# Patient Record
Sex: FEMALE | Race: WHITE | Employment: OTHER | URBAN - METROPOLITAN AREA
[De-identification: names, ages, dates, MRNs, and addresses within clinical notes are randomized per-mention and may not be internally consistent; named-entity substitution may affect disease eponyms.]

---

## 2022-02-19 ENCOUNTER — APPOINTMENT (OUTPATIENT)
Dept: CT IMAGING | Age: 82
DRG: 270 | End: 2022-02-19
Payer: MEDICARE

## 2022-02-19 ENCOUNTER — ANESTHESIA (OUTPATIENT)
Dept: OPERATING ROOM | Age: 82
DRG: 270 | End: 2022-02-19
Payer: MEDICARE

## 2022-02-19 ENCOUNTER — ANESTHESIA EVENT (OUTPATIENT)
Dept: OPERATING ROOM | Age: 82
DRG: 270 | End: 2022-02-19
Payer: MEDICARE

## 2022-02-19 ENCOUNTER — APPOINTMENT (OUTPATIENT)
Dept: GENERAL RADIOLOGY | Age: 82
DRG: 270 | End: 2022-02-19
Payer: MEDICARE

## 2022-02-19 ENCOUNTER — HOSPITAL ENCOUNTER (INPATIENT)
Age: 82
LOS: 9 days | Discharge: NURSING, REHAB OR LONG-TERM CARE | DRG: 270 | End: 2022-02-28
Attending: EMERGENCY MEDICINE | Admitting: INTERNAL MEDICINE
Payer: MEDICARE

## 2022-02-19 VITALS
DIASTOLIC BLOOD PRESSURE: 81 MMHG | TEMPERATURE: 98.4 F | OXYGEN SATURATION: 97 % | SYSTOLIC BLOOD PRESSURE: 178 MMHG | RESPIRATION RATE: 19 BRPM

## 2022-02-19 DIAGNOSIS — I74.2: ICD-10-CM

## 2022-02-19 DIAGNOSIS — I26.02 ACUTE SADDLE PULMONARY EMBOLISM WITH ACUTE COR PULMONALE (HCC): Primary | ICD-10-CM

## 2022-02-19 DIAGNOSIS — K55.069 SUPERIOR MESENTERIC ARTERY THROMBOSIS (HCC): ICD-10-CM

## 2022-02-19 DIAGNOSIS — I99.8 ISCHEMIA OF LEFT UPPER EXTREMITY: ICD-10-CM

## 2022-02-19 PROBLEM — I26.99 MULTIPLE PULMONARY EMBOLI (HCC): Status: ACTIVE | Noted: 2022-02-19

## 2022-02-19 PROBLEM — I70.8 OCCLUSION OF LEFT SUBCLAVIAN ARTERY: Status: ACTIVE | Noted: 2022-02-19

## 2022-02-19 LAB
AADO2: 435 MMHG
ABO/RH: NORMAL
ALBUMIN SERPL-MCNC: 3.3 G/DL (ref 3.5–5.2)
ALBUMIN SERPL-MCNC: 3.5 G/DL (ref 3.5–5.2)
ALP BLD-CCNC: 67 U/L (ref 35–104)
ALP BLD-CCNC: 74 U/L (ref 35–104)
ALT SERPL-CCNC: 38 U/L (ref 0–32)
ALT SERPL-CCNC: 38 U/L (ref 0–32)
ANION GAP SERPL CALCULATED.3IONS-SCNC: 12 MMOL/L (ref 7–16)
ANION GAP SERPL CALCULATED.3IONS-SCNC: 14 MMOL/L (ref 7–16)
ANION GAP: 7 MMOL/L (ref 7–16)
ANION GAP: 9 MMOL/L (ref 7–16)
ANTIBODY SCREEN: NORMAL
APTT: 26.9 SEC (ref 24.5–35.1)
APTT: >240 SEC (ref 24.5–35.1)
APTT: >240 SEC (ref 24.5–35.1)
AST SERPL-CCNC: 25 U/L (ref 0–31)
AST SERPL-CCNC: 26 U/L (ref 0–31)
B.E.: -1 MMOL/L (ref -3–3)
B.E.: -5.5 MMOL/L (ref -3–3)
B.E.: 0.3 MMOL/L (ref -3–3)
BASOPHILS ABSOLUTE: 0.05 E9/L (ref 0–0.2)
BASOPHILS ABSOLUTE: 0.05 E9/L (ref 0–0.2)
BASOPHILS RELATIVE PERCENT: 0.3 % (ref 0–2)
BASOPHILS RELATIVE PERCENT: 0.6 % (ref 0–2)
BILIRUB SERPL-MCNC: 0.3 MG/DL (ref 0–1.2)
BILIRUB SERPL-MCNC: 0.4 MG/DL (ref 0–1.2)
BILIRUBIN URINE: NEGATIVE
BLOOD, URINE: NEGATIVE
BUN BLDV-MCNC: 13 MG/DL (ref 6–23)
BUN BLDV-MCNC: 16 MG/DL (ref 6–23)
CALCIUM IONIZED: 1.2 MMOL/L (ref 1.15–1.33)
CALCIUM SERPL-MCNC: 8.1 MG/DL (ref 8.6–10.2)
CALCIUM SERPL-MCNC: 9.2 MG/DL (ref 8.6–10.2)
CARDIOPULMONARY BYPASS: NO
CARDIOPULMONARY BYPASS: NO
CHLORIDE BLD-SCNC: 101 MMOL/L (ref 98–107)
CHLORIDE BLD-SCNC: 102 MMOL/L (ref 98–107)
CHP ED QC CHECK: NORMAL
CLARITY: CLEAR
CO2: 20 MMOL/L (ref 22–29)
CO2: 23 MMOL/L (ref 22–29)
COHB: 0.6 % (ref 0–1.5)
COLOR: YELLOW
CREAT SERPL-MCNC: 0.7 MG/DL (ref 0.5–1)
CREAT SERPL-MCNC: 0.7 MG/DL (ref 0.5–1)
CRITICAL: ABNORMAL
DATE ANALYZED: ABNORMAL
DATE OF COLLECTION: ABNORMAL
DEVICE: ABNORMAL
DEVICE: ABNORMAL
EKG ATRIAL RATE: 78 BPM
EKG P AXIS: 31 DEGREES
EKG P-R INTERVAL: 146 MS
EKG Q-T INTERVAL: 474 MS
EKG QRS DURATION: 80 MS
EKG QTC CALCULATION (BAZETT): 540 MS
EKG R AXIS: 6 DEGREES
EKG T AXIS: -65 DEGREES
EKG VENTRICULAR RATE: 78 BPM
EOSINOPHILS ABSOLUTE: 0.07 E9/L (ref 0.05–0.5)
EOSINOPHILS ABSOLUTE: 0.14 E9/L (ref 0.05–0.5)
EOSINOPHILS RELATIVE PERCENT: 0.4 % (ref 0–6)
EOSINOPHILS RELATIVE PERCENT: 1.5 % (ref 0–6)
FIO2: 80 %
GFR AFRICAN AMERICAN: >60
GFR NON-AFRICAN AMERICAN: >60 ML/MIN/1.73
GFR NON-AFRICAN AMERICAN: >60 ML/MIN/1.73
GFR, ESTIMATED: >60 ML/MIN/1.73
GFR, ESTIMATED: >60 ML/MIN/1.73
GLUCOSE BLD-MCNC: 138 MG/DL (ref 74–99)
GLUCOSE BLD-MCNC: 166 MG/DL (ref 74–99)
GLUCOSE BLD-MCNC: 171 MG/DL
GLUCOSE BLD-MCNC: 215 MG/DL (ref 74–99)
GLUCOSE BLD-MCNC: 252 MG/DL (ref 74–99)
GLUCOSE URINE: NEGATIVE MG/DL
HCO3 ARTERIAL: 22.5 MMOL/L (ref 22–26)
HCO3 ARTERIAL: 28.2 MMOL/L (ref 22–26)
HCO3: 19 MMOL/L (ref 22–26)
HCT (EST): 36 % (ref 34–48)
HCT (EST): 43 % (ref 34–48)
HCT VFR BLD CALC: 39.9 % (ref 34–48)
HCT VFR BLD CALC: 44.2 % (ref 34–48)
HEMOGLOBIN: 12.6 G/DL (ref 11.5–15.5)
HEMOGLOBIN: 13.8 G/DL (ref 11.5–15.5)
HGB, (EST): 12.2 G/DL (ref 11.5–15.5)
HGB, (EST): 14.6 G/DL (ref 11.5–15.5)
HHB: 4.9 % (ref 0–5)
IMMATURE GRANULOCYTES #: 0.05 E9/L
IMMATURE GRANULOCYTES #: 0.08 E9/L
IMMATURE GRANULOCYTES %: 0.5 % (ref 0–5)
IMMATURE GRANULOCYTES %: 0.6 % (ref 0–5)
INR BLD: 1.1
KETONES, URINE: NEGATIVE MG/DL
LAB: ABNORMAL
LACTIC ACID: 3.2 MMOL/L (ref 0.5–2.2)
LEUKOCYTE ESTERASE, URINE: NEGATIVE
LYMPHOCYTES ABSOLUTE: 1.86 E9/L (ref 1.5–4)
LYMPHOCYTES ABSOLUTE: 2.54 E9/L (ref 1.5–4)
LYMPHOCYTES RELATIVE PERCENT: 16.3 % (ref 20–42)
LYMPHOCYTES RELATIVE PERCENT: 20.6 % (ref 20–42)
Lab: ABNORMAL
MAGNESIUM: 1.9 MG/DL (ref 1.6–2.6)
MCH RBC QN AUTO: 24.2 PG (ref 26–35)
MCH RBC QN AUTO: 24.5 PG (ref 26–35)
MCHC RBC AUTO-ENTMCNC: 31.2 % (ref 32–34.5)
MCHC RBC AUTO-ENTMCNC: 31.6 % (ref 32–34.5)
MCV RBC AUTO: 77.4 FL (ref 80–99.9)
MCV RBC AUTO: 77.5 FL (ref 80–99.9)
METER GLUCOSE: 171 MG/DL (ref 74–99)
METHB: 0.3 % (ref 0–1.5)
MODE: AC
MONOCYTES ABSOLUTE: 0.48 E9/L (ref 0.1–0.95)
MONOCYTES ABSOLUTE: 0.93 E9/L (ref 0.1–0.95)
MONOCYTES RELATIVE PERCENT: 5.3 % (ref 2–12)
MONOCYTES RELATIVE PERCENT: 6 % (ref 2–12)
NEUTROPHILS ABSOLUTE: 11.96 E9/L (ref 1.8–7.3)
NEUTROPHILS ABSOLUTE: 6.47 E9/L (ref 1.8–7.3)
NEUTROPHILS RELATIVE PERCENT: 71.4 % (ref 43–80)
NEUTROPHILS RELATIVE PERCENT: 76.5 % (ref 43–80)
NITRITE, URINE: NEGATIVE
O2 SATURATION: 95.1 % (ref 92–98.5)
O2 SATURATION: 95.3 % (ref 92–98.5)
O2 SATURATION: 99.3 % (ref 92–98.5)
O2HB: 94.2 % (ref 94–97)
OPERATOR ID: 1632
OPERATOR ID: ABNORMAL
OPERATOR ID: ABNORMAL
PATIENT TEMP: 37 C
PCO2 ARTERIAL: 32.9 MMHG (ref 35–45)
PCO2 ARTERIAL: 58.2 MMHG (ref 35–45)
PCO2: 34.3 MMHG (ref 35–45)
PDW BLD-RTO: 14.7 FL (ref 11.5–15)
PDW BLD-RTO: 15.8 FL (ref 11.5–15)
PEEP/CPAP: 8 CMH2O
PFO2: 0.99 MMHG/%
PH BLOOD GAS: 7.29 (ref 7.35–7.45)
PH BLOOD GAS: 7.36 (ref 7.35–7.45)
PH BLOOD GAS: 7.44 (ref 7.35–7.45)
PH UA: 7 (ref 5–9)
PHOSPHORUS: 3.8 MG/DL (ref 2.5–4.5)
PLATELET # BLD: 225 E9/L (ref 130–450)
PLATELET # BLD: 272 E9/L (ref 130–450)
PMV BLD AUTO: 9.3 FL (ref 7–12)
PMV BLD AUTO: 9.3 FL (ref 7–12)
PO2 ARTERIAL: 139.3 MMHG (ref 60–80)
PO2 ARTERIAL: 87.9 MMHG (ref 60–80)
PO2: 79.4 MMHG (ref 75–100)
POC BUN: 10 MG/DL (ref 8–23)
POC BUN: 11 MG/DL (ref 8–23)
POC CHLORIDE: 108 MMOL/L (ref 100–108)
POC CHLORIDE: 109 MMOL/L (ref 100–108)
POC CO2: 23.1 MMOL/L (ref 22–29)
POC CO2: 29.1 MMOL/L (ref 22–29)
POC CREATININE: 0.6 MG/DL (ref 0.5–1)
POC CREATININE: 0.7 MG/DL (ref 0.5–1)
POC IONIZED CALCIUM: 1.1 (ref 1.1–1.3)
POC IONIZED CALCIUM: 1.2 (ref 1.1–1.3)
POC LACTIC ACID: 1.6 (ref 0.5–2.2)
POC LACTIC ACID: 1.7 (ref 0.5–2.2)
POC SODIUM: 141 MMOL/L (ref 132–146)
POC SODIUM: 144 MMOL/L (ref 132–146)
POTASSIUM SERPL-SCNC: 3.6 MMOL/L (ref 3.5–5.5)
POTASSIUM SERPL-SCNC: 3.8 MMOL/L (ref 3.5–5)
POTASSIUM SERPL-SCNC: 3.8 MMOL/L (ref 3.5–5.5)
POTASSIUM SERPL-SCNC: 4 MMOL/L (ref 3.5–5)
PROTEIN UA: NEGATIVE MG/DL
PROTHROMBIN TIME: 12.1 SEC (ref 9.3–12.4)
RBC # BLD: 5.15 E12/L (ref 3.5–5.5)
RBC # BLD: 5.71 E12/L (ref 3.5–5.5)
REASON FOR REJECTION: NORMAL
REJECTED TEST: NORMAL
RI(T): 5.48
RR MECHANICAL: 16 B/MIN
SARS-COV-2, NAAT: NOT DETECTED
SODIUM BLD-SCNC: 136 MMOL/L (ref 132–146)
SODIUM BLD-SCNC: 136 MMOL/L (ref 132–146)
SOURCE, BLOOD GAS: ABNORMAL
SPECIFIC GRAVITY UA: 1.01 (ref 1–1.03)
THB: 13.7 G/DL (ref 11.5–16.5)
TIME ANALYZED: 2316
TOTAL PROTEIN: 6 G/DL (ref 6.4–8.3)
TOTAL PROTEIN: 6.6 G/DL (ref 6.4–8.3)
TROPONIN, HIGH SENSITIVITY: 55 NG/L (ref 0–9)
UROBILINOGEN, URINE: 0.2 E.U./DL
VT MECHANICAL: 350 ML
WBC # BLD: 15.6 E9/L (ref 4.5–11.5)
WBC # BLD: 9.1 E9/L (ref 4.5–11.5)

## 2022-02-19 PROCEDURE — 94002 VENT MGMT INPAT INIT DAY: CPT

## 2022-02-19 PROCEDURE — 06H03DZ INSERTION OF INTRALUMINAL DEVICE INTO INFERIOR VENA CAVA, PERCUTANEOUS APPROACH: ICD-10-PCS | Performed by: SURGERY

## 2022-02-19 PROCEDURE — 81003 URINALYSIS AUTO W/O SCOPE: CPT

## 2022-02-19 PROCEDURE — 6360000002 HC RX W HCPCS: Performed by: NURSE ANESTHETIST, CERTIFIED REGISTERED

## 2022-02-19 PROCEDURE — 04C53ZZ EXTIRPATION OF MATTER FROM SUPERIOR MESENTERIC ARTERY, PERCUTANEOUS APPROACH: ICD-10-PCS | Performed by: SURGERY

## 2022-02-19 PROCEDURE — 70450 CT HEAD/BRAIN W/O DYE: CPT

## 2022-02-19 PROCEDURE — 70496 CT ANGIOGRAPHY HEAD: CPT | Performed by: RADIOLOGY

## 2022-02-19 PROCEDURE — 71045 X-RAY EXAM CHEST 1 VIEW: CPT

## 2022-02-19 PROCEDURE — 70496 CT ANGIOGRAPHY HEAD: CPT

## 2022-02-19 PROCEDURE — 96365 THER/PROPH/DIAG IV INF INIT: CPT

## 2022-02-19 PROCEDURE — 34151 REMOVAL OF ARTERY CLOT: CPT | Performed by: SURGERY

## 2022-02-19 PROCEDURE — 3600000017 HC SURGERY HYBRID ADDL 15MIN: Performed by: SURGERY

## 2022-02-19 PROCEDURE — 3600000007 HC SURGERY HYBRID BASE: Performed by: SURGERY

## 2022-02-19 PROCEDURE — 85347 COAGULATION TIME ACTIVATED: CPT

## 2022-02-19 PROCEDURE — C1757 CATH, THROMBECTOMY/EMBOLECT: HCPCS | Performed by: SURGERY

## 2022-02-19 PROCEDURE — 82330 ASSAY OF CALCIUM: CPT

## 2022-02-19 PROCEDURE — 0042T CT BRAIN PERFUSION: CPT | Performed by: RADIOLOGY

## 2022-02-19 PROCEDURE — 85730 THROMBOPLASTIN TIME PARTIAL: CPT

## 2022-02-19 PROCEDURE — 82962 GLUCOSE BLOOD TEST: CPT

## 2022-02-19 PROCEDURE — 2700000000 HC OXYGEN THERAPY PER DAY

## 2022-02-19 PROCEDURE — 6360000004 HC RX CONTRAST MEDICATION: Performed by: RADIOLOGY

## 2022-02-19 PROCEDURE — 84484 ASSAY OF TROPONIN QUANT: CPT

## 2022-02-19 PROCEDURE — 99285 EMERGENCY DEPT VISIT HI MDM: CPT

## 2022-02-19 PROCEDURE — A4217 STERILE WATER/SALINE, 500 ML: HCPCS | Performed by: SURGERY

## 2022-02-19 PROCEDURE — 96375 TX/PRO/DX INJ NEW DRUG ADDON: CPT

## 2022-02-19 PROCEDURE — 6360000002 HC RX W HCPCS: Performed by: SURGERY

## 2022-02-19 PROCEDURE — 2709999900 HC NON-CHARGEABLE SUPPLY: Performed by: SURGERY

## 2022-02-19 PROCEDURE — C1880 VENA CAVA FILTER: HCPCS | Performed by: SURGERY

## 2022-02-19 PROCEDURE — 74018 RADEX ABDOMEN 1 VIEW: CPT

## 2022-02-19 PROCEDURE — 99448 NTRPROF PH1/NTRNET/EHR 21-30: CPT | Performed by: PSYCHIATRY & NEUROLOGY

## 2022-02-19 PROCEDURE — 80053 COMPREHEN METABOLIC PANEL: CPT

## 2022-02-19 PROCEDURE — 86850 RBC ANTIBODY SCREEN: CPT

## 2022-02-19 PROCEDURE — 93005 ELECTROCARDIOGRAM TRACING: CPT | Performed by: EMERGENCY MEDICINE

## 2022-02-19 PROCEDURE — 82803 BLOOD GASES ANY COMBINATION: CPT

## 2022-02-19 PROCEDURE — 86901 BLOOD TYPING SEROLOGIC RH(D): CPT

## 2022-02-19 PROCEDURE — 3700000000 HC ANESTHESIA ATTENDED CARE: Performed by: SURGERY

## 2022-02-19 PROCEDURE — 2500000003 HC RX 250 WO HCPCS: Performed by: NURSE ANESTHETIST, CERTIFIED REGISTERED

## 2022-02-19 PROCEDURE — 83735 ASSAY OF MAGNESIUM: CPT

## 2022-02-19 PROCEDURE — 2580000003 HC RX 258: Performed by: SURGERY

## 2022-02-19 PROCEDURE — 70498 CT ANGIOGRAPHY NECK: CPT | Performed by: RADIOLOGY

## 2022-02-19 PROCEDURE — 99291 CRITICAL CARE FIRST HOUR: CPT | Performed by: SURGERY

## 2022-02-19 PROCEDURE — 6360000002 HC RX W HCPCS: Performed by: EMERGENCY MEDICINE

## 2022-02-19 PROCEDURE — 84100 ASSAY OF PHOSPHORUS: CPT

## 2022-02-19 PROCEDURE — 36592 COLLECT BLOOD FROM PICC: CPT

## 2022-02-19 PROCEDURE — 2580000003 HC RX 258: Performed by: RADIOLOGY

## 2022-02-19 PROCEDURE — 86923 COMPATIBILITY TEST ELECTRIC: CPT

## 2022-02-19 PROCEDURE — 85610 PROTHROMBIN TIME: CPT

## 2022-02-19 PROCEDURE — 93010 ELECTROCARDIOGRAM REPORT: CPT | Performed by: INTERNAL MEDICINE

## 2022-02-19 PROCEDURE — 96366 THER/PROPH/DIAG IV INF ADDON: CPT

## 2022-02-19 PROCEDURE — 0042T CT BRAIN PERFUSION: CPT

## 2022-02-19 PROCEDURE — 37191 INS ENDOVAS VENA CAVA FILTR: CPT | Performed by: SURGERY

## 2022-02-19 PROCEDURE — 2580000003 HC RX 258: Performed by: STUDENT IN AN ORGANIZED HEALTH CARE EDUCATION/TRAINING PROGRAM

## 2022-02-19 PROCEDURE — 83605 ASSAY OF LACTIC ACID: CPT

## 2022-02-19 PROCEDURE — 85025 COMPLETE CBC W/AUTO DIFF WBC: CPT

## 2022-02-19 PROCEDURE — 71275 CT ANGIOGRAPHY CHEST: CPT

## 2022-02-19 PROCEDURE — 2000000000 HC ICU R&B

## 2022-02-19 PROCEDURE — 70498 CT ANGIOGRAPHY NECK: CPT

## 2022-02-19 PROCEDURE — C1769 GUIDE WIRE: HCPCS | Performed by: SURGERY

## 2022-02-19 PROCEDURE — 3700000001 HC ADD 15 MINUTES (ANESTHESIA): Performed by: SURGERY

## 2022-02-19 PROCEDURE — 02HV33Z INSERTION OF INFUSION DEVICE INTO SUPERIOR VENA CAVA, PERCUTANEOUS APPROACH: ICD-10-PCS | Performed by: ANESTHESIOLOGY

## 2022-02-19 PROCEDURE — 2500000003 HC RX 250 WO HCPCS: Performed by: STUDENT IN AN ORGANIZED HEALTH CARE EDUCATION/TRAINING PROGRAM

## 2022-02-19 PROCEDURE — 36415 COLL VENOUS BLD VENIPUNCTURE: CPT

## 2022-02-19 PROCEDURE — 2580000003 HC RX 258: Performed by: NURSE ANESTHETIST, CERTIFIED REGISTERED

## 2022-02-19 PROCEDURE — 87635 SARS-COV-2 COVID-19 AMP PRB: CPT

## 2022-02-19 PROCEDURE — 82805 BLOOD GASES W/O2 SATURATION: CPT

## 2022-02-19 PROCEDURE — 6370000000 HC RX 637 (ALT 250 FOR IP): Performed by: SURGERY

## 2022-02-19 PROCEDURE — 86900 BLOOD TYPING SEROLOGIC ABO: CPT

## 2022-02-19 PROCEDURE — 73206 CT ANGIO UPR EXTRM W/O&W/DYE: CPT

## 2022-02-19 PROCEDURE — 6360000002 HC RX W HCPCS: Performed by: STUDENT IN AN ORGANIZED HEALTH CARE EDUCATION/TRAINING PROGRAM

## 2022-02-19 PROCEDURE — 88304 TISSUE EXAM BY PATHOLOGIST: CPT

## 2022-02-19 PROCEDURE — 2500000003 HC RX 250 WO HCPCS: Performed by: SURGERY

## 2022-02-19 PROCEDURE — 34111 REMOVAL OF ARM ARTERY CLOT: CPT | Performed by: SURGERY

## 2022-02-19 PROCEDURE — 4A03X5D MEASUREMENT OF ARTERIAL FLOW, INTRACRANIAL, EXTERNAL APPROACH: ICD-10-PCS | Performed by: EMERGENCY MEDICINE

## 2022-02-19 DEVICE — FILTER VASC L50MM DIA30MM INTRO 7FR L65CM VENA CAVA FEM W: Type: IMPLANTABLE DEVICE | Site: VENA CAVA | Status: FUNCTIONAL

## 2022-02-19 RX ORDER — HEPARIN SODIUM 1000 [USP'U]/ML
80 INJECTION, SOLUTION INTRAVENOUS; SUBCUTANEOUS PRN
Status: DISCONTINUED | OUTPATIENT
Start: 2022-02-19 | End: 2022-02-24 | Stop reason: ALTCHOICE

## 2022-02-19 RX ORDER — SODIUM CHLORIDE 0.9 % (FLUSH) 0.9 %
5-40 SYRINGE (ML) INJECTION EVERY 12 HOURS SCHEDULED
Status: DISCONTINUED | OUTPATIENT
Start: 2022-02-19 | End: 2022-02-28 | Stop reason: HOSPADM

## 2022-02-19 RX ORDER — SUCCINYLCHOLINE/SOD CL,ISO/PF 200MG/10ML
SYRINGE (ML) INTRAVENOUS PRN
Status: DISCONTINUED | OUTPATIENT
Start: 2022-02-19 | End: 2022-02-19 | Stop reason: SDUPTHER

## 2022-02-19 RX ORDER — HEPARIN SODIUM 1000 [USP'U]/ML
80 INJECTION, SOLUTION INTRAVENOUS; SUBCUTANEOUS ONCE
Status: COMPLETED | OUTPATIENT
Start: 2022-02-19 | End: 2022-02-19

## 2022-02-19 RX ORDER — FENTANYL CITRATE 50 UG/ML
INJECTION, SOLUTION INTRAMUSCULAR; INTRAVENOUS PRN
Status: DISCONTINUED | OUTPATIENT
Start: 2022-02-19 | End: 2022-02-19 | Stop reason: SDUPTHER

## 2022-02-19 RX ORDER — PROPOFOL 10 MG/ML
INJECTION, EMULSION INTRAVENOUS CONTINUOUS PRN
Status: DISCONTINUED | OUTPATIENT
Start: 2022-02-19 | End: 2022-02-19 | Stop reason: SDUPTHER

## 2022-02-19 RX ORDER — SODIUM CHLORIDE 0.9 % (FLUSH) 0.9 %
5-40 SYRINGE (ML) INJECTION PRN
Status: DISCONTINUED | OUTPATIENT
Start: 2022-02-19 | End: 2022-02-28 | Stop reason: HOSPADM

## 2022-02-19 RX ORDER — HEPARIN SODIUM 10000 [USP'U]/100ML
18 INJECTION, SOLUTION INTRAVENOUS CONTINUOUS
Status: DISCONTINUED | OUTPATIENT
Start: 2022-02-19 | End: 2022-02-24

## 2022-02-19 RX ORDER — MILRINONE LACTATE 0.2 MG/ML
.0625-.75 INJECTION, SOLUTION INTRAVENOUS CONTINUOUS
Status: DISCONTINUED | OUTPATIENT
Start: 2022-02-19 | End: 2022-02-20

## 2022-02-19 RX ORDER — VECURONIUM BROMIDE 1 MG/ML
INJECTION, POWDER, LYOPHILIZED, FOR SOLUTION INTRAVENOUS PRN
Status: DISCONTINUED | OUTPATIENT
Start: 2022-02-19 | End: 2022-02-19 | Stop reason: SDUPTHER

## 2022-02-19 RX ORDER — ACETAMINOPHEN 160 MG/5ML
650 SOLUTION ORAL EVERY 4 HOURS PRN
Status: DISCONTINUED | OUTPATIENT
Start: 2022-02-19 | End: 2022-02-21

## 2022-02-19 RX ORDER — SODIUM CHLORIDE 9 MG/ML
INJECTION, SOLUTION INTRAVENOUS CONTINUOUS PRN
Status: DISCONTINUED | OUTPATIENT
Start: 2022-02-19 | End: 2022-02-19 | Stop reason: SDUPTHER

## 2022-02-19 RX ORDER — HEPARIN SODIUM 1000 [USP'U]/ML
40 INJECTION, SOLUTION INTRAVENOUS; SUBCUTANEOUS PRN
Status: DISCONTINUED | OUTPATIENT
Start: 2022-02-19 | End: 2022-02-24 | Stop reason: ALTCHOICE

## 2022-02-19 RX ORDER — MILRINONE LACTATE 0.2 MG/ML
INJECTION, SOLUTION INTRAVENOUS CONTINUOUS PRN
Status: DISCONTINUED | OUTPATIENT
Start: 2022-02-19 | End: 2022-02-19 | Stop reason: SDUPTHER

## 2022-02-19 RX ORDER — SODIUM CHLORIDE 9 MG/ML
INJECTION, SOLUTION INTRAVENOUS PRN
Status: DISCONTINUED | OUTPATIENT
Start: 2022-02-19 | End: 2022-02-21

## 2022-02-19 RX ORDER — SODIUM CHLORIDE, SODIUM LACTATE, POTASSIUM CHLORIDE, CALCIUM CHLORIDE 600; 310; 30; 20 MG/100ML; MG/100ML; MG/100ML; MG/100ML
INJECTION, SOLUTION INTRAVENOUS CONTINUOUS
Status: DISCONTINUED | OUTPATIENT
Start: 2022-02-19 | End: 2022-02-21

## 2022-02-19 RX ORDER — SODIUM CHLORIDE 0.9 % (FLUSH) 0.9 %
10 SYRINGE (ML) INJECTION ONCE
Status: COMPLETED | OUTPATIENT
Start: 2022-02-19 | End: 2022-02-19

## 2022-02-19 RX ORDER — PROPOFOL 10 MG/ML
5-50 INJECTION, EMULSION INTRAVENOUS
Status: DISCONTINUED | OUTPATIENT
Start: 2022-02-19 | End: 2022-02-21

## 2022-02-19 RX ORDER — MINERAL OIL AND WHITE PETROLATUM 150; 830 MG/G; MG/G
OINTMENT OPHTHALMIC PRN
Status: DISCONTINUED | OUTPATIENT
Start: 2022-02-20 | End: 2022-02-21

## 2022-02-19 RX ORDER — CHLORHEXIDINE GLUCONATE 0.12 MG/ML
15 RINSE ORAL 2 TIMES DAILY
Status: DISCONTINUED | OUTPATIENT
Start: 2022-02-19 | End: 2022-02-21

## 2022-02-19 RX ORDER — ETOMIDATE 2 MG/ML
INJECTION INTRAVENOUS PRN
Status: DISCONTINUED | OUTPATIENT
Start: 2022-02-19 | End: 2022-02-19 | Stop reason: SDUPTHER

## 2022-02-19 RX ORDER — POLYVINYL ALCOHOL 14 MG/ML
1 SOLUTION/ DROPS OPHTHALMIC PRN
Status: DISCONTINUED | OUTPATIENT
Start: 2022-02-19 | End: 2022-02-21

## 2022-02-19 RX ORDER — SODIUM CHLORIDE 9 MG/ML
25 INJECTION, SOLUTION INTRAVENOUS PRN
Status: DISCONTINUED | OUTPATIENT
Start: 2022-02-19 | End: 2022-02-28 | Stop reason: HOSPADM

## 2022-02-19 RX ORDER — MIDAZOLAM HYDROCHLORIDE 1 MG/ML
INJECTION INTRAMUSCULAR; INTRAVENOUS PRN
Status: DISCONTINUED | OUTPATIENT
Start: 2022-02-19 | End: 2022-02-19 | Stop reason: SDUPTHER

## 2022-02-19 RX ORDER — LIDOCAINE HYDROCHLORIDE 20 MG/ML
INJECTION, SOLUTION INTRAVENOUS PRN
Status: DISCONTINUED | OUTPATIENT
Start: 2022-02-19 | End: 2022-02-19 | Stop reason: SDUPTHER

## 2022-02-19 RX ORDER — HEPARIN SODIUM 1000 [USP'U]/ML
INJECTION, SOLUTION INTRAVENOUS; SUBCUTANEOUS PRN
Status: DISCONTINUED | OUTPATIENT
Start: 2022-02-19 | End: 2022-02-19 | Stop reason: SDUPTHER

## 2022-02-19 RX ORDER — SODIUM CHLORIDE, SODIUM LACTATE, POTASSIUM CHLORIDE, CALCIUM CHLORIDE 600; 310; 30; 20 MG/100ML; MG/100ML; MG/100ML; MG/100ML
INJECTION, SOLUTION INTRAVENOUS CONTINUOUS PRN
Status: DISCONTINUED | OUTPATIENT
Start: 2022-02-19 | End: 2022-02-19 | Stop reason: SDUPTHER

## 2022-02-19 RX ADMIN — IOPAMIDOL 90 ML: 755 INJECTION, SOLUTION INTRAVENOUS at 13:27

## 2022-02-19 RX ADMIN — SODIUM CHLORIDE, POTASSIUM CHLORIDE, SODIUM LACTATE AND CALCIUM CHLORIDE: 600; 310; 30; 20 INJECTION, SOLUTION INTRAVENOUS at 18:30

## 2022-02-19 RX ADMIN — MILRINONE LACTATE 0.2 MCG/KG/MIN: 0.2 INJECTION, SOLUTION INTRAVENOUS at 19:00

## 2022-02-19 RX ADMIN — IOPAMIDOL 105 ML: 755 INJECTION, SOLUTION INTRAVENOUS at 12:59

## 2022-02-19 RX ADMIN — VECURONIUM BROMIDE 10 MG: 10 INJECTION, POWDER, LYOPHILIZED, FOR SOLUTION INTRAVENOUS at 20:36

## 2022-02-19 RX ADMIN — VECURONIUM BROMIDE 2 MG: 10 INJECTION, POWDER, LYOPHILIZED, FOR SOLUTION INTRAVENOUS at 19:47

## 2022-02-19 RX ADMIN — MIDAZOLAM 0.5 MG: 1 INJECTION INTRAMUSCULAR; INTRAVENOUS at 18:00

## 2022-02-19 RX ADMIN — HEPARIN SODIUM 18 UNITS/KG/HR: 10000 INJECTION, SOLUTION INTRAVENOUS at 13:44

## 2022-02-19 RX ADMIN — FENTANYL CITRATE 50 MCG: 50 INJECTION, SOLUTION INTRAMUSCULAR; INTRAVENOUS at 22:12

## 2022-02-19 RX ADMIN — FENTANYL CITRATE 50 MCG: 50 INJECTION, SOLUTION INTRAMUSCULAR; INTRAVENOUS at 18:28

## 2022-02-19 RX ADMIN — HEPARIN SODIUM 6950 UNITS: 1000 INJECTION INTRAVENOUS; SUBCUTANEOUS at 13:43

## 2022-02-19 RX ADMIN — ETOMIDATE 12 MG: 2 INJECTION, SOLUTION INTRAVENOUS at 18:20

## 2022-02-19 RX ADMIN — Medication 10 ML: at 13:27

## 2022-02-19 RX ADMIN — FENTANYL CITRATE 50 MCG: 50 INJECTION, SOLUTION INTRAMUSCULAR; INTRAVENOUS at 21:53

## 2022-02-19 RX ADMIN — PROPOFOL 15 MCG/KG/MIN: 10 INJECTION, EMULSION INTRAVENOUS at 21:55

## 2022-02-19 RX ADMIN — MILRINONE LACTATE IN DEXTROSE 0.5 MCG/KG/MIN: 200 INJECTION, SOLUTION INTRAVENOUS at 22:30

## 2022-02-19 RX ADMIN — FENTANYL CITRATE 50 MCG: 50 INJECTION, SOLUTION INTRAMUSCULAR; INTRAVENOUS at 20:00

## 2022-02-19 RX ADMIN — PROPOFOL 20 MCG/KG/MIN: 10 INJECTION, EMULSION INTRAVENOUS at 22:30

## 2022-02-19 RX ADMIN — CEFAZOLIN 3000 MG: 1 INJECTION, POWDER, FOR SOLUTION INTRAMUSCULAR; INTRAVENOUS at 18:50

## 2022-02-19 RX ADMIN — MIDAZOLAM 0.5 MG: 1 INJECTION INTRAMUSCULAR; INTRAVENOUS at 18:05

## 2022-02-19 RX ADMIN — SODIUM CHLORIDE, POTASSIUM CHLORIDE, SODIUM LACTATE AND CALCIUM CHLORIDE: 600; 310; 30; 20 INJECTION, SOLUTION INTRAVENOUS at 23:53

## 2022-02-19 RX ADMIN — HEPARIN SODIUM 5000 UNITS: 1000 INJECTION INTRAVENOUS; SUBCUTANEOUS at 20:10

## 2022-02-19 RX ADMIN — Medication 10 ML: at 23:53

## 2022-02-19 RX ADMIN — FENTANYL CITRATE 50 MCG: 50 INJECTION, SOLUTION INTRAMUSCULAR; INTRAVENOUS at 19:05

## 2022-02-19 RX ADMIN — Medication 25 MCG/HR: at 23:36

## 2022-02-19 RX ADMIN — Medication 10 ML: at 13:20

## 2022-02-19 RX ADMIN — VECURONIUM BROMIDE 10 MG: 10 INJECTION, POWDER, LYOPHILIZED, FOR SOLUTION INTRAVENOUS at 18:35

## 2022-02-19 RX ADMIN — LIDOCAINE HYDROCHLORIDE 100 MG: 20 INJECTION, SOLUTION INTRAVENOUS at 18:20

## 2022-02-19 RX ADMIN — SODIUM CHLORIDE: 9 INJECTION, SOLUTION INTRAVENOUS at 17:52

## 2022-02-19 RX ADMIN — FENTANYL CITRATE 100 MCG: 50 INJECTION, SOLUTION INTRAMUSCULAR; INTRAVENOUS at 18:20

## 2022-02-19 RX ADMIN — Medication 140 MG: at 18:20

## 2022-02-19 ASSESSMENT — PULMONARY FUNCTION TESTS
PIF_VALUE: 0
PIF_VALUE: 27
PIF_VALUE: 25
PIF_VALUE: 27
PIF_VALUE: 28
PIF_VALUE: 23
PIF_VALUE: 27
PIF_VALUE: 0
PIF_VALUE: 25
PIF_VALUE: 26
PIF_VALUE: 28
PIF_VALUE: 24
PIF_VALUE: 27
PIF_VALUE: 26
PIF_VALUE: 27
PIF_VALUE: 25
PIF_VALUE: 20
PIF_VALUE: 23
PIF_VALUE: 27
PIF_VALUE: 26
PIF_VALUE: 24
PIF_VALUE: 27
PIF_VALUE: 19
PIF_VALUE: 25
PIF_VALUE: 27
PIF_VALUE: 27
PIF_VALUE: 0
PIF_VALUE: 23
PIF_VALUE: 23
PIF_VALUE: 27
PIF_VALUE: 0
PIF_VALUE: 23
PIF_VALUE: 0
PIF_VALUE: 23
PIF_VALUE: 0
PIF_VALUE: 26
PIF_VALUE: 27
PIF_VALUE: 28
PIF_VALUE: 18
PIF_VALUE: 25
PIF_VALUE: 12
PIF_VALUE: 0
PIF_VALUE: 24
PIF_VALUE: 27
PIF_VALUE: 26
PIF_VALUE: 23
PIF_VALUE: 24
PIF_VALUE: 27
PIF_VALUE: 19
PIF_VALUE: 28
PIF_VALUE: 27
PIF_VALUE: 17
PIF_VALUE: 27
PIF_VALUE: 28
PIF_VALUE: 27
PIF_VALUE: 0
PIF_VALUE: 24
PIF_VALUE: 1
PIF_VALUE: 25
PIF_VALUE: 24
PIF_VALUE: 1
PIF_VALUE: 25
PIF_VALUE: 20
PIF_VALUE: 25
PIF_VALUE: 27
PIF_VALUE: 21
PIF_VALUE: 3
PIF_VALUE: 23
PIF_VALUE: 23
PIF_VALUE: 27
PIF_VALUE: 27
PIF_VALUE: 23
PIF_VALUE: 0
PIF_VALUE: 27
PIF_VALUE: 27
PIF_VALUE: 28
PIF_VALUE: 27
PIF_VALUE: 26
PIF_VALUE: 25
PIF_VALUE: 26
PIF_VALUE: 27
PIF_VALUE: 20
PIF_VALUE: 0
PIF_VALUE: 27
PIF_VALUE: 26
PIF_VALUE: 27
PIF_VALUE: 27
PIF_VALUE: 24
PIF_VALUE: 25
PIF_VALUE: 1
PIF_VALUE: 28
PIF_VALUE: 20
PIF_VALUE: 27
PIF_VALUE: 25
PIF_VALUE: 26
PIF_VALUE: 28
PIF_VALUE: 23
PIF_VALUE: 23
PIF_VALUE: 27
PIF_VALUE: 19
PIF_VALUE: 20
PIF_VALUE: 24
PIF_VALUE: 27
PIF_VALUE: 23
PIF_VALUE: 22
PIF_VALUE: 17
PIF_VALUE: 27
PIF_VALUE: 19
PIF_VALUE: 28
PIF_VALUE: 26
PIF_VALUE: 26
PIF_VALUE: 27
PIF_VALUE: 0
PIF_VALUE: 27
PIF_VALUE: 24
PIF_VALUE: 26
PIF_VALUE: 28
PIF_VALUE: 27
PIF_VALUE: 1
PIF_VALUE: 20
PIF_VALUE: 26
PIF_VALUE: 27
PIF_VALUE: 24
PIF_VALUE: 26
PIF_VALUE: 27
PIF_VALUE: 24
PIF_VALUE: 23
PIF_VALUE: 28
PIF_VALUE: 24
PIF_VALUE: 21
PIF_VALUE: 26
PIF_VALUE: 26
PIF_VALUE: 22
PIF_VALUE: 19
PIF_VALUE: 0
PIF_VALUE: 0
PIF_VALUE: 27
PIF_VALUE: 23
PIF_VALUE: 0
PIF_VALUE: 23
PIF_VALUE: 24
PIF_VALUE: 27
PIF_VALUE: 24
PIF_VALUE: 24
PIF_VALUE: 26
PIF_VALUE: 0
PIF_VALUE: 27
PIF_VALUE: 23
PIF_VALUE: 17
PIF_VALUE: 23
PIF_VALUE: 23
PIF_VALUE: 21
PIF_VALUE: 19
PIF_VALUE: 24
PIF_VALUE: 23
PIF_VALUE: 27
PIF_VALUE: 27
PIF_VALUE: 25
PIF_VALUE: 19
PIF_VALUE: 19
PIF_VALUE: 27
PIF_VALUE: 20
PIF_VALUE: 26
PIF_VALUE: 25
PIF_VALUE: 26
PIF_VALUE: 24
PIF_VALUE: 27
PIF_VALUE: 27
PIF_VALUE: 24
PIF_VALUE: 27
PIF_VALUE: 24
PIF_VALUE: 27
PIF_VALUE: 27
PIF_VALUE: 24
PIF_VALUE: 0
PIF_VALUE: 0
PIF_VALUE: 28
PIF_VALUE: 23
PIF_VALUE: 24
PIF_VALUE: 27
PIF_VALUE: 24
PIF_VALUE: 27
PIF_VALUE: 24
PIF_VALUE: 29
PIF_VALUE: 27
PIF_VALUE: 0
PIF_VALUE: 26
PIF_VALUE: 24
PIF_VALUE: 26
PIF_VALUE: 25
PIF_VALUE: 19
PIF_VALUE: 27
PIF_VALUE: 26
PIF_VALUE: 24
PIF_VALUE: 0
PIF_VALUE: 24
PIF_VALUE: 23
PIF_VALUE: 19
PIF_VALUE: 27
PIF_VALUE: 25
PIF_VALUE: 0
PIF_VALUE: 23
PIF_VALUE: 25
PIF_VALUE: 25
PIF_VALUE: 27
PIF_VALUE: 23
PIF_VALUE: 23
PIF_VALUE: 20
PIF_VALUE: 43
PIF_VALUE: 32
PIF_VALUE: 23
PIF_VALUE: 27
PIF_VALUE: 27
PIF_VALUE: 17
PIF_VALUE: 19
PIF_VALUE: 27
PIF_VALUE: 26
PIF_VALUE: 23
PIF_VALUE: 24
PIF_VALUE: 24
PIF_VALUE: 23
PIF_VALUE: 24
PIF_VALUE: 21
PIF_VALUE: 25
PIF_VALUE: 24
PIF_VALUE: 0
PIF_VALUE: 27
PIF_VALUE: 25
PIF_VALUE: 25
PIF_VALUE: 24
PIF_VALUE: 23
PIF_VALUE: 27
PIF_VALUE: 27
PIF_VALUE: 24
PIF_VALUE: 24
PIF_VALUE: 0
PIF_VALUE: 27
PIF_VALUE: 27
PIF_VALUE: 24
PIF_VALUE: 18
PIF_VALUE: 2
PIF_VALUE: 0
PIF_VALUE: 19
PIF_VALUE: 25
PIF_VALUE: 22
PIF_VALUE: 25
PIF_VALUE: 25
PIF_VALUE: 20
PIF_VALUE: 27
PIF_VALUE: 29
PIF_VALUE: 0
PIF_VALUE: 24
PIF_VALUE: 24
PIF_VALUE: 27

## 2022-02-19 NOTE — CONSULTS
Vascular Surgery Consultation Note    Reason for Consult:  Pulseless L arm     HPI :    This is a 78 y.o. female with PMH HTN, DM, lymphedema, multiple prior DVT who was treated with short term anticoagulation, not currently taking, who presented to the ED after she had acute onset numbness, tingling and loss of strength in the left arm this morning. Patient's daughter brought her to the emergency room for further evaluation. It has since regained strength, but she is still having numbness and tingling. The arm is cool to the touch with no dopplerable pulses. Patient and family state that for the past month, patient has been increasingly sedentary due to persistent back pain. She has also had a recent ultrasound of her right leg at [facility] due to some pain and concern for DVT, which was negative. ROS : Negative if blank [], Positive if [x]  General Vascular   [] Fevers [] Claudication (Blocks)   [] Chills [] Rest Pain   [] Weight Loss [] Tissue Loss   [] Chest Pain [] Clotting Disorder    [] SOB at rest [] Leg Swelling   [] SOB with exertion [x] DVT/PE      [] Nausea    [] Vomiting [] Stroke/TIA   [] Abdominal Pain [] Focal weakness   [] Melena [] Slurred Speech   [] Hematochezia [] Vision Changes   [] Hematuria    [] Dysuria [] Hx of Central Catheters   [] Wears Glasses/Contacts  [] Dialysis and If so date initiated   [] Blindness     [x] Right Hand Dominant   [] Difficulty swallowing        Past Medical History:   Diagnosis Date    Anxiety     Arthritis     osteoarthritis    Depression     Diverticulitis     Dupuytren's contracture     Hyperlipidemia     Hypertension     Hypothyroidism     Lymphedema     Pre-diabetes         History reviewed. No pertinent surgical history. Current Medications:    heparin (PORCINE) Infusion 18 Units/kg/hr (02/19/22 1344)      heparin (porcine), heparin (porcine)        Allergies:  Patient has no known allergies.     Social History     Socioeconomic History    Marital status:      Spouse name: Not on file    Number of children: Not on file    Years of education: Not on file    Highest education level: Not on file   Occupational History    Not on file   Tobacco Use    Smoking status: Never Smoker    Smokeless tobacco: Never Used   Substance and Sexual Activity    Alcohol use: No     Alcohol/week: 0.0 standard drinks    Drug use: Not on file    Sexual activity: Not on file   Other Topics Concern    Not on file   Social History Narrative    Not on file     Social Determinants of Health     Financial Resource Strain:     Difficulty of Paying Living Expenses: Not on file   Food Insecurity:     Worried About Running Out of Food in the Last Year: Not on file    Irma of Food in the Last Year: Not on file   Transportation Needs:     Lack of Transportation (Medical): Not on file    Lack of Transportation (Non-Medical): Not on file   Physical Activity:     Days of Exercise per Week: Not on file    Minutes of Exercise per Session: Not on file   Stress:     Feeling of Stress : Not on file   Social Connections:     Frequency of Communication with Friends and Family: Not on file    Frequency of Social Gatherings with Friends and Family: Not on file    Attends Sabianist Services: Not on file    Active Member of [address] [place] [address] [employer] or Organizations: Not on file    Attends Club or Organization Meetings: Not on file    Marital Status: Not on file   Intimate Partner Violence:     Fear of Current or Ex-Partner: Not on file    Emotionally Abused: Not on file    Physically Abused: Not on file    Sexually Abused: Not on file   Housing Stability:     Unable to Pay for Housing in the Last Year: Not on file    Number of [place] in the Last Year: Not on file    Unstable Housing in the Last Year: Not on file        History reviewed. No pertinent family history.     PHYSICAL EXAM:    BP (!) 190/91   Pulse 71   Temp 97.5 °F (36.4 °C)   Resp 16   Ht 5' 1\" (1.549 m) Wt 191 lb 9.3 oz (86.9 kg)   SpO2 96%   BMI 36.20 kg/m²   CONSTITUTIONAL:  awake, alert, cooperative, no apparent distress, and appears stated age  Normal  EYES:  lids and lashes normal, sclera clear and conjunctiva normal  ENT:  normocepalic, without obvious abnormality, external ears without lesions  NECK:  supple, symmetrical, trachea midline,no jugular venous distension, no carotid bruits  HEMATOLOGIC/LYMPHATICS:  no cervical lymphadenopathy  LUNGS:  no increased work of breathing, good air exchange  CARDIOVASCULAR:  regular rate and rhythm no murmur noted  ABDOMEN:  soft, non-distended, non-tender, Aorta is not palpable  SKIN:  no bruising or bleeding  EXTREMITIES:   R UE Swelling absent Incisions absent       5/5 Strength  L UE Swelling absent Incisions absent       5/5 Strength  R LE Edema present  Incisions absent    Varicose veins absent    Wounds absent, normalcaprefill   5/5 Strength, neuropathy is absent  L LE Edema present  Incisions absent    Varicose veins absent    Wounds absent, normalcaprefill   5/5 Strength, neuropathy is absent  R brachial 2+ L brachial Not dopplerable   R radial 2+ L radial Not dopplerable    R femoral 2+ L femoral Not dopplerable   R popliteal Triphasic L popliteal Palp   R posterior tibial Triphasic L posterior tibial triphasic   R dorsalis pedis Not dopplerable L dorsalis pedis Palp     LABS:    Lab Results   Component Value Date    WBC 9.1 02/19/2022    HGB 13.8 02/19/2022    HCT 44.2 02/19/2022     02/19/2022    PROTIME 12.1 02/19/2022    INR 1.1 02/19/2022    K 3.8 02/19/2022    BUN 16 02/19/2022    CREATININE 0.7 02/19/2022       RADIOLOGY:    Assesment/Plan  81F who presents with complete occlusion left subclavian artery, large SMA thrombus, massive bilateral pulmonary embolisms. Her left upper extremity has no dopplerable pulses and is cool to the touch with increasing numbness.      - continue heparin gtt  - NPO + mIVF  - Dr. Chapis Almonte to discuss further operative intervention with the patient, son, and daughter at bedside    Sarabjit Saunders MD

## 2022-02-19 NOTE — VIRTUAL HEALTH
[facility] V[facility] Neurology Consult for  [facility] Stroke Alert through [address] @ 1241pm 2/19/2022 2/19/2022 12:45 PM    Pt Name: Dodie Wagner  MRN: [id number]  YOB: 1940  Date of evaluation: 2/19/2022  Primary Care Physician: Lana Odonnell MD  Reason for Evaluation: Stroke evaluation with Phone Consult, Discussion and Review of imaging    77yo female with pmh of arthritis, depression, hld, htn, hypothyroid, lymphedema. Pt presents with acute LUE weakness and numbness. At baseline pt has no focal deficits. lkn was 1030am 2/19/2022. Pt had sudden onset LUE weakness and numbness. sbp 205, glu 209. Allergies  has No Known Allergies. Medications  Prior to Admission medications    Medication Sig Start Date End Date Taking?  Authorizing Provider   DULoxetine (CYMBALTA) 20 MG extended release capsule Take 1 capsule by mouth daily 7/6/20   Lana Odonnell MD   furosemide (LASIX) 20 MG tablet Take 1 tablet by mouth daily 7/6/20   Lana Odonnell MD   potassium chloride (KLOR-CON M) 20 MEQ extended release tablet Take 1 tablet by mouth daily 7/6/20   Lana Odonnell MD   metFORMIN (GLUCOPHAGE) 500 MG tablet Take 1 tablet by mouth 2 times daily (with meals) 4/6/20   Rosalie Caceres MD   amitriptyline (ELAVIL) 25 MG tablet Take 1 tablet by mouth nightly  Patient not taking: Reported on 6/29/2020 1/24/20   Lana Odonnell MD   losartan-hydrochlorothiazide (HYZAAR) 100-25 MG per tablet Take 1 tablet by mouth daily 1/24/20   Lana Odonnell MD   atorvastatin (LIPITOR) 20 MG tablet Take 1 tablet by mouth daily 2/22/19   Lana Odonnell MD   cyclobenzaprine (FLEXERIL) 10 MG tablet Take 1 tablet by mouth nightly 8/14/18   Lana Odonnell MD   Handicap Placard MISC by Does not apply route 5 years 10/26/16   Lana Odonnell MD   Multiple Vitamins-Minerals (THERAPEUTIC MULTIVITAMIN-MINERALS) tablet Take 1 tablet by mouth daily    Historical Provider, MD   calcium carbonate (OSCAL) 500 MG TABS tablet Take 500 mg by mouth daily Indications: taking 3 timew a week     Historical Provider, MD    Scheduled Meds:   sodium chloride flush  10 mL IntraVENous Once     Continuous Infusions:  PRN Meds:. iopamidol  Past Medical History   has a past medical history of Anxiety, Arthritis, Depression, Diverticulitis, Dupuytren's contracture, Hyperlipidemia, Hypertension, Hypothyroidism, Lymphedema, and Pre-diabetes. Social History  Social History     Socioeconomic History    Marital status:      Spouse name: Not on file    Number of children: Not on file    Years of education: Not on file    Highest education level: Not on file   Occupational History    Not on file   Tobacco Use    Smoking status: Never Smoker    Smokeless tobacco: Never Used   Substance and Sexual Activity    Alcohol use: No     Alcohol/week: 0.0 standard drinks    Drug use: Not on file    Sexual activity: Not on file   Other Topics Concern    Not on file   Social History Narrative    Not on file     Social Determinants of Health     Financial Resource Strain:     Difficulty of Paying Living Expenses: Not on file   Food Insecurity:     Worried About Running Out of Food in the Last Year: Not on file    Irma of Food in the Last Year: Not on file   Transportation Needs:     Lack of Transportation (Medical): Not on file    Lack of Transportation (Non-Medical):  Not on file   Physical Activity:     Days of Exercise per Week: Not on file    Minutes of Exercise per Session: Not on file   Stress:     Feeling of Stress : Not on file   Social Connections:     Frequency of Communication with Friends and Family: Not on file    Frequency of Social Gatherings with Friends and Family: Not on file    Attends Holiness Services: Not on file    Active Member of Clubs or Organizations: Not on file    Attends Club or Organization Meetings: Not on file   Jenae Buchanan Marital Status: Not on file   Intimate Partner Violence:     Fear of Current or Ex-Partner: Not on file    Emotionally Abused: Not on file    Physically Abused: Not on file    Sexually Abused: Not on file   Housing Stability:     Unable to Pay for Housing in the Last Year: Not on file    Number of [place] in the Last Year: Not on file    Unstable Housing in the Last Year: Not on file     Family History  History reviewed. No pertinent family history. OBJECTIVE  Vitals:    22 1231   BP: (!) 205/95   Pulse: 80   Resp: 17   Temp: 97.5 °F (36.4 °C)   SpO2: 95%        Patient not seen in person. NIHSS calculated based on ED MD report. Severe LUE weakness, mild LUE numbness. NIH Stroke Scale:   1a  Level of consciousness: 0 - alert; keenly responsive   1b. LOC questions:  0 - answers both questions correctly   1c. LOC commands: 0 - performs both tasks correctly   2. Best Gaze: 0 - normal   3. Visual: 0 - no visual loss   4. Facial Palsy: 0 - normal symmetric movement   5a. Motor left arm: 3 - no effort against gravity, limb falls   5b. Motor right arm: 0 - no drift, limb holds 90 (or 45) degrees for full 10 seconds   6a. Motor left le - no drift; leg holds 30 degree position for full 5 seconds   6b  Motor right le - no drift; leg holds 30 degree position for full 5 seconds   7. Limb Ataxia: 0 - absent   8. Sensory: 1 - mild to moderate sensory loss; patient feels pinprick is less sharp or is dull on the affected side; there is a loss of superficial pain with pinprick but patient is aware of being touched    9. Best Language:  0 - no aphasia, normal   10. Dysarthria: 0 - normal   11. Extinction and Inattention: 0 - no abnormality         Total:   4     Premorbid MRS: 0 (estimated)      Imaging:  Images were personally reviewed with JOANA. AI used to review images including:  CT brain without contrast: no large territory hypodensity or bleed. Old R bg stroke.     Assessment  77yo female with pmh of arthritis, depression, hld, htn, hypothyroid, lymphedema. Pt presents with acute LUE weakness and numbness. Ct head neg for bleed, with old R BG stroke. ddx acute stroke, stroke recrudescence. Recommendations:  --tpa offered and given. --stat cta and ctp head and neck w con. If there is lvo consider mt. --sbp < 180  --additional recs to follow    Addendum:  Cta/p completed, reviewed on viz.ai. no LVO, no perfusion deficit. There is diffuse IC and cervical ICA athero, no severe stenosis. Plan updated:    --Admit to [facility] at [facility], no txf  --no mt, no lvo  --post tpa care and workup as per neuro  --recs as above    Addendum 2:  LUE weakness and numbness improved spontaneously, but there was evidence of desaturation and no pulse in the left arm. CTA neck shows loss of L subclavian opacification, concern for dissection versus thrombus. There is also evidence of b/l PE. recs updated:    --stat vasc surg consult  --spiral CT chest w con, eval PE  --hep gtt  --no tpa at this time. Sherly Reyes telestroke     Discussed with ED Physician    At least 30 min of Telemedicine and time in conversation directly with ED staff and physician for the patient who is in imminent and life threatening deterioration without further treatment and evaluation. This Virtual Visit was conducted with patient's (and/or legal guardian's) consent, to provide telestroke consultation and necessary medical care. Time spent examining patient, reviewing the images personally, reviewing the chart, perform high complexity decision making and speaking with the nursing staff regarding recommendations    This is a Phone Consult, I have not seen the patient face to face, the telemedicine device was not utilized.     Sherly Reyes MD, PhD  Stroke, Neurocritical Care And/or [address] [place] [address] Stroke Network  [facility]  Electronically signed 2/19/2022 at 12:45 PM

## 2022-02-19 NOTE — ED PROVIDER NOTES
Department of Emergency Medicine   ED  Provider Note  Admit Date/RoomTime: 2022 12:29 PM  ED Room:     NAME: Simran Mcadams  : 1940  MRN: [id number]     Chief Complaint:  Numbness (L arm numbness, started at 1030 today, -thinners, bgl 219 per ems)    History of Present Illness     LAST KNOWN WELL TIME & DATE: Today at 10:30 AM       Simran Mcadams is a 78 y.o. old female who presents to the emergency department for evaluation of strokelike symptoms. At 10:30 AM today, she had sudden onset left arm numbness, weakness and heaviness. She denied any chest pain or shortness of breath. She denied any symptoms in her face or leg. She is having no difficulty with vision or speaking. Patient is alert and oriented x3 and felt to be a good historian. I spoke with her son and daughter as well over the phone. Daughter states that she went to check on her mother at about 54 this morning. Patient told her that symptoms just started. Daughter feels mother to be a reliable historian. She had been complaining of some leg symptoms within the past week or so and states that her PCP increased her gabapentin dose. Otherwise, no history of falls or trauma. She does not take any blood thinning medications. No known prior history of stroke. Code Status on file: No Order. .  ROS   Pertinent positives and negatives are stated within HPI, all other systems reviewed and are negative. Past Medical History:  has a past medical history of Anxiety, Arthritis, Depression, Diverticulitis, Dupuytren's contracture, Hyperlipidemia, Hypertension, Hypothyroidism, Lymphedema, and Pre-diabetes. Surgical History:  has no past surgical history on file. Social History:  reports that she has never smoked. She has never used smokeless tobacco. She reports that she does not drink alcohol. Family History: family history is not on file. Allergies: Patient has no known allergies.     Physical Exam Oxygen Saturation Interpretation: Normal.        ED Triage Vitals [02/19/22 1231]   BP Temp Temp src Pulse Resp SpO2 Height Weight   (!) 205/95 97.5 °F (36.4 °C) -- 80 17 95 % -- --         Constitutional:   Level of Consciousness: Awake and alert. ETOH: No.         Distress: none. Cooperativeness: cooperative. Eyes:  PERRL, EOMI, no discharge or conjunctival injection. Ears:  External ears without lesions. Throat:  Pharynx without injection, exudate, or tonsillar hypertrophy. Airway patient. Neck:  Normal ROM. Supple. Respiratory:  Clear to auscultation and breath sounds equal.  CV:  Regular rate and rhythm, normal heart sounds, without pathological murmurs, ectopy, gallops, or rubs. GI: Abdomen Soft, nontender, good bowel sounds. No firm or pulsatile mass. Back:  No costovertebral tenderness. Integument:  Normal turgor. Warm, dry, without visible rash, unless noted elsewhere. Lymphatic: no lymphadenopathy noted  Neurological:       Orientation: person, place and time. Memory:             short term impairment: No.             Long term impairment: No.       CN II-XII: cranial nerves II-XII are grossly intact. Motor function:            Arm(s): Left weak. Leg(s): Bilateral normal.       Cerebellar function:            Tremor: No.              Past-pointing: No.             Limb Ataxia: No.       Sensory function:            Arm(s): Left diminished.             Leg(s): Bilateral normal.            Face: Bilateral normal.    Lab / Imaging Results   (All laboratory and radiology results have been personally reviewed by myself)  Labs:  Results for orders placed or performed during the hospital encounter of 02/19/22   COVID-19, Rapid    Specimen: Nasopharyngeal Swab   Result Value Ref Range    SARS-CoV-2, NAAT Not Detected Not Detected   CBC with Auto Differential   Result Value Ref Range    WBC 9.1 4.5 - 11.5 E9/L    RBC 5.71 (H) 3.50 - 5.50 E12/L    Hemoglobin 13.8 11.5 - 15.5 g/dL    Hematocrit 44.2 34.0 - 48.0 %    MCV 77.4 (L) 80.0 - 99.9 fL    MCH 24.2 (L) 26.0 - 35.0 pg    MCHC 31.2 (L) 32.0 - 34.5 %    RDW 15.8 (H) 11.5 - 15.0 fL    Platelets 941 285 - 570 E9/L    MPV 9.3 7.0 - 12.0 fL    Neutrophils % 71.4 43.0 - 80.0 %    Immature Granulocytes % 0.6 0.0 - 5.0 %    Lymphocytes % 20.6 20.0 - 42.0 %    Monocytes % 5.3 2.0 - 12.0 %    Eosinophils % 1.5 0.0 - 6.0 %    Basophils % 0.6 0.0 - 2.0 %    Neutrophils Absolute 6.47 1.80 - 7.30 E9/L    Immature Granulocytes # 0.05 E9/L    Lymphocytes Absolute 1.86 1.50 - 4.00 E9/L    Monocytes Absolute 0.48 0.10 - 0.95 E9/L    Eosinophils Absolute 0.14 0.05 - 0.50 E9/L    Basophils Absolute 0.05 0.00 - 0.20 E9/L   Protime-INR   Result Value Ref Range    Protime 12.1 9.3 - 12.4 sec    INR 1.1    APTT   Result Value Ref Range    aPTT 26.9 24.5 - 35.1 sec   Urinalysis   Result Value Ref Range    Color, UA Yellow Straw/Yellow    Clarity, UA Clear Clear    Glucose, Ur Negative Negative mg/dL    Bilirubin Urine Negative Negative    Ketones, Urine Negative Negative mg/dL    Specific Gravity, UA 1.010 1.005 - 1.030    Blood, Urine Negative Negative    pH, UA 7.0 5.0 - 9.0    Protein, UA Negative Negative mg/dL    Urobilinogen, Urine 0.2 <2.0 E.U./dL    Nitrite, Urine Negative Negative    Leukocyte Esterase, Urine Negative Negative   APTT   Result Value Ref Range    aPTT >240.0 (H) 24.5 - 35.1 sec   SPECIMEN REJECTION   Result Value Ref Range    Rejected Test CMP TRP5     Reason for Rejection see below    Comprehensive Metabolic Panel   Result Value Ref Range    Sodium 136 132 - 146 mmol/L    Potassium 3.8 3.5 - 5.0 mmol/L    Chloride 101 98 - 107 mmol/L    CO2 23 22 - 29 mmol/L    Anion Gap 12 7 - 16 mmol/L    Glucose 166 (H) 74 - 99 mg/dL    BUN 16 6 - 23 mg/dL    CREATININE 0.7 0.5 - 1.0 mg/dL    GFR Non-African American >60 >=60 mL/min/1.73    GFR African American >60     Calcium 9.2 8.6 - 10.2 mg/dL    Total Protein 6.6 6.4 - 8.3 g/dL Albumin 3.5 3.5 - 5.2 g/dL    Total Bilirubin 0.3 0.0 - 1.2 mg/dL    Alkaline Phosphatase 74 35 - 104 U/L    ALT 38 (H) 0 - 32 U/L    AST 26 0 - 31 U/L   Troponin   Result Value Ref Range    Troponin, High Sensitivity 55 (H) 0 - 9 ng/L   POCT Glucose   Result Value Ref Range    Glucose 171 mg/dL    QC OK? ok    POCT Glucose   Result Value Ref Range    Meter Glucose 171 (H) 74 - 99 mg/dL   EKG 12 Lead   Result Value Ref Range    Ventricular Rate 78 BPM    Atrial Rate 78 BPM    P-R Interval 146 ms    QRS Duration 80 ms    Q-T Interval 474 ms    QTc Calculation (Bazett) 540 ms    P Axis 31 degrees    R Axis 6 degrees    T Axis -65 degrees     Imaging: All Radiology results interpreted by Radiologist unless otherwise noted. XR CHEST PORTABLE   Preliminary Result   Pneumonia seen within the left lung and right mid and lower lung in the   periphery. CTA CHEST W CONTRAST   Final Result   Extensive bilateral pulmonary emboli most pronounced in the right main   pulmonary artery where there is a large volume of thrombus. There is   evidence of right heart strain by CT criteria. Incidentally noted acute thrombus within the superior mesenteric artery as   well as the abdominal aorta extending from the level of the superior   mesenteric ostium inferiorly into the mid abdominal aorta. Vascular surgery   consultation is recommended. Dedicated CTA of the abdomen/pelvis should be   considered as well. 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 No   contrast is visualized within the imaged arterial structures of the left   upper extremity. Please refer to the dedicated CTA of the left upper   extremity for additional details. 6 cm low-density lesion within the inferior aspect of the right hepatic lobe   with Hounsfield units slightly higher than that.   After acute treatment,   recommend correlation with right upper quadrant ultrasound would be expected   for a simple cyst to ensure that this is a simple cyst.      Additional, incidental findings as above. Critical results were called by Dr. Narda Edwards MD to Dr. Stefany Burkett on 2/19/2022 at 14:26. CTA UPPER EXTREMITY LEFT W CONTRAST   Final Result   1. Significant pulmonary embolism occluding the right main pulmonary artery   with several emboli in the left upper and lower lobe pulmonary arteries. Evidence of right heart strain. 2.  Linear filling defect within the proximal right common carotid artery and   right subclavian artery which could represent additional thrombus or very   short segment of dissection. Thrombus is favored. 3.  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 4.  Large linear thrombus extending into the superior mesenteric artery with   a portion remaining in the abdominal aorta. 2 Critical results were called by Dr. Vesta Wolf MD to Damian Quinn on   2/19/2022 at 14:26. CT BRAIN PERFUSION   Final Result      No significant ischemic penumbra identified      This study was analyzed by the [employer] ai algorithm. CTA HEAD W CONTRAST   Final Result   1. Estimated stenosis of the proximal right and left internal carotid   artery by NASCET criteria is not hemodynamically significant   2. Moderate atherosclerotic disease . 3. No large vessel occlusion identified   4. Findings consistent with bilateral pulmonary emboli   5. Findings suspicious for right hilar mass   6. Left subclavian thrombosis, without convincing atherosclerotic   disease this would suggest emboli            This study was analyzed by the [address] ai algorithm. CTA NECK W CONTRAST   Final Result   1. Estimated stenosis of the proximal right and left internal carotid   artery by NASCET criteria is not hemodynamically significant   2. Moderate atherosclerotic disease . 3.  No large vessel occlusion identified   4. Findings consistent with bilateral pulmonary emboli   5. Findings suspicious for right hilar mass   6. Left subclavian thrombosis, without convincing atherosclerotic   disease this would suggest emboli            This study was analyzed by the [address] ai algorithm. CT HEAD WO CONTRAST   Final Result   Atrophy and age-related changes. Old lacunar infarct in the right basal   ganglia. No acute intracranial hemorrhage. No large territory infarct. Critical results were called by Dr. Mike Abarca MD to Erick Castillo on   2/19/2022 at 13:12. EKG #1:  Interpreted by emergency department attending physician unless otherwise noted. 2/19/22  Time: 14:11    Rhythm: normal sinus   Rate: 78  Axis: normal  Conduction: normal  ST Segments: nonspecific changes  T Waves: inversion in  eugenia-lateral leads and inferior leads    Clinical Impression: myocardial injury  Comparison to Prior tracings: There are new findings on today's ECG when compared to prior tracings.     ED Course / Medical Decision Making     Medications   heparin (porcine) injection 6,950 Units (has no administration in time range)   heparin (porcine) injection 3,480 Units (has no administration in time range)   heparin 25,000 units in dextrose 5% 250 mL (premix) infusion (18 Units/kg/hr × 86.9 kg IntraVENous New Bag 2/19/22 1344)   ceFAZolin (ANCEF) 3,000 mg in dextrose 5 % 100 mL IVPB (has no administration in time range)   sodium chloride flush 0.9 % injection 10 mL (10 mLs IntraVENous Given 2/19/22 1320)   iopamidol (ISOVUE-370) 76 % injection 105 mL (105 mLs IntraVENous Given 2/19/22 1259)   heparin (porcine) injection 6,950 Units (6,950 Units IntraVENous Given 2/19/22 1343)   sodium chloride flush 0.9 % injection 10 mL (10 mLs IntraVENous Given 2/19/22 1327)   iopamidol (ISOVUE-370) 76 % injection 90 mL (90 mLs IntraVENous Given 2/19/22 1327)        Re-Evaluations:  2/19/22      Time:1:00 PM   Patients symptoms are changing. Left arm weakness mostly resolved. Numbness improved. Left hand appears dusky. Pulse ox 84% on L hand, was normal on R hand. I was unable to palpate or doppler a radial pulse. Concern patient may be dissecting. NO TPA given. Patient taken back over for STAT CTA chest and LUE. Vascular surgery consult placed. Consultations:             IP CONSULT TO VASCULAR SURGERY  IP CONSULT TO INTERNAL MEDICINE    Procedures:   none    MDM: Patient presents to the ED for evaluation of acute left arm weakness and numbness. Initially, there was concern for stroke. However, her weakness and numbness improved and on reevaluation her left hand appeared dusky and was cool. We were unable to palpate or Doppler a radial pulse. She was sent back to CT imaging. There was concern for a possible hilar mass. She also had multiple concerning findings including left axillary and subclavian artery occlusion, bilateral pulmonary emboli with right heart strain as well as an SMA thrombus. Patient was started on a heparin drip and evaluated by vascular surgery. Patient will be taken to the OR for SMA and LUE embolectomy as well as a vena cava filter. She will be admitted to ICU for further management. CRITICAL CARE:   120 MINUTES. Please note that the withdrawal or failure to initiate urgent interventions for this patient would likely result in a life threatening deterioration or permanent disability. Accordingly this patient received the above mentioned time, excluding separately billable procedures. Plan of Care/Counseling:  Philip Daniel DO reviewed today's visit with the patient and son(s) and daughter(s) in addition to providing specific details for the plan of care and counseling regarding the diagnosis and prognosis. Questions are answered at this time and are agreeable with the plan. Assessment      1. Acute saddle pulmonary embolism with acute cor pulmonale (HCC)    2.  Left axillary artery thrombus ([address]    3. 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 Ischemia of left upper extremity      This patient's ED course included: a personal history and physicial examination  This patient has remained hemodynamically stable during their ED course. Plan   Admit to Surgical ICU. Patient condition is serious. New Medications     New Prescriptions    No medications on file     Electronically signed by Kasandra Pantoja DO   DD: 2/19/22  **This report was transcribed using voice recognition software. Every effort was made to ensure accuracy; however, inadvertent computerized transcription errors may be present.   END OF PROVIDER NOTE          Kasandra Pantoja DO  02/19/22 2283

## 2022-02-19 NOTE — ED NOTES
LKW: 10:30 AM TODAY. PER EMS, WITNESSED BY DAUGHTER. LEFT ARM WEAKNESS AND NUMBNESS. NO BLOOD THINNERS. . NIH Stroke Scale/Score at time of initial evaluation:  1A: Level of Consciousness 0 - alert; keenly responsive   1B: Ask Month and Age 0 - answers both questions correctly   1C: Tell Patient To Open and Close Eyes, then Hand  Squeeze 0 - performs both tasks correctly   2: Test Horizontal Extraocular Movements 0 - normal   3: Test Visual Fields 0 - no visual loss   4: Test Facial Palsy 0 - normal symmetric movement   5A: Test Left Arm Motor Drift 3 - no effort against gravity, limb falls   5B: Test Right Arm Motor Drift 0 - no drift, limb holds 90 (or 45) degrees for full 10 seconds   6A: Test Left Leg Motor Drift 0 - no drift; leg holds 30 degree position for full 5 seconds   6B: Test Right Leg Motor Drift 0 - no drift; leg holds 30 degree position for full 5 seconds   7: Test Limb Ataxia   (FNF/Heel-Shin) 0 - absent   8: Test Sensation 1 - mild to moderate sensory loss; patient feels pinprick is less sharp or is dull on the affected side; there is a loss of superficial pain with pinprick but patient is aware of being touched    9: Test Language/Aphasia 0 - no aphasia, normal   10: Test Dysarthria 0 - normal   11: Test Extinction/Inattention 0 - no abnormality   Total Score: 4   2/19/22 at 12:37 PM EST.          Joo Westbrook,   02/19/22 4691

## 2022-02-19 NOTE — LETTER
[address] Medicaid  CERTIFICATION OF NECESSITY  FOR TRANSPORTATION   BY WHEELCHAIR VAN     Individual Information   1. Name: Arturo Suresh 2. [place] Medicaid Billing Number: facility    3. Address: [address] [place] [address]      Transportation Provider Information   4. Provider Name: Nyasia Claros    5. [place] Medicaid Provider Number:  National Provider Identifier (NPI):      Certification  7. Criteria:  By signing this document, the practitioner certifies that two statements are true:  A. This individual must be accompanied by a mobility-related assistive device from the point of pick-up to the point of drop-off. B. Transport of this individual by standard passenger vehicle or common carrier is precluded or contraindicated. 8. Period Beginning Date: 02/28/2022   9. Length  [] Not more than 1 day(s)  [] One Year     Additional Information Relevant to Certification   10. Comments or Explanations, If Necessary or Appropriate   o2 janessa nc. Saddle PE, weakness      Certifying Practitioner Information   11. Name of Practitioner: Toribio Krishnamurthy M.D.    12. [facility] Provider Number, If Applicable:   [address] Provider Identifier (NPI):      Signature Information   14. Date of Signature: 02/28/2022 15. Name of Person Signing: Heidi Chirinos    12.  Signature and Professional Designation: Electronically signed by BERNARDO Maciel on 2/28/2022 at 2:53 PM       [facility] [id number]  Rev. 7/2015

## 2022-02-19 NOTE — LETTER
[place] [employer] Medicaid  CERTIFICATION OF NECESSITY  FOR NON-EMERGENCY TRANSPORTATION   BY GROUND AMBULANCE      Individual Information   1. Name: Annita Mcardle 2. [place] Medicaid Billing Number:    3. Address: [address] [place] [address]    Transportation Provider Information   4. Provider Name:    5. [place] Medicaid Provider Number:  National Provider Identifier (NPI):    Certification  7. Criteria:  During transport, this individual requires:  [x] Medical treatment or continuous     supervision by an EMT. [x] The administration or regulation of oxygen by another person. [] Supervised protective restraint. 8. Period Beginning Date: 02/28/22   9. Length  [x] Not more than 10 day(s)  [] One Year   Additional Information Relevant to Certification   10. Comments or Explanations, If Necessary or Appropriate   Unable to maintain erect sitting position in a chair for time needed to transport, due to moderate weakness and de-conditioning   Certifying Practitioner Information   11. Name of Practitioner: DR Divya Schaefer    12. [place] Medicaid Provider Number, If Applicable:  [address] Provider Identifier (NPI):    Signature Information   14. Date of Signature: 02/28/22 15. Name of Person Signing: Electronically signed by Micky Colindres RN on 2/28/2022 at 11:27 AM    16.  Signature and Professional Designation: Electronically signed by Micky Colindres RN on 2/28/2022 at 11:27 AM      [place] [address]  Rev. 7/2015

## 2022-02-19 NOTE — ANESTHESIA PRE PROCEDURE
Department of Anesthesiology  Preprocedure Note       Name:  Brian Doty   Age:  78 y.o.  :  1940                                          MRN:  [id number]         Date:  2022      Surgeon: Albino Giles):  Nathanael Mckeon MD    Procedure: Procedure(s):  VENA CAVA FILTER INSERTION  SMA EMBOLECTOMY  LEFT ARM  EMBOLECTOMY    Medications prior to admission:   Prior to Admission medications    Medication Sig Start Date End Date Taking?  Authorizing Provider   DULoxetine (CYMBALTA) 20 MG extended release capsule Take 1 capsule by mouth daily 20   Shane Chaidez MD   furosemide (LASIX) 20 MG tablet Take 1 tablet by mouth daily 20   Shane Chaidez MD   potassium chloride (KLOR-CON M) 20 MEQ extended release tablet Take 1 tablet by mouth daily 20   Shane Chaidez MD   metFORMIN (GLUCOPHAGE) 500 MG tablet Take 1 tablet by mouth 2 times daily (with meals) 20   Rosalie Caceres MD   amitriptyline (ELAVIL) 25 MG tablet Take 1 tablet by mouth nightly  Patient not taking: Reported on 2020   Shane Chaidez MD   losartan-hydrochlorothiazide (HYZAAR) 100-25 MG per tablet Take 1 tablet by mouth daily 20   Shane Chaidez MD   atorvastatin (LIPITOR) 20 MG tablet Take 1 tablet by mouth daily 19   Shane Chaidez MD   cyclobenzaprine (FLEXERIL) 10 MG tablet Take 1 tablet by mouth nightly 18   Shane Chaidez MD   Handicap Placard MISC by Does not apply route 5 years 10/26/16   Shane Chaidez MD   Multiple Vitamins-Minerals (THERAPEUTIC MULTIVITAMIN-MINERALS) tablet Take 1 tablet by mouth daily    Historical Provider, MD   calcium carbonate (OSCAL) 500 MG TABS tablet Take 500 mg by mouth daily Indications: taking 3 timew a week     Historical Provider, MD       Current medications:    Current Facility-Administered Medications   Medication Dose Route Frequency Provider Last Rate Last Admin    heparin (porcine) injection 6,950 Units 80 Units/kg IntraVENous PRN Montse Minor, DO        heparin (porcine) injection 3,480 Units  40 Units/kg IntraVENous PRN Montse Minor, DO        heparin 25,000 units in dextrose 5% 250 mL (premix) infusion  18 Units/kg/hr IntraVENous Continuous Niru Espinal DO 15.6 mL/hr at 02/19/22 1344 18 Units/kg/hr at 02/19/22 1344    ceFAZolin (ANCEF) 3,000 mg in dextrose 5 % 100 mL IVPB  3,000 mg IntraVENous 60 Min Pre-Op Jose Hsu MD         Current Outpatient Medications   Medication Sig Dispense Refill    DULoxetine (CYMBALTA) 20 MG extended release capsule Take 1 capsule by mouth daily 30 capsule 3    furosemide (LASIX) 20 MG tablet Take 1 tablet by mouth daily 30 tablet 3    potassium chloride (KLOR-CON M) 20 MEQ extended release tablet Take 1 tablet by mouth daily 30 tablet 3    metFORMIN (GLUCOPHAGE) 500 MG tablet Take 1 tablet by mouth 2 times daily (with meals) 180 tablet 3    amitriptyline (ELAVIL) 25 MG tablet Take 1 tablet by mouth nightly (Patient not taking: Reported on 6/29/2020) 90 tablet 5    losartan-hydrochlorothiazide (HYZAAR) 100-25 MG per tablet Take 1 tablet by mouth daily 90 tablet 5    atorvastatin (LIPITOR) 20 MG tablet Take 1 tablet by mouth daily 90 tablet 3    cyclobenzaprine (FLEXERIL) 10 MG tablet Take 1 tablet by mouth nightly 30 tablet 0    Handicap Placard MISC by Does not apply route 5 years 1 each 0    Multiple Vitamins-Minerals (THERAPEUTIC MULTIVITAMIN-MINERALS) tablet Take 1 tablet by mouth daily      calcium carbonate (OSCAL) 500 MG TABS tablet Take 500 mg by mouth daily Indications: taking 3 timew a week          Allergies:  No Known Allergies    Problem List:    Patient Active Problem List   Diagnosis Code    Essential hypertension, benign I10    Lymphedema I89.0    Osteoarthritis M19.90    Hyperlipidemia E78.5    Anxiety F41.9       Past Medical History:        Diagnosis Date    Anxiety     Arthritis     osteoarthritis    Depression     Diverticulitis  Dupuytren's contracture     Hyperlipidemia     Hypertension     Hypothyroidism     Lymphedema     Pre-diabetes        Past Surgical History:  History reviewed. No pertinent surgical history. Social History:    Social History     Tobacco Use    Smoking status: Never Smoker    Smokeless tobacco: Never Used   Substance Use Topics    Alcohol use: No     Alcohol/week: 0.0 standard drinks                                Counseling given: Not Answered      Vital Signs (Current):   Vitals:    02/19/22 1256 02/19/22 1308 02/19/22 1320 02/19/22 1526   BP: (!) 211/103 (!) 190/91  (!) 170/87   Pulse: 86 71  72   Resp: 16 16  14   Temp:       SpO2: (!) 84% 96%  95%   Weight:       Height:   5' 1\" (1.549 m)                                               BP Readings from Last 3 Encounters:   02/19/22 (!) 170/87   06/29/20 120/82   01/24/20 130/84       NPO Status:                                                                                 BMI:   Wt Readings from Last 3 Encounters:   02/19/22 191 lb 9.3 oz (86.9 kg)   06/29/20 191 lb 9.6 oz (86.9 kg)   01/24/20 193 lb 3.2 oz (87.6 kg)     Body mass index is 36.2 kg/m². CBC:   Lab Results   Component Value Date    WBC 9.1 02/19/2022    RBC 5.71 02/19/2022    HGB 13.8 02/19/2022    HCT 44.2 02/19/2022    MCV 77.4 02/19/2022    RDW 15.8 02/19/2022     02/19/2022       CMP:   Lab Results   Component Value Date     02/19/2022    K 3.8 02/19/2022     02/19/2022    CO2 23 02/19/2022    BUN 16 02/19/2022    CREATININE 0.7 02/19/2022    GFRAA >60 02/19/2022    LABGLOM >60 02/19/2022    GLUCOSE 166 02/19/2022    PROT 6.6 02/19/2022    CALCIUM 9.2 02/19/2022    BILITOT 0.3 02/19/2022    ALKPHOS 74 02/19/2022    AST 26 02/19/2022    ALT 38 02/19/2022       POC Tests: No results for input(s): POCGLU, POCNA, POCK, POCCL, POCBUN, POCHEMO, POCHCT in the last 72 hours.     Coags:   Lab Results   Component Value Date    PROTIME 12.1 02/19/2022    INR 1.1 02/19/2022    APTT >240.0 02/19/2022       HCG (If Applicable): No results found for: PREGTESTUR, PREGSERUM, HCG, HCGQUANT     ABGs: No results found for: PHART, PO2ART, AKM2VLW, ACL6JPM, BEART, H7RYPFIP     Type & Screen (If Applicable):  No results found for: LABABO, LABRH    Drug/Infectious Status (If Applicable):  No results found for: HIV, HEPCAB    COVID-19 Screening (If Applicable):   Lab Results   Component Value Date    COVID19 Not Detected 02/19/2022           Anesthesia Evaluation  Patient summary reviewed no history of anesthetic complications:   Airway: Mallampati: II  TM distance: >3 FB   Neck ROM: full  Mouth opening: > = 3 FB Dental:          Pulmonary: breath sounds clear to auscultation                            ROS comment: CXR 2/19/2022:  Pneumonia seen within the left lung and right mid and lower lung in the  periphery.     CTA Chest 2/2022:  Extensive bilateral pulmonary emboli most pronounced in the right main  pulmonary artery where there is a large volume of thrombus. Yue Sutter is  evidence of right heart strain by CT criteria.     Incidentally noted acute thrombus within the superior mesenteric artery as  well as the abdominal aorta extending from the level of the superior  mesenteric ostium inferiorly into the mid abdominal aorta.  Vascular surgery  consultation is recommended.  Dedicated CTA of the abdomen/pelvis should be  considered as well.     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  No  contrast is visualized within the imaged arterial structures of the left  upper extremity.  Please refer to the dedicated CTA of the left upper  extremity for additional details.     6 cm low-density lesion within the inferior aspect of the right hepatic lobe  with Hounsfield units slightly higher than that.  After acute treatment,  recommend correlation with right upper quadrant ultrasound would be expected  for a simple cyst to ensure that this is a simple cyst.    Cardiovascular:    (+) hypertension:, hyperlipidemia      ECG reviewed  Rhythm: regular  Rate: normal                 ROS comment: EKG 2/2022:  Normal sinus rhythm T wave abnormality, consider inferior ischemia T wave abnormality, consider anterolateral ischemia Prolonged QT Abnormal ECG No previous ECGs availabl    ELEVATED TROPONIN, I suspect 2/2 to right heart strain     Neuro/Psych:   (+) neuromuscular disease (Lumbar radiculopathy):, psychiatric history (Anxiety):            GI/Hepatic/Renal: Neg GI/Hepatic/Renal ROS            Endo/Other:    (+) DiabetesType II DM, , hypothyroidism, blood dyscrasia: anticoagulation therapy, arthritis:., .                 Abdominal:   (+) obese,           Vascular:   + DVT, PE.        ROS comment: Lymphedema    CT PE 2/2022:  1.  Significant pulmonary embolism occluding the right main pulmonary artery  with several emboli in the left upper and lower lobe pulmonary arteries. Evidence of right heart strain.     2.  Linear filling defect within the proximal right common carotid artery and  right subclavian artery which could represent additional thrombus or very  short segment of dissection.  Thrombus is favored.     3.  Complete occlusion of the subclavian artery as it crosses the 1st rib  with no arterial flow in the left axillary or brachial arteries.     4.  Large linear thrombus extending into the superior mesenteric artery with  a portion remaining in the abdominal aorta. . Other Findings:           Anesthesia Plan      general     ASA 4 - emergent       Induction: intravenous. arterial line and central line  MIPS: Postoperative opioids intended and Prophylactic antiemetics administered. Anesthetic plan and risks discussed with patient and child/children. Use of blood products discussed with patient and child/children whom consented to blood products.                Matthew Martins MD   2/19/2022        -------DOS anesthesiologist addendum-----  Patient seen and evaluated. I explained to patient and her family that we will have to be very cautious with managing patient intraoperatively due to her massive PE with RV strain and co-existant arterial emboli requiring ongoing heparin infusion. Discussion held with surgeon regarding management options (including TPA) given her massive PE, arterial emboli, and ongoing heparin infusion. I explained to patient and her family that we may require postoperative mechanical ventilation. Patient and patient's family consent to and agree to GETA, arterial line, CVC, and postoperative mechanical ventilation if necessary. Patient and patient's family consent to and agree to transfusion of blood or blood products. All their questions were answered to their satisfaction.     Clover Ramírez MD  2/19/22

## 2022-02-19 NOTE — ED NOTES
Time Neurologist page:[id number] liaw      Time Stroke Alert called:1235  :    Time Neurologist called [id number] liaw    X-Ray/CT notified:1235     Psychiatric  02/19/22 [address] [place] [address]

## 2022-02-19 NOTE — ED NOTES
Verbal report given to Shahid Alex in SICU, of pt status while in ED      Juan Antonio Dolan RN  02/19/22 2368

## 2022-02-19 NOTE — ED NOTES
Radiology Procedure Waiver   Name: Radha Irving  : 1940  MRN: [id number]    Date:  22    Time: 1:21 PM EST    Benefits of immediately proceeding with Radiology exam(s) without pre-testing outweigh the risks or are not indicated as specified below and therefore the following is/are being waived:    [] Pregnancy test   [] Patients LMP on-time and regular.   [] Patient had Tubal Ligation or has other Contraception Device. [] Patient  is Menopausal or Premenarcheal.    [] Patient had Full or Partial Hysterectomy. [] Protocol for Iodine allergy    [] MRI Questionnaire     [x] BUN/Creatinine   [] Patient age w/no hx of renal dysfunction. [] Patient on Dialysis. [] Recent Normal Labs.   Electronically signed by Scottie Land DO on 22 at 1:21 PM EST               Scottie Land DO  [phone]

## 2022-02-20 ENCOUNTER — APPOINTMENT (OUTPATIENT)
Dept: GENERAL RADIOLOGY | Age: 82
DRG: 270 | End: 2022-02-20
Payer: MEDICARE

## 2022-02-20 LAB
AADO2: 148.3 MMHG
AADO2: 383 MMHG
ALBUMIN SERPL-MCNC: 3.1 G/DL (ref 3.5–5.2)
ALP BLD-CCNC: 63 U/L (ref 35–104)
ALT SERPL-CCNC: 33 U/L (ref 0–32)
ANION GAP SERPL CALCULATED.3IONS-SCNC: 13 MMOL/L (ref 7–16)
APTT: 107.2 SEC (ref 24.5–35.1)
APTT: 55 SEC (ref 24.5–35.1)
APTT: 69 SEC (ref 24.5–35.1)
APTT: 69.2 SEC (ref 24.5–35.1)
AST SERPL-CCNC: 20 U/L (ref 0–31)
B.E.: -1.8 MMOL/L (ref -3–3)
B.E.: -5 MMOL/L (ref -3–3)
BASOPHILS ABSOLUTE: 0.03 E9/L (ref 0–0.2)
BASOPHILS RELATIVE PERCENT: 0.2 % (ref 0–2)
BILIRUB SERPL-MCNC: 0.3 MG/DL (ref 0–1.2)
BUN BLDV-MCNC: 15 MG/DL (ref 6–23)
C-REACTIVE PROTEIN: 8.7 MG/DL (ref 0–0.4)
CALCIUM IONIZED: 1.16 MMOL/L (ref 1.15–1.33)
CALCIUM SERPL-MCNC: 8.5 MG/DL (ref 8.6–10.2)
CHLORIDE BLD-SCNC: 104 MMOL/L (ref 98–107)
CO2: 22 MMOL/L (ref 22–29)
COHB: 0.2 % (ref 0–1.5)
COHB: 0.3 % (ref 0–1.5)
COMMENT: ABNORMAL
CREAT SERPL-MCNC: 0.7 MG/DL (ref 0.5–1)
CREATININE URINE: 337 MG/DL (ref 29–226)
CRITICAL: ABNORMAL
CRITICAL: ABNORMAL
D DIMER: 4531 NG/ML DDU
DATE ANALYZED: ABNORMAL
DATE ANALYZED: ABNORMAL
DATE OF COLLECTION: ABNORMAL
DATE OF COLLECTION: ABNORMAL
EOSINOPHILS ABSOLUTE: 0.01 E9/L (ref 0.05–0.5)
EOSINOPHILS RELATIVE PERCENT: 0.1 % (ref 0–6)
FIO2: 40 %
FIO2: 80 %
GFR AFRICAN AMERICAN: >60
GFR NON-AFRICAN AMERICAN: >60 ML/MIN/1.73
GLUCOSE BLD-MCNC: 191 MG/DL (ref 74–99)
HCO3: 19.6 MMOL/L (ref 22–26)
HCO3: 21.9 MMOL/L (ref 22–26)
HCT VFR BLD CALC: 32.5 % (ref 34–48)
HCT VFR BLD CALC: 33.5 % (ref 34–48)
HCT VFR BLD CALC: 33.9 % (ref 34–48)
HCT VFR BLD CALC: 38.3 % (ref 34–48)
HEMOGLOBIN: 10.1 G/DL (ref 11.5–15.5)
HEMOGLOBIN: 10.4 G/DL (ref 11.5–15.5)
HEMOGLOBIN: 10.7 G/DL (ref 11.5–15.5)
HEMOGLOBIN: 11.9 G/DL (ref 11.5–15.5)
HHB: 1.9 % (ref 0–5)
HHB: 3.5 % (ref 0–5)
HOMOCYSTEINE: 4.3 UMOL/L (ref 0–15)
IMMATURE GRANULOCYTES #: 0.06 E9/L
IMMATURE GRANULOCYTES %: 0.5 % (ref 0–5)
LAB: ABNORMAL
LAB: ABNORMAL
LACTIC ACID: 1.2 MMOL/L (ref 0.5–2.2)
LACTIC ACID: 2.5 MMOL/L (ref 0.5–2.2)
LUPUS ANTICOAG DVVT: NEGATIVE
LV EF: 75 %
LVEF MODALITY: NORMAL
LYMPHOCYTES ABSOLUTE: 1.26 E9/L (ref 1.5–4)
LYMPHOCYTES RELATIVE PERCENT: 10.3 % (ref 20–42)
Lab: ABNORMAL
Lab: ABNORMAL
MAGNESIUM: 2 MG/DL (ref 1.6–2.6)
MCH RBC QN AUTO: 24.3 PG (ref 26–35)
MCH RBC QN AUTO: 24.5 PG (ref 26–35)
MCH RBC QN AUTO: 24.5 PG (ref 26–35)
MCH RBC QN AUTO: 24.9 PG (ref 26–35)
MCHC RBC AUTO-ENTMCNC: 31 % (ref 32–34.5)
MCHC RBC AUTO-ENTMCNC: 31.1 % (ref 32–34.5)
MCHC RBC AUTO-ENTMCNC: 31.1 % (ref 32–34.5)
MCHC RBC AUTO-ENTMCNC: 31.6 % (ref 32–34.5)
MCV RBC AUTO: 78.3 FL (ref 80–99.9)
MCV RBC AUTO: 78.8 FL (ref 80–99.9)
MCV RBC AUTO: 78.8 FL (ref 80–99.9)
MCV RBC AUTO: 78.9 FL (ref 80–99.9)
METER GLUCOSE: 109 MG/DL (ref 74–99)
METER GLUCOSE: 121 MG/DL (ref 74–99)
METER GLUCOSE: 133 MG/DL (ref 74–99)
METER GLUCOSE: 155 MG/DL (ref 74–99)
METER GLUCOSE: 177 MG/DL (ref 74–99)
METHB: 0.2 % (ref 0–1.5)
METHB: 0.3 % (ref 0–1.5)
MODE: ABNORMAL
MODE: AC
MONOCYTES ABSOLUTE: 0.91 E9/L (ref 0.1–0.95)
MONOCYTES RELATIVE PERCENT: 7.4 % (ref 2–12)
NEUTROPHILS ABSOLUTE: 9.97 E9/L (ref 1.8–7.3)
NEUTROPHILS RELATIVE PERCENT: 81.5 % (ref 43–80)
O2 SATURATION: 96.5 % (ref 92–98.5)
O2 SATURATION: 98.1 % (ref 92–98.5)
O2HB: 96 % (ref 94–97)
O2HB: 97.6 % (ref 94–97)
OPERATOR ID: 1632
OPERATOR ID: 7292
PATIENT TEMP: 37 C
PATIENT TEMP: 37 C
PCO2: 33.4 MMHG (ref 35–45)
PCO2: 35.2 MMHG (ref 35–45)
PDW BLD-RTO: 15 FL (ref 11.5–15)
PDW BLD-RTO: 15 FL (ref 11.5–15)
PDW BLD-RTO: 15.1 FL (ref 11.5–15)
PDW BLD-RTO: 15.1 FL (ref 11.5–15)
PEEP/CPAP: 8 CMH2O
PEEP/CPAP: 8 CMH2O
PFO2: 1.63 MMHG/%
PFO2: 2.21 MMHG/%
PH BLOOD GAS: 7.36 (ref 7.35–7.45)
PH BLOOD GAS: 7.43 (ref 7.35–7.45)
PHOSPHORUS: 3.5 MG/DL (ref 2.5–4.5)
PLATELET # BLD: 233 E9/L (ref 130–450)
PLATELET # BLD: 233 E9/L (ref 130–450)
PLATELET # BLD: 239 E9/L (ref 130–450)
PLATELET # BLD: 257 E9/L (ref 130–450)
PMV BLD AUTO: 9.3 FL (ref 7–12)
PMV BLD AUTO: 9.5 FL (ref 7–12)
PMV BLD AUTO: 9.5 FL (ref 7–12)
PMV BLD AUTO: 9.8 FL (ref 7–12)
PO2: 130.4 MMHG (ref 75–100)
PO2: 88.5 MMHG (ref 75–100)
POTASSIUM SERPL-SCNC: 4.3 MMOL/L (ref 3.5–5)
PS: 8 CMH20
RBC # BLD: 4.12 E12/L (ref 3.5–5.5)
RBC # BLD: 4.28 E12/L (ref 3.5–5.5)
RBC # BLD: 4.3 E12/L (ref 3.5–5.5)
RBC # BLD: 4.86 E12/L (ref 3.5–5.5)
RI(T): 1.68
RI(T): 2.94
RR MECHANICAL: 16 B/MIN
SEDIMENTATION RATE, ERYTHROCYTE: 24 MM/HR (ref 0–20)
SODIUM BLD-SCNC: 139 MMOL/L (ref 132–146)
SODIUM URINE: <20 MMOL/L
SOURCE, BLOOD GAS: ABNORMAL
SOURCE, BLOOD GAS: ABNORMAL
THB: 11.5 G/DL (ref 11.5–16.5)
THB: 12.9 G/DL (ref 11.5–16.5)
TIME ANALYZED: 1653
TIME ANALYZED: 440
TOTAL PROTEIN: 5.8 G/DL (ref 6.4–8.3)
VT MECHANICAL: 350 ML
WBC # BLD: 10.9 E9/L (ref 4.5–11.5)
WBC # BLD: 11.6 E9/L (ref 4.5–11.5)
WBC # BLD: 12.2 E9/L (ref 4.5–11.5)
WBC # BLD: 12.2 E9/L (ref 4.5–11.5)

## 2022-02-20 PROCEDURE — 2580000003 HC RX 258: Performed by: STUDENT IN AN ORGANIZED HEALTH CARE EDUCATION/TRAINING PROGRAM

## 2022-02-20 PROCEDURE — 87081 CULTURE SCREEN ONLY: CPT

## 2022-02-20 PROCEDURE — 85300 ANTITHROMBIN III ACTIVITY: CPT

## 2022-02-20 PROCEDURE — 85730 THROMBOPLASTIN TIME PARTIAL: CPT

## 2022-02-20 PROCEDURE — 93306 TTE W/DOPPLER COMPLETE: CPT

## 2022-02-20 PROCEDURE — 83735 ASSAY OF MAGNESIUM: CPT

## 2022-02-20 PROCEDURE — 2580000003 HC RX 258: Performed by: SURGERY

## 2022-02-20 PROCEDURE — 36592 COLLECT BLOOD FROM PICC: CPT

## 2022-02-20 PROCEDURE — 85246 CLOT FACTOR VIII VW ANTIGEN: CPT

## 2022-02-20 PROCEDURE — 85651 RBC SED RATE NONAUTOMATED: CPT

## 2022-02-20 PROCEDURE — 85303 CLOT INHIBIT PROT C ACTIVITY: CPT

## 2022-02-20 PROCEDURE — 86146 BETA-2 GLYCOPROTEIN ANTIBODY: CPT

## 2022-02-20 PROCEDURE — 6360000002 HC RX W HCPCS: Performed by: STUDENT IN AN ORGANIZED HEALTH CARE EDUCATION/TRAINING PROGRAM

## 2022-02-20 PROCEDURE — 81240 F2 GENE: CPT

## 2022-02-20 PROCEDURE — 82570 ASSAY OF URINE CREATININE: CPT

## 2022-02-20 PROCEDURE — 94003 VENT MGMT INPAT SUBQ DAY: CPT

## 2022-02-20 PROCEDURE — 83090 ASSAY OF HOMOCYSTEINE: CPT

## 2022-02-20 PROCEDURE — 86038 ANTINUCLEAR ANTIBODIES: CPT

## 2022-02-20 PROCEDURE — 84300 ASSAY OF URINE SODIUM: CPT

## 2022-02-20 PROCEDURE — 82962 GLUCOSE BLOOD TEST: CPT

## 2022-02-20 PROCEDURE — 84100 ASSAY OF PHOSPHORUS: CPT

## 2022-02-20 PROCEDURE — 99223 1ST HOSP IP/OBS HIGH 75: CPT | Performed by: INTERNAL MEDICINE

## 2022-02-20 PROCEDURE — 81400 MOPATH PROCEDURE LEVEL 1: CPT

## 2022-02-20 PROCEDURE — 99291 CRITICAL CARE FIRST HOUR: CPT | Performed by: SURGERY

## 2022-02-20 PROCEDURE — 2000000000 HC ICU R&B

## 2022-02-20 PROCEDURE — 85378 FIBRIN DEGRADE SEMIQUANT: CPT

## 2022-02-20 PROCEDURE — 71045 X-RAY EXAM CHEST 1 VIEW: CPT

## 2022-02-20 PROCEDURE — 6370000000 HC RX 637 (ALT 250 FOR IP): Performed by: STUDENT IN AN ORGANIZED HEALTH CARE EDUCATION/TRAINING PROGRAM

## 2022-02-20 PROCEDURE — 80053 COMPREHEN METABOLIC PANEL: CPT

## 2022-02-20 PROCEDURE — 82330 ASSAY OF CALCIUM: CPT

## 2022-02-20 PROCEDURE — 85025 COMPLETE CBC W/AUTO DIFF WBC: CPT

## 2022-02-20 PROCEDURE — 82805 BLOOD GASES W/O2 SATURATION: CPT

## 2022-02-20 PROCEDURE — 85027 COMPLETE CBC AUTOMATED: CPT

## 2022-02-20 PROCEDURE — 85306 CLOT INHIBIT PROT S FREE: CPT

## 2022-02-20 PROCEDURE — 5A1945Z RESPIRATORY VENTILATION, 24-96 CONSECUTIVE HOURS: ICD-10-PCS | Performed by: SURGERY

## 2022-02-20 PROCEDURE — 86147 CARDIOLIPIN ANTIBODY EA IG: CPT

## 2022-02-20 PROCEDURE — 6360000002 HC RX W HCPCS: Performed by: EMERGENCY MEDICINE

## 2022-02-20 PROCEDURE — 83605 ASSAY OF LACTIC ACID: CPT

## 2022-02-20 PROCEDURE — 0BH18EZ INSERTION OF ENDOTRACHEAL AIRWAY INTO TRACHEA, VIA NATURAL OR ARTIFICIAL OPENING ENDOSCOPIC: ICD-10-PCS | Performed by: SURGERY

## 2022-02-20 PROCEDURE — 85613 RUSSELL VIPER VENOM DILUTED: CPT

## 2022-02-20 PROCEDURE — 86140 C-REACTIVE PROTEIN: CPT

## 2022-02-20 PROCEDURE — 36415 COLL VENOUS BLD VENIPUNCTURE: CPT

## 2022-02-20 PROCEDURE — 81241 F5 GENE: CPT

## 2022-02-20 PROCEDURE — 81291 MTHFR GENE: CPT

## 2022-02-20 RX ORDER — SODIUM CHLORIDE, SODIUM LACTATE, POTASSIUM CHLORIDE, AND CALCIUM CHLORIDE .6; .31; .03; .02 G/100ML; G/100ML; G/100ML; G/100ML
1000 INJECTION, SOLUTION INTRAVENOUS ONCE
Status: COMPLETED | OUTPATIENT
Start: 2022-02-20 | End: 2022-02-20

## 2022-02-20 RX ORDER — SODIUM CHLORIDE, SODIUM LACTATE, POTASSIUM CHLORIDE, AND CALCIUM CHLORIDE .6; .31; .03; .02 G/100ML; G/100ML; G/100ML; G/100ML
500 INJECTION, SOLUTION INTRAVENOUS ONCE
Status: COMPLETED | OUTPATIENT
Start: 2022-02-20 | End: 2022-02-20

## 2022-02-20 RX ORDER — DULOXETIN HYDROCHLORIDE 20 MG/1
20 CAPSULE, DELAYED RELEASE ORAL DAILY
Status: DISCONTINUED | OUTPATIENT
Start: 2022-02-20 | End: 2022-02-27

## 2022-02-20 RX ORDER — DEXTROSE MONOHYDRATE 25 G/50ML
12.5 INJECTION, SOLUTION INTRAVENOUS PRN
Status: DISCONTINUED | OUTPATIENT
Start: 2022-02-20 | End: 2022-02-28 | Stop reason: HOSPADM

## 2022-02-20 RX ORDER — ATORVASTATIN CALCIUM 20 MG/1
20 TABLET, FILM COATED ORAL DAILY
Status: DISCONTINUED | OUTPATIENT
Start: 2022-02-20 | End: 2022-02-24

## 2022-02-20 RX ORDER — DEXTROSE MONOHYDRATE 50 MG/ML
100 INJECTION, SOLUTION INTRAVENOUS PRN
Status: DISCONTINUED | OUTPATIENT
Start: 2022-02-20 | End: 2022-02-28 | Stop reason: HOSPADM

## 2022-02-20 RX ORDER — NICOTINE POLACRILEX 4 MG
15 LOZENGE BUCCAL PRN
Status: DISCONTINUED | OUTPATIENT
Start: 2022-02-20 | End: 2022-02-28 | Stop reason: HOSPADM

## 2022-02-20 RX ADMIN — INSULIN LISPRO 2 UNITS: 100 INJECTION, SOLUTION INTRAVENOUS; SUBCUTANEOUS at 09:27

## 2022-02-20 RX ADMIN — INSULIN LISPRO 2 UNITS: 100 INJECTION, SOLUTION INTRAVENOUS; SUBCUTANEOUS at 04:40

## 2022-02-20 RX ADMIN — PROPOFOL 20 MCG/KG/MIN: 10 INJECTION, EMULSION INTRAVENOUS at 11:16

## 2022-02-20 RX ADMIN — SODIUM CHLORIDE, POTASSIUM CHLORIDE, SODIUM LACTATE AND CALCIUM CHLORIDE: 600; 310; 30; 20 INJECTION, SOLUTION INTRAVENOUS at 22:09

## 2022-02-20 RX ADMIN — Medication 20 MG: at 01:04

## 2022-02-20 RX ADMIN — SODIUM CHLORIDE, POTASSIUM CHLORIDE, SODIUM LACTATE AND CALCIUM CHLORIDE: 600; 310; 30; 20 INJECTION, SOLUTION INTRAVENOUS at 07:49

## 2022-02-20 RX ADMIN — SODIUM CHLORIDE, POTASSIUM CHLORIDE, SODIUM LACTATE AND CALCIUM CHLORIDE 500 ML: 600; 310; 30; 20 INJECTION, SOLUTION INTRAVENOUS at 12:43

## 2022-02-20 RX ADMIN — INSULIN LISPRO 6 UNITS: 100 INJECTION, SOLUTION INTRAVENOUS; SUBCUTANEOUS at 01:26

## 2022-02-20 RX ADMIN — Medication 10 ML: at 10:17

## 2022-02-20 RX ADMIN — ACETAMINOPHEN ORAL SOLUTION 650 MG: 650 SOLUTION ORAL at 20:56

## 2022-02-20 RX ADMIN — Medication 10 ML: at 20:57

## 2022-02-20 RX ADMIN — ACETAMINOPHEN ORAL SOLUTION 650 MG: 650 SOLUTION ORAL at 12:10

## 2022-02-20 RX ADMIN — CHLORHEXIDINE GLUCONATE 15 ML: 1.2 RINSE ORAL at 00:14

## 2022-02-20 RX ADMIN — Medication 20 MG: at 09:33

## 2022-02-20 RX ADMIN — SODIUM CHLORIDE, POTASSIUM CHLORIDE, SODIUM LACTATE AND CALCIUM CHLORIDE 1000 ML: 600; 310; 30; 20 INJECTION, SOLUTION INTRAVENOUS at 11:16

## 2022-02-20 RX ADMIN — CHLORHEXIDINE GLUCONATE 15 ML: 1.2 RINSE ORAL at 20:56

## 2022-02-20 RX ADMIN — SODIUM CHLORIDE, POTASSIUM CHLORIDE, SODIUM LACTATE AND CALCIUM CHLORIDE 1000 ML: 600; 310; 30; 20 INJECTION, SOLUTION INTRAVENOUS at 05:06

## 2022-02-20 RX ADMIN — HEPARIN SODIUM 13 UNITS/KG/HR: 10000 INJECTION, SOLUTION INTRAVENOUS at 10:48

## 2022-02-20 RX ADMIN — CHLORHEXIDINE GLUCONATE 15 ML: 1.2 RINSE ORAL at 09:32

## 2022-02-20 RX ADMIN — Medication 20 MG: at 20:56

## 2022-02-20 RX ADMIN — PROPOFOL 30 MCG/KG/MIN: 10 INJECTION, EMULSION INTRAVENOUS at 03:55

## 2022-02-20 ASSESSMENT — PULMONARY FUNCTION TESTS
PIF_VALUE: 22
PIF_VALUE: 22
PIF_VALUE: 21
PIF_VALUE: 20
PIF_VALUE: 16
PIF_VALUE: 22
PIF_VALUE: 22
PIF_VALUE: 17
PIF_VALUE: 23
PIF_VALUE: 24
PIF_VALUE: 16
PIF_VALUE: 21
PIF_VALUE: 17
PIF_VALUE: 17
PIF_VALUE: 21
PIF_VALUE: 22
PIF_VALUE: 25
PIF_VALUE: 21
PIF_VALUE: 17
PIF_VALUE: 17
PIF_VALUE: 21
PIF_VALUE: 23
PIF_VALUE: 21
PIF_VALUE: 17
PIF_VALUE: 20
PIF_VALUE: 20
PIF_VALUE: 23
PIF_VALUE: 16

## 2022-02-20 ASSESSMENT — PAIN SCALES - GENERAL: PAINLEVEL_OUTOF10: 6

## 2022-02-20 NOTE — PROGRESS NOTES
Selena served Dr. Sreekanth Humphreys regarding Milrinone titration and BP parameters decision to stop milrinone was made and will continue to assess.

## 2022-02-20 NOTE — ANESTHESIA PROCEDURE NOTES
Central Venous Line:    A central venous line was placed using ultrasound guidance, in the OR for the following indication(s): central venous access and CVP monitoring. Sterility preparation included the following: hand hygiene performed prior to procedure, maximum sterile barriers used and sterile technique used to drape from head to toe. The patient was placed in supine position. The right internal jugular vein was prepped. The site was prepped with Chloraprep. A 7.5 Fr (size), 20 (length), triple lumen was placed. During the procedure, the following specific steps were taken: target vein identified, needle advanced into vein and blood aspirated and guidewire advanced into vein. Intravenous verification was obtained by ultrasound and venous blood return. Post insertion care included: all ports aspirated, all ports flushed easily, guidewire removed intact, Biopatch applied, line sutured in place and dressing applied. During the procedure the patient experienced: patient tolerated procedure well with no complications and EBL < 5mL.       Insertion site scrubbed per usage guidelines?: Yes  Skin prep agent dried for 3 minutes prior to procedure?:yes  Anesthesia type: general..No  Staffing  Performed: Anesthesiologist   Anesthesiologist: Leonarda Zaman MD  Preanesthetic Checklist  Completed: patient identified, IV checked, site marked, risks and benefits discussed, surgical consent, monitors and equipment checked, pre-op evaluation, timeout performed, anesthesia consent given, oxygen available and patient being monitored

## 2022-02-20 NOTE — OP NOTE
Operative Note      Patient: Willy Harmon  YOB: 1940  MRN: [id number]    Date of Procedure: 2/19/2022    Pre-Op Diagnosis: Submassive bilateral pulmonary emboli, superior mesenteric artery occlusion, left subclavian artery occlusion. Post-Op Diagnosis: Same       Procedure(s):  VENA CAVA FILTER INSERTION  EXPLORATORY LAPAROTOMY, MESENTERIC ARTERY EMBOLECTOMY  LEFT ARM  EMBOLECTOMY    Surgeon(s):  Caryn Alexander MD    Assistant:   Resident: Brandt Bumpers, MD    Anesthesia: General    Estimated Blood Loss (mL): 241     Complications: None    Specimens:   ID Type Source Tests Collected by Time Destination   A : MESENTERIC ARTERY CLOT Tissue Tissue SURGICAL PATHOLOGY Caryn Alexander MD 2/19/2022 2131        Implants:  Implant Name Type Inv. Item Serial No.  Lot No. LRB No. Used Action   FILTER VASC L50MM [id number] INTRO 7FR L65CM VENA CAVA FEM W - [id number] Vascular filters FILTER VASC L50MM [id number] INTRO 7FR L65CM VENA CAVA FEM [facility] [id number]  1 Implanted         Drains:   Urethral Catheter (Active)       Findings:     Detailed Description of Procedure: The patient was identified and the procedure was confirmed. The abdomen groins and thighs were prepped and draped in usual sterile fashion. A midline abdominal incision was made and carried down through the subcutaneous tissue. The fascia was divided in the midline and peritoneal cavity was entered. The bowel appeared viable without areas of focal infarction. The Omni retractor was assembled. The transverse colon was retracted superiorly. The second portion of the duodenum was mobilized medially. The root of the mesentery was palpated and was pulseless. Incision was made in the mesentery and the superior mesenteric artery branch was identified. It was followed proximally and joined with another large branch and I felt this was the main artery. The patient was continued on heparin.   The artery was clamped proximally and distally and a transverse arteriotomy was made. A #4 Michelle embolectomy catheter was passed proximally several times removing thrombus. Pulsatile inflow was restored. Clot was also removed from the superior mesenteric artery distal to the arteriotomy. The arteriotomy was then approximated with interrupted 6-0 Prolene sutures. The bowel was reinspected and the color was improved with good peristalsis and no obvious areas of infarction. The bowel contents were returned to their normal position and the fascia was approximated with looped PDS suture and the skin was closed with skin clips. A sterile dressing was applied over the incision. Next the right common femoral vein was percutaneously entered and a wire was advanced into the.  Vena cava under fluoroscopic guidance. Incision was made around the wire. The dilator was passed followed by the filter introducer. The filter was advanced through the introducer and positioned and deployed at the level of the third lumbar vertebra. The filter deployed properly. A suture was placed around the exit site and the sheath was removed. A sterile dressing was applied over the puncture site. The drapes were removed and the left arm was then prepped and draped in usual sterile fashion. A longitudinal skin incision was made in the medial aspect of the distal upper arm and carried down through the subcutaneous tissue. The brachial artery was identified and freed from the surrounding tissues and surrounded proximally and distally with Vesseloops for control. The artery was clamped and a transverse arteriotomy was made. A #4 Michelle embolectomy catheter was passed proximally several times removing thrombus and brisk inflow was restored. No thrombus was removed distally. The arteriotomy was approximated with interrupted 6-0 Prolene sutures.   The clamps were released and flow was restored and a radial pulses appreciated at the wrist.  The skin incision was approximated with multiple layers of Vicryl suture. Dermabond was applied over the skin incision. Needle sponge and instrument counts reported correct x2. The patient tolerated the procedure and was transferred to the CVICU in critical but stable condition.     Electronically signed by Nathanael Mckeon MD on 2/19/2022 at 9:58 PM

## 2022-02-20 NOTE — PROGRESS NOTES
VASCULAR SURGERY  DAILY PROGRESS NOTE    Date:2022       [facility]:[id number]  Patient Anita Luna     YOB: 1940     Age:81 y.o. Chief Complaint:  Chief Complaint   Patient presents with    Numbness     L arm numbness, started at 1030 today, -thinners, bgl 219 per ems        Subjective:  Patient had surgery late last night. No concerns with peripheral signals overnight    Objective:  /66   Pulse 102   Temp 98.7 °F (37.1 °C) (Axillary)   Resp 19   Ht 5' 1\" (1.549 m)   Wt 191 lb 12.8 oz (87 kg)   SpO2 99%   BMI 36.24 kg/m²   Temp (24hrs), Av.9 °F (36.6 °C), Min:95.5 °F (35.3 °C), Max:99.1 °F (37.3 °C)      I/O (24Hr):  I/O last 3 completed shifts: In: 1551.7 [I.V.:1551.7]  Out: 9534 [Urine:1602]     GENERAL:  No acute distress. Sedated on the ventilator  LUNGS:  No cough. Nonlabored breathing on vent  CARDIOVASC:  Tachy rate, warm. ABDOMEN:  Soft, non-distended, non-tender. EXTREMITIES: Left arm incision clean dry and intact. Palpable radial and ulnar pulses. Right DP biphasic signal.  Right PT and left DP PT triphasic signal.    Assessment:  78 y.o. female with DVT, PE, and emboli to the left subclavian artery and superior mesenteric artery    Plan:  -Continue heparin drip  -Okay to extubate today from vascular standpoint  -Critical care is considering echocardiogram and hypercoagulability work-up for patient's paradoxical emboli    Electronically signed by Shyla Plaza MD on 2022 at 9:40 AM     Agree. Overall doing quite well considering extensive thromboemboli. Continue heparin.

## 2022-02-20 NOTE — H&P
PRN Mario Aldrich DO        heparin 25,000 units in dextrose 5% 250 mL (premix) infusion  18 Units/kg/hr IntraVENous Continuous Niru Espinal DO   Paused at 02/19/22 2354    0.9 % sodium chloride infusion   IntraVENous PRN Betsy Anderson MD        sodium chloride flush 0.9 % injection 5-40 mL  5-40 mL IntraVENous 2 times per day Santhosh Baker MD   10 mL at 02/19/22 2353    sodium chloride flush 0.9 % injection 5-40 mL  5-40 mL IntraVENous PRN Santhosh Baker MD        0.9 % sodium chloride infusion  25 mL IntraVENous PRN Santhosh Baker MD        perflutren lipid microspheres (DEFINITY) injection 1.65 mg  1.5 mL IntraVENous ONCE PRN Santhosh Baker MD        lactated ringers infusion   IntraVENous Continuous Santhosh Baker  mL/hr at 02/19/22 2353 New Bag at 02/19/22 2353    acetaminophen (TYLENOL) 160 MG/5ML solution 650 mg  650 mg Oral Q4H PRN Santhosh Baker MD        fentaNYL 5 mcg/ml in 0.9%  ml infusion   mcg/hr IntraVENous Continuous Santhosh Baker MD 5 mL/hr at 02/19/22 2336 25 mcg/hr at 02/19/22 2336    propofol injection  5-50 mcg/kg/min IntraVENous Titrated Santhosh Baker MD 10.4 mL/hr at 02/19/22 2230 20 mcg/kg/min at 02/19/22 2230    milrinone (PRIMACOR) 20 mg in dextrose 5 % 100 mL infusion  0.0625-0.75 mcg/kg/min IntraVENous Continuous Santhosh Baker MD 5.2 mL/hr at 02/19/22 2333 0.2 mcg/kg/min at 02/19/22 2333    polyvinyl alcohol (LIQUIFILM TEARS) 1.4 % ophthalmic solution 1 drop  1 drop Both Eyes PRN Santhosh Baker MD        And    lubrifresh P.M. (artificial tears) ophthalmic ointment   Both Eyes PRN Santhosh Baker MD        chlorhexidine (PERIDEX) 0.12 % solution 15 mL  15 mL Mouth/Throat BID Santhosh Baker MD        famotidine (PEPCID) suspension 20 mg  20 mg Per NG tube BID Santhosh Baker MD           Social History     Tobacco Use    Smoking status: Never Smoker    Smokeless tobacco: Never Used   Substance Use Topics    Alcohol use: No     Alcohol/week: 0.0 standard drinks          Review of Systems:  Unobtainable - intubated    Physical Exam:   General appearance: intubated, sedated, follows commands  Eyes: PERRL  Lungs: normal chest wall mechanics, on ventilator  Heart: regular rate and rhythm  Abdomen: soft, nondistneded, midline incision dressing clean  Skin: right groin dressing clean no hematoma, left arm incisions glued  : villarreal in place light yellow urine  Musculoskeletal: bilateral lower extremity edema, symmetric, heena around ankles. Feet warm. Groin incisional nonbloody  Vascular: L radial/ulnar 2+. RLE difficult to doppler DP, triphasic 1+ PT. LLE 2+ DP, triphasic 1+ PT. Recent Labs     02/19/22  2300 02/19/22  1239   WBC 15.6* 9.1   HGB 12.6 13.8   HCT 39.9 44.2   MCV 77.5* 77.4*    225     Lab Results   Component Value Date     02/19/2022    K 4.0 02/19/2022     02/19/2022    CO2 20 (L) 02/19/2022    BUN 13 02/19/2022    CREATININE 0.7 02/19/2022    GLUCOSE 252 (H) 02/19/2022    CALCIUM 8.1 (L) 02/19/2022    PROT 6.0 (L) 02/19/2022    LABALBU 3.3 (L) 02/19/2022    BILITOT 0.4 02/19/2022    ALKPHOS 67 02/19/2022    AST 25 02/19/2022    ALT 38 (H) 02/19/2022    LABGLOM >60 02/19/2022    GFRAA >60 02/19/2022       Lab Results   Component Value Date    LACTA 3.2 (H) 02/19/2022       Lab Results   Component Value Date    INR 1.1 02/19/2022    PROTIME 12.1 02/19/2022       Imaging:  CT HEAD WO CONTRAST    Result Date: 2/19/2022  EXAMINATION: CT OF THE HEAD WITHOUT CONTRAST  2/19/2022 12:40 pm TECHNIQUE: CT of the head was performed without the administration of intravenous contrast. Dose modulation, iterative reconstruction, and/or weight based adjustment of the mA/kV was utilized to reduce the radiation dose to as low as reasonably achievable. COMPARISON: None. HISTORY: ORDERING SYSTEM PROVIDED HISTORY: stroke TECHNOLOGIST PROVIDED HISTORY: Has a \"code stroke\" or \"stroke alert\" been called? ->Yes Reason for exam:->stroke Decision Support Exception - unselect if not a suspected or confirmed emergency medical condition->Emergency Medical Condition (MA) What reading provider will be dictating this exam?->CRC FINDINGS: BRAIN/VENTRICLES: Slight motion artifact. Mild age related atrophy. There is an old lacunar type infarction in the right basal ganglia. Periventricular low density is noted most prominent in the frontal regions in the right frontal lobe centrally. This may reflect chronic small vessel ischemia. No large territory infarct detected. No significant loss of gray-white matter junction. No intracranial hemorrhage. Post atrophic enlargement of the lateral ventricles is present. 4th ventricle is patent. No evidence of hyperdense MCA sign to suggest acute thrombosis. ORBITS: The visualized portion of the orbits demonstrate no acute abnormality. SINUSES: The visualized paranasal sinuses and mastoid air cells demonstrate no acute abnormality. SOFT TISSUES/SKULL:  No acute abnormality of the visualized skull or soft tissues. Mild hyperostosis frontalis interna. Atrophy and age-related changes. Old lacunar infarct in the right basal ganglia. No acute intracranial hemorrhage. No large territory infarct. Critical results were called by Dr. Jack Mcmillan MD to Jon Hoyos on 2/19/2022 at 13:12. XR CHEST PORTABLE    Result Date: 2/19/2022  EXAMINATION: ONE XRAY VIEW OF THE CHEST; ONE SUPINE XRAY VIEW(S) OF THE ABDOMEN 2/19/2022 11:05 pm COMPARISON: Chest series from the same day HISTORY: ORDERING SYSTEM PROVIDED HISTORY: intubation, line placement TECHNOLOGIST PROVIDED HISTORY: Reason for exam:->intubation, line placement What reading provider will be dictating this exam?->CRC FINDINGS: CHEST: Endotracheal tube terminates in the distal thoracic trachea 2-3 cm above the krista. Right internal jugular central venous catheter with distal tip projecting in the mid to distal superior vena cava. Low lung volumes.   Retrocardiac and bibasilar opacities similar to prior exam.  No pleural effusion or pneumothorax. Atherosclerotic disease and cardiomegaly. Osseous and thoracic soft tissue structures demonstrate no acute findings. UPPER ABDOMEN: Surgical staples overlie the abdomen. Imaged bowel gas pattern is unremarkable. Enteric tube extends subdiaphragmatic with distal tip and side port projecting over the left upper quadrant. 1.  Endotracheal tube terminates in the mid to distal thoracic trachea. Right internal jugular central venous catheter. Satisfactory position of the enteric tube. 2.  Atherosclerotic disease and cardiomegaly. Stable distribution and appearance of mild retrocardiac and bibasilar opacities. XR CHEST PORTABLE    Result Date: 2/19/2022  EXAMINATION: ONE X-RAY VIEW OF THE CHEST 2/19/2022 2:44 pm COMPARISON: None HISTORY: ORDERING SYSTEM PROVIDED HISTORY:  Weakness, Possible Stroke TECHNOLOGIST PROVIDED HISTORY: Reason for Exam:  Weakness, Possible Stroke What reading provider will be dictating this exam?  CRC FINDINGS: Portable chest reveals heart to be borderline enlarged. Patchy airspace disease seen diffusely throughout the left lung and in the periphery of the right mid and lower lung field concerning for pneumonia. Lung volumes are low. Degenerative changes seen within the spine. Pulmonary vascularity is within normal limits. Pneumonia seen within the left lung and right mid and lower lung in the periphery. CTA CHEST W CONTRAST    Result Date: 2/19/2022  EXAMINATION: CTA OF THE CHEST 2/19/2022 1:23 pm TECHNIQUE: CTA of the chest was performed after the administration of intravenous contrast.  Multiplanar reformatted images are provided for review. MIP images are provided for review. Dose modulation, iterative reconstruction, and/or weight based adjustment of the mA/kV was utilized to reduce the radiation dose to as low as reasonably achievable. COMPARISON: None.  HISTORY: ORDERING SYSTEM PROVIDED HISTORY: r/o dissection TECHNOLOGIST PROVIDED HISTORY: Reason for exam:->r/o dissection Decision Support Exception - unselect if not a suspected or confirmed emergency medical condition->Emergency Medical Condition (MA) What reading provider will be dictating this exam?->CRC FINDINGS: Pulmonary Arteries: The pulmonary arteries are well opacified for evaluation. There is a large, central pulmonary embolism involving the right main pulmonary artery. Numerous additional pulmonary emboli are present among 1st, 2nd and 3rd order branches bilaterally. Thrombus does extend into the left main pulmonary artery peripherally. There are findings also suggestive of right heart strain by CT criteria. Mediastinum: Heart size is within normal limits. Coronary artery calcifications are noted, potentially a marker for coronary artery disease. Trace pericardial fluid is present. The thoracic esophagus is decompressed, limiting assessment. No obvious esophageal abnormality. Visible but nonenlarged mediastinal lymph nodes are present. Lungs/pleura: The central airways are patent. No pleural effusion or pneumothorax. Mild dependent atelectasis is noted at both lung bases. Upper Abdomen: There is thrombus within the lumen of the abdominal aorta at the level of the superior mesenteric artery. Clot extends from the abdominal aortic lumen into the superior mesenteric artery (or vice versa). Thrombus involving the main trunk of the superior mesenteric artery appears to be nearly completely occlusive. There is at least some flow within the superior mesenteric artery branches distal to the thrombus. There is an indeterminate hypodense mass within the inferior aspect of the right hepatic lobe posteriorly on axial image 135, measuring 6 cm. This may be indicative of a hepatic cyst although the density is slightly higher than would be expected for simple cyst. Soft Tissues/Bones: No acute bone abnormality.   Incidental note is made of complete occlusion of the left subclavian artery. Please refer to the dedicated CT of the left upper extremity for additional details. Extensive bilateral pulmonary emboli most pronounced in the right main pulmonary artery where there is a large volume of thrombus. There is evidence of right heart strain by CT criteria. Incidentally noted acute thrombus within the superior mesenteric artery as well as the abdominal aorta extending from the level of the superior mesenteric ostium inferiorly into the mid abdominal aorta. Vascular surgery consultation is recommended. Dedicated CTA of the abdomen/pelvis should be considered as well. 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 No contrast is visualized within the imaged arterial structures of the left upper extremity. Please refer to the dedicated CTA of the left upper extremity for additional details. 6 cm low-density lesion within the inferior aspect of the right hepatic lobe with Hounsfield units slightly higher than that. After acute treatment, recommend correlation with right upper quadrant ultrasound would be expected for a simple cyst to ensure that this is a simple cyst. Additional, incidental findings as above. Critical results were called by Dr. Brandon Pantoja MD to Dr. Bev Donald on 2022 at 14:26. CTA NECK W CONTRAST    Result Date: 2022  Patient MRN:  [id number] : 1940 Age: 78 years Gender: Female Order Date:  2022 12:39 PM EXAM: CTA NECK W CONTRAST, CTA HEAD W CONTRAST NUMBER OF IMAGES:  80 INDICATION:  stroke stroke Decision Support Exception - unselect if not a suspected or confirmed emergency medical condition->Emergency Medical Condition (MA) COMPARISON: None Technique: Low-dose CT  acquisition technique included one of following options; 1 . Automated exposure control, 2. Adjustment of MA and or KV according to patient's size or 3. Use of iterative reconstruction.  Contiguous spiral images were obtained in the axial plane, following the administration of intravenous contrast using CT angiographic protocol. Sagittal and coronal images were reconstructed from the axial plane acquisition. Additional MIP reconstructions were presented to aid in the interpretation of this study. Images were obtained from the skull base cranially. There is moderate calcified plaque identified in the vessels compatible with atherosclerotic disease. There is aortic arch and great vessels demonstrate moderate atherosclerotic disease. The right carotid is abnormal. There is no evidence for hemodynamically significant stenosis at the level the proximal internal carotid artery. By NASCET criteria estimated stenosis is 50% or less  There is evidence for atherosclerotic disease of the distal internal carotid artery The left carotid is abnormal. There is no evidence for hemodynamically significant stenosis at the level the proximal internal carotid artery. By NASCET criteria estimated stenosis is 50% or less There is evidence for atherosclerotic disease of the distal internal carotid artery The right vertebral artery is unremarkable The left vertebral artery is unremarkable The basilar artery is unremarkable The middle cerebral arteries are unremarkable The anterior cerebral arteries are unremarkable The posterior cerebral arteries are unremarkable There appear to be bilateral pulmonary emboli. The left subclavian artery is occluded. The appearance would suggest thrombosis. This is just distal to the vertebral artery There is a suggestion of a right hilar mass. The chest and hilum is incompletely evaluated on this study    1. Estimated stenosis of the proximal right and left internal carotid artery by NASCET criteria is not hemodynamically significant 2. Moderate atherosclerotic disease . 3. No large vessel occlusion identified 4. Findings consistent with bilateral pulmonary emboli 5.  Findings suspicious for right hilar mass 6. Left subclavian thrombosis, without convincing atherosclerotic disease this would suggest emboli This study was analyzed by the [address] ai algorithm. CT BRAIN PERFUSION    Result Date: 2022  Patient MRN: [id number] : 1940 Age:  78 years Gender: Female Order Date: 2022 12:39 PM Exam: CT BRAIN PERFUSION Number of Images: 333 views Indication:   stroke stroke Decision Support Exception - unselect if not a suspected or confirmed emergency medical condition->Emergency Medical Condition (MA) What reading provider will be dictating this exam?->[facility] Comparison: None. Findings: Perfusion images demonstrate symmetric blood volume Blood flow images demonstrate symmetric blood flow There is no significant ischemic penumbra identified. There is no significant core infarct identified. No significant ischemic penumbra identified This study was analyzed by the [employer] ai algorithm. XR ABDOMEN FOR NG/OG/NE TUBE PLACEMENT    Result Date: 2022  EXAMINATION: ONE XRAY VIEW OF THE CHEST; ONE SUPINE XRAY VIEW(S) OF THE ABDOMEN 2022 11:05 pm COMPARISON: Chest series from the same day HISTORY: ORDERING SYSTEM PROVIDED HISTORY: intubation, line placement TECHNOLOGIST PROVIDED HISTORY: Reason for exam:->intubation, line placement What reading provider will be dictating this exam?->CRC FINDINGS: CHEST: Endotracheal tube terminates in the distal thoracic trachea 2-3 cm above the krista. Right internal jugular central venous catheter with distal tip projecting in the mid to distal superior vena cava. Low lung volumes. Retrocardiac and bibasilar opacities similar to prior exam.  No pleural effusion or pneumothorax. Atherosclerotic disease and cardiomegaly. Osseous and thoracic soft tissue structures demonstrate no acute findings. UPPER ABDOMEN: Surgical staples overlie the abdomen. Imaged bowel gas pattern is unremarkable.  Enteric tube extends subdiaphragmatic with distal tip and side port projecting over the left upper quadrant. 1.  Endotracheal tube terminates in the mid to distal thoracic trachea. Right internal jugular central venous catheter. Satisfactory position of the enteric tube. 2.  Atherosclerotic disease and cardiomegaly. Stable distribution and appearance of mild retrocardiac and bibasilar opacities. CTA HEAD W CONTRAST    Result Date: 2022  Patient MRN:  [id number] : 1940 Age: 78 years Gender: Female Order Date:  2022 12:39 PM EXAM: CTA NECK W CONTRAST, CTA HEAD W CONTRAST NUMBER OF IMAGES:  80 INDICATION:  stroke stroke Decision Support Exception - unselect if not a suspected or confirmed emergency medical condition->Emergency Medical Condition (MA) COMPARISON: None Technique: Low-dose CT  acquisition technique included one of following options; 1 . Automated exposure control, 2. Adjustment of MA and or KV according to patient's size or 3. Use of iterative reconstruction. Contiguous spiral images were obtained in the axial plane, following the administration of intravenous contrast using CT angiographic protocol. Sagittal and coronal images were reconstructed from the axial plane acquisition. Additional MIP reconstructions were presented to aid in the interpretation of this study. Images were obtained from the skull base cranially. There is moderate calcified plaque identified in the vessels compatible with atherosclerotic disease. There is aortic arch and great vessels demonstrate moderate atherosclerotic disease. The right carotid is abnormal. There is no evidence for hemodynamically significant stenosis at the level the proximal internal carotid artery. By NASCET criteria estimated stenosis is 50% or less  There is evidence for atherosclerotic disease of the distal internal carotid artery The left carotid is abnormal. There is no evidence for hemodynamically significant stenosis at the level the proximal internal carotid artery.  By NASCET criteria estimated stenosis is 50% or less There is evidence for atherosclerotic disease of the distal internal carotid artery The right vertebral artery is unremarkable The left vertebral artery is unremarkable The basilar artery is unremarkable The middle cerebral arteries are unremarkable The anterior cerebral arteries are unremarkable The posterior cerebral arteries are unremarkable There appear to be bilateral pulmonary emboli. The left subclavian artery is occluded. The appearance would suggest thrombosis. This is just distal to the vertebral artery There is a suggestion of a right hilar mass. The chest and hilum is incompletely evaluated on this study    1. Estimated stenosis of the proximal right and left internal carotid artery by NASCET criteria is not hemodynamically significant 2. Moderate atherosclerotic disease . 3. No large vessel occlusion identified 4. Findings consistent with bilateral pulmonary emboli 5. Findings suspicious for right hilar mass 6. Left subclavian thrombosis, without convincing atherosclerotic disease this would suggest emboli This study was analyzed by the [address] ai algorithm. CTA UPPER EXTREMITY LEFT W CONTRAST    Result Date: 2/19/2022  EXAMINATION: CTA OF THE LEFT UPPER EXTREMITY WITH CONTRAST 2/19/2022 1:23 pm TECHNIQUE: CTA of the left upper extremity was performed with the administration of intravenous contrast. Multiplanar reformatted images are provided for review. MIP images are provided for review. Dose modulation, iterative reconstruction, and/or weight based adjustment of the mA/kV was utilized to reduce the radiation dose to as low as reasonably achievable. COMPARISON: None HISTORY ORDERING SYSTEM PROVIDED HISTORY: radial pulse absent TECHNOLOGIST PROVIDED HISTORY: Reason for exam:->radial pulse absent What reading provider will be dictating this exam?->CRC FINDINGS: CT angiogram of the left upper extremity: Normal contrast column is noted in the thoracic aortic arch.   Minimal atherosclerotic calcification at the origin of the left subclavian artery without evidence of high-grade stenosis. There is complete occlusion the subclavian artery as it crosses the 1st rib on the left with occlusion of the axillary and brachial artery. No arterial flow visible in the left upper arm. Incidental note is made of an apparent short segment dissection or linear thrombus involving involving the right innominate artery, small linear filling defect is noted in the proximal right internal carotid artery near its origin and also within the proximal right subclavian artery. No evidence of high-grade narrowing of the subclavian or carotid vessels. Images demonstrate a very large thrombus in the right main pulmonary artery and several small thrombi in the upper and lower lobe arteries on the left. There is evidence of right heart strain. Thrombus is seen filling the superior mesenteric artery. There is some additional linear thrombus extending into the abdominal aorta. Bones: Lower cervical spine and visualized portions of the thoracic and lumbar spine show normal alignment. No lytic or blastic lesions detected. Clavicles are intact. Scapula a appear normal in the visualized portions. AC joints are intact. Proximal humerus is normal. Soft Tissue: Subcutaneous tissues appear normal. Joint: Shoulder joints appear intact. 1.  Significant pulmonary embolism occluding the right main pulmonary artery with several emboli in the left upper and lower lobe pulmonary arteries. Evidence of right heart strain. 2.  Linear filling defect within the proximal right common carotid artery and right subclavian artery which could represent additional thrombus or very short segment of dissection. Thrombus is favored. 3.  Complete occlusion of the subclavian artery as it crosses the 1st rib with no arterial flow in the left axillary or brachial arteries.  4.  Large linear thrombus extending into the superior mesenteric artery with a portion remaining in the abdominal aorta. 2 Critical results were called by Dr. Kerri Pina MD to Georges Monge on 2/19/2022 at 14:26. Assessment   Principal Problem:    Acute mesenteric arterial occlusion (HCC)  Active Problems:    Acute saddle pulmonary embolism with acute cor pulmonale (HCC)    Occlusion of left subclavian artery    Limb ischemia    Multiple pulmonary emboli (HCC)  Resolved Problems:    * No resolved hospital problems. *      Plan    Neuro:   o prn Tylenol   Fentanyl ggt  and Propofol gtt    o Initial concern for CVA and telestroke consult - imaging negative and symptoms attributable to arm ischemia   Cardiac:   Monitor hemodynamics, has A line  o Echocardiogram as part of thromboembolic workup and evaluate function  o Wean milrinone as able  o History of hypertension, hyperlipidemia. Hold home losartan/HCTZ   Pulmonary: Bilateral massive PE with possible right heart strain, possible hilar mass on CT.  Kept intubated postop  o Daily CXR while intubated, wean to extubate as able   GI: SMA embolus s/p exploratory laparotomy 2/20  o Continue NPO with NG to suction, await return of bowel function   Renal: No acute issues, Strict IO, monitor Urine Output, Daily CBC,BMP, Mg,Phos, ionized Ca, Every 6 hour Lactate  and every 6 hour CBC    Musculoskeletal: LUE embolectomy, monitor sensation and motor function and pulse exam   ID: No Indication for Antibiotics   Heme: DVT, PE, paradoxical emboli to SMA and left subclavian artery, history of previous DVT not on anticocagulation, now s/p 2/19 SMA embolectomy, IVC filter, left arm embolectomy  o Continue heparin drip  o Will consider hematology consult for hypercoagulability workup   Endocrine: history non insulin dependent diabetes Maintain glucose <180  and SSI     DVT Prophylaxis: PCDs, therapeutic Heparin   Ulcer Prophylaxis: H2 blocker  Tubes and Lines: Central Line and Arterial Line  placed 2/19  Ancillary consults:   Internal Medicine and Vascular and Critical Care   Family Update:         As available   CODE Status:       Full Code    Dispo: CVICU    Electronically signed by Jr Ken MD on 2/20/2022 at 12:14 AM

## 2022-02-20 NOTE — ANESTHESIA POSTPROCEDURE EVALUATION
Department of Anesthesiology  Postprocedure Note    Patient: Jacob Ramirez  MRN: [id number]  YOB: 1940  Date of evaluation: 2/19/2022  Time:  10:48 PM     Procedure Summary     Date: 02/19/22 Room / Location: [facility] OR 03 / [facility]    Anesthesia Start: 1745 Anesthesia Stop: 2230    Procedures:       VENA CAVA FILTER INSERTION (Right Groin)      EXPLORATORY LAPAROTOMY, MESENTERIC ARTERY EMBOLECTOMY (N/A Abdomen)      LEFT ARM  EMBOLECTOMY (Left Arm Upper) Diagnosis: (..)    Surgeons: Faye Aguilar MD Responsible Provider: Jennie Aparicio MD    Anesthesia Type: general ASA Status: 4 - Emergent          Anesthesia Type: general    Tushar Phase I:      Tushar Phase II:      Last vitals: Reviewed and per EMR flowsheets.        Anesthesia Post Evaluation    Patient location during evaluation: ICU  Patient participation: complete - patient cannot participate  Level of consciousness: sedated and ventilated  Airway patency: patent  Nausea & Vomiting: no nausea  Complications: no  Cardiovascular status: vasoactive/inotropes  Respiratory status: ventilator  Hydration status: euvolemic

## 2022-02-20 NOTE — PROGRESS NOTES
[facility]  PROGRESS NOTE  ATTENDING NOTE    CRITICAL CARE    Chief Complaint   Patient presents with    Numbness     L arm numbness, started at 1030 today, -thinners, bgl 219 per ems       HPI  This is a 78 y. o. female with PMH HTN, DM, lymphedema, multiple prior DVT who was treated with short term anticoagulation, not currently taking, who presented to the ED after she had acute onset numbness, tingling and loss of strength in the left arm this morning. Patient's daughter brought her to the emergency room for further evaluation. It has since regained strength, but she is still having numbness and tingling. She was initially worked up for a potentia. stroke but then on imaging was actually found to have bilateral massive PE, left subclavian artery thrombus, and SMA thrombus. She had no abdominal pain. She was started on a heparin drip and then taken to OR by Vascular for SMA embolectomy, IVC filter, and left arm embolectomy. She was kept intubated postop.     Of note patient and family state that for the past month, patient has been increasingly sedentary due to persistent back pain. She has also had a recent ultrasound of her right leg at [facility] due to some pain and concern for DVT, which was negative.     Patient Active Problem List   Diagnosis    Essential hypertension, benign    Lymphedema    Osteoarthritis    Hyperlipidemia    Anxiety    Acute mesenteric arterial occlusion ([address]    Acute saddle pulmonary embolism with acute cor pulmonale (HCC)    Occlusion of left subclavian artery    Limb ischemia    Multiple pulmonary emboli (HCC)       OVERNIGHT EVENTS:  SMA embolectomy, IVC filter, brachial artery exploration with axillary artery embolectomy    HOSPITAL COURSE:  2/19  SMA embolectomy, IVC filter, brachial artery exploration with axillary artery embolectomy, heparin gtt  2/20  Thrombophilia panel sent; hematology c/s; echo    /66   Pulse 103   Temp 98.7 °F (37.1 °C) (Axillary)   Resp 20   Ht 5' 1\" (1.549 m)   Wt 191 lb 12.8 oz (87 kg)   SpO2 98%   BMI 36.24 kg/m²   Physical Exam  Constitutional:       Appearance: She is obese. HENT:      Head: Normocephalic and atraumatic. Nose: Nose normal.      Mouth/Throat:      Mouth: Mucous membranes are moist.      Pharynx: Oropharynx is clear. Eyes:      Extraocular Movements: Extraocular movements intact. Pupils: Pupils are equal, round, and reactive to light. Cardiovascular:      Rate and Rhythm: Normal rate and regular rhythm. Pulses: Normal pulses. Heart sounds: Normal heart sounds. Pulmonary:      Effort: Pulmonary effort is normal.      Breath sounds: Normal breath sounds. Abdominal:      General: There is distension. Palpations: Abdomen is soft. Tenderness: There is no abdominal tenderness. Comments: Dressing in place   Musculoskeletal:         General: No tenderness or signs of injury. Cervical back: Normal range of motion and neck supple. Comments: All distal pulses intact   Skin:     General: Skin is warm and dry. Neurological:      General: No focal deficit present. Mental Status: She is alert. Comments: Sleepy d/t sedation, follows commands R>L         Lines: Villarreal:  yes - Continue villarreal catheter for managing strict I and Os in this critically ill patient. Central line:  yes - right IJ 2/19  PICC:  no    I have reviewed the laboratory studies    CXR:  Tubes and lines in good position    ASSESSMENT/PLAN:  1. Neuro: No active issues   2. Cardio:  Multiple clots--SMA, lungs, left axillary--heparin gtt; s/p embolectomy, vascular following, monitor vascular exam, thrombophilia work-up  a. HTN--off milrinone, restart home meds soon  3. Respiratory: acute respiratory failure--vent management, duonebs, wean FiO2 and try CPAP/PS trials  a. Acute large PE--heparin gtt  4.  GI:  SMA thrombus--s/p thrombectomy, heparin gtt, vascular following, monitor lactates  5. FEN: No active issues   6. Renal:  Oliguria--check FeNa, fluid bolus  7. Heme:  Multiple clots--oncology c/s thrombophilia w/u  8. Endocrine:  Keep glucose <180  9. ID: No active issues       DVT/GI ppx:  Heparin gtt, IVC filter, pepcid    CC TIME:  I spent 39 min managing this patients critical issues which are a constant threat to life excluding time teaching and performing procedures.       Cornell Sood MD, MSc, FACS  2/20/2022  11:02 AM

## 2022-02-20 NOTE — PLAN OF CARE
Problem: Skin Integrity:  Goal: Will show no infection signs and symptoms  Description: Will show no infection signs and symptoms  Outcome: Met This Shift  Goal: Absence of new skin breakdown  Description: Absence of new skin breakdown  Outcome: Met This Shift     Problem: Falls - Risk of:  Goal: Will remain free from falls  Description: Will remain free from falls  Outcome: Met This Shift  Goal: Absence of physical injury  Description: Absence of physical injury  Outcome: Met This Shift     Problem: Non-Violent Restraints  Goal: Removal from restraints as soon as assessed to be safe  Outcome: Not Met This Shift  Goal: No harm/injury to patient while restraints in use  Outcome: Met This Shift  Goal: Patient's dignity will be maintained  Outcome: Met This Shift

## 2022-02-20 NOTE — PROGRESS NOTES
Patient was seen in cardiovascular ICU earlier this morning  Unable to examine her  Echocardiography in progress at bedside  Registered nurse at bedside  Data reviewed in detail  78year-old woman  Postop day 1 IVC filter placement for extensive pulmonary emboli  Status post embolectomy of left subclavian artery and superior mesenteric artery  Currently orally intubated  Oxygenating fairly well on 65% FiO2  Urine output sluggish  Somewhat sedated  Hemodynamically stable otherwise  We will follow with you

## 2022-02-21 ENCOUNTER — APPOINTMENT (OUTPATIENT)
Dept: GENERAL RADIOLOGY | Age: 82
DRG: 270 | End: 2022-02-21
Payer: MEDICARE

## 2022-02-21 LAB
AADO2: 140.6 MMHG
ALBUMIN SERPL-MCNC: 2.5 G/DL (ref 3.5–5.2)
ALP BLD-CCNC: 53 U/L (ref 35–104)
ALT SERPL-CCNC: 18 U/L (ref 0–32)
ANION GAP SERPL CALCULATED.3IONS-SCNC: 6 MMOL/L (ref 7–16)
ANTI-NUCLEAR ANTIBODY (ANA): NEGATIVE
APTT: 60 SEC (ref 24.5–35.1)
AST SERPL-CCNC: 11 U/L (ref 0–31)
AT-III ACTIVITY: 64 % ACTIVITY (ref 83–121)
B.E.: -0.4 MMOL/L (ref -3–3)
BASOPHILS ABSOLUTE: 0.03 E9/L (ref 0–0.2)
BASOPHILS RELATIVE PERCENT: 0.3 % (ref 0–2)
BILIRUB SERPL-MCNC: 0.5 MG/DL (ref 0–1.2)
BUN BLDV-MCNC: 11 MG/DL (ref 6–23)
CALCIUM IONIZED: 1.2 MMOL/L (ref 1.15–1.33)
CALCIUM SERPL-MCNC: 7.6 MG/DL (ref 8.6–10.2)
CHLORIDE BLD-SCNC: 104 MMOL/L (ref 98–107)
CO2: 26 MMOL/L (ref 22–29)
COHB: 0.8 % (ref 0–1.5)
CREAT SERPL-MCNC: 0.6 MG/DL (ref 0.5–1)
CRITICAL: ABNORMAL
DATE ANALYZED: ABNORMAL
DATE OF COLLECTION: ABNORMAL
EOSINOPHILS ABSOLUTE: 0.07 E9/L (ref 0.05–0.5)
EOSINOPHILS RELATIVE PERCENT: 0.6 % (ref 0–6)
FIO2: 40 %
GFR AFRICAN AMERICAN: >60
GFR NON-AFRICAN AMERICAN: >60 ML/MIN/1.73
GLUCOSE BLD-MCNC: 144 MG/DL (ref 74–99)
HBA1C MFR BLD: 7.2 % (ref 4–5.6)
HCO3: 24.3 MMOL/L (ref 22–26)
HCT VFR BLD CALC: 29.9 % (ref 34–48)
HCT VFR BLD CALC: 30.5 % (ref 34–48)
HCT VFR BLD CALC: 32 % (ref 34–48)
HEMOGLOBIN: 9.2 G/DL (ref 11.5–15.5)
HEMOGLOBIN: 9.4 G/DL (ref 11.5–15.5)
HEMOGLOBIN: 9.8 G/DL (ref 11.5–15.5)
HHB: 3.7 % (ref 0–5)
IMMATURE GRANULOCYTES #: 0.05 E9/L
IMMATURE GRANULOCYTES %: 0.4 % (ref 0–5)
LAB: ABNORMAL
LYMPHOCYTES ABSOLUTE: 1.51 E9/L (ref 1.5–4)
LYMPHOCYTES RELATIVE PERCENT: 12.7 % (ref 20–42)
Lab: ABNORMAL
MAGNESIUM: 1.8 MG/DL (ref 1.6–2.6)
MCH RBC QN AUTO: 24.5 PG (ref 26–35)
MCH RBC QN AUTO: 24.7 PG (ref 26–35)
MCH RBC QN AUTO: 24.7 PG (ref 26–35)
MCHC RBC AUTO-ENTMCNC: 30.6 % (ref 32–34.5)
MCHC RBC AUTO-ENTMCNC: 30.8 % (ref 32–34.5)
MCHC RBC AUTO-ENTMCNC: 30.8 % (ref 32–34.5)
MCV RBC AUTO: 80 FL (ref 80–99.9)
MCV RBC AUTO: 80.3 FL (ref 80–99.9)
MCV RBC AUTO: 80.4 FL (ref 80–99.9)
METER GLUCOSE: 124 MG/DL (ref 74–99)
METER GLUCOSE: 126 MG/DL (ref 74–99)
METER GLUCOSE: 135 MG/DL (ref 74–99)
METER GLUCOSE: 142 MG/DL (ref 74–99)
METER GLUCOSE: 145 MG/DL (ref 74–99)
METER GLUCOSE: 149 MG/DL (ref 74–99)
METHB: 0.1 % (ref 0–1.5)
MODE: AC
MONOCYTES ABSOLUTE: 0.91 E9/L (ref 0.1–0.95)
MONOCYTES RELATIVE PERCENT: 7.7 % (ref 2–12)
MRSA CULTURE ONLY: NORMAL
NEUTROPHILS ABSOLUTE: 9.29 E9/L (ref 1.8–7.3)
NEUTROPHILS RELATIVE PERCENT: 78.3 % (ref 43–80)
O2 SATURATION: 96.3 % (ref 92–98.5)
O2HB: 95.4 % (ref 94–97)
OPERATOR ID: 1632
PATIENT TEMP: 37 C
PCO2: 40 MMHG (ref 35–45)
PDW BLD-RTO: 15.1 FL (ref 11.5–15)
PDW BLD-RTO: 15.2 FL (ref 11.5–15)
PDW BLD-RTO: 15.2 FL (ref 11.5–15)
PEEP/CPAP: 8 CMH2O
PFO2: 2.21 MMHG/%
PH BLOOD GAS: 7.4 (ref 7.35–7.45)
PHOSPHORUS: 2.2 MG/DL (ref 2.5–4.5)
PLATELET # BLD: 218 E9/L (ref 130–450)
PLATELET # BLD: 236 E9/L (ref 130–450)
PLATELET # BLD: 237 E9/L (ref 130–450)
PMV BLD AUTO: 9.5 FL (ref 7–12)
PMV BLD AUTO: 9.6 FL (ref 7–12)
PMV BLD AUTO: 9.7 FL (ref 7–12)
PO2: 88.6 MMHG (ref 75–100)
POC ACT LR: 170 SECONDS
POC ACT LR: 204 SECONDS
POC ACT LR: 296 SECONDS
POTASSIUM SERPL-SCNC: 4 MMOL/L (ref 3.5–5)
PROTEIN C ACTIVITY: 83 % ACTIVITY (ref 68–165)
RBC # BLD: 3.72 E12/L (ref 3.5–5.5)
RBC # BLD: 3.8 E12/L (ref 3.5–5.5)
RBC # BLD: 4 E12/L (ref 3.5–5.5)
RI(T): 1.59
RR MECHANICAL: 16 B/MIN
SODIUM BLD-SCNC: 136 MMOL/L (ref 132–146)
SOURCE, BLOOD GAS: ABNORMAL
THB: 10.9 G/DL (ref 11.5–16.5)
TIME ANALYZED: 403
TOTAL PROTEIN: 4.9 G/DL (ref 6.4–8.3)
VT MECHANICAL: 350 ML
WBC # BLD: 11.9 E9/L (ref 4.5–11.5)
WBC # BLD: 12.1 E9/L (ref 4.5–11.5)
WBC # BLD: 12.6 E9/L (ref 4.5–11.5)

## 2022-02-21 PROCEDURE — 6360000002 HC RX W HCPCS: Performed by: EMERGENCY MEDICINE

## 2022-02-21 PROCEDURE — 6360000002 HC RX W HCPCS: Performed by: STUDENT IN AN ORGANIZED HEALTH CARE EDUCATION/TRAINING PROGRAM

## 2022-02-21 PROCEDURE — 85730 THROMBOPLASTIN TIME PARTIAL: CPT

## 2022-02-21 PROCEDURE — 99291 CRITICAL CARE FIRST HOUR: CPT | Performed by: SURGERY

## 2022-02-21 PROCEDURE — 99233 SBSQ HOSP IP/OBS HIGH 50: CPT | Performed by: INTERNAL MEDICINE

## 2022-02-21 PROCEDURE — 6360000002 HC RX W HCPCS: Performed by: SURGERY

## 2022-02-21 PROCEDURE — 94660 CPAP INITIATION&MGMT: CPT

## 2022-02-21 PROCEDURE — 6370000000 HC RX 637 (ALT 250 FOR IP): Performed by: STUDENT IN AN ORGANIZED HEALTH CARE EDUCATION/TRAINING PROGRAM

## 2022-02-21 PROCEDURE — 83036 HEMOGLOBIN GLYCOSYLATED A1C: CPT

## 2022-02-21 PROCEDURE — 2500000003 HC RX 250 WO HCPCS: Performed by: SURGERY

## 2022-02-21 PROCEDURE — 85025 COMPLETE CBC W/AUTO DIFF WBC: CPT

## 2022-02-21 PROCEDURE — 2580000003 HC RX 258: Performed by: STUDENT IN AN ORGANIZED HEALTH CARE EDUCATION/TRAINING PROGRAM

## 2022-02-21 PROCEDURE — 80053 COMPREHEN METABOLIC PANEL: CPT

## 2022-02-21 PROCEDURE — 83735 ASSAY OF MAGNESIUM: CPT

## 2022-02-21 PROCEDURE — 2000000000 HC ICU R&B

## 2022-02-21 PROCEDURE — 36415 COLL VENOUS BLD VENIPUNCTURE: CPT

## 2022-02-21 PROCEDURE — 2700000000 HC OXYGEN THERAPY PER DAY

## 2022-02-21 PROCEDURE — 82962 GLUCOSE BLOOD TEST: CPT

## 2022-02-21 PROCEDURE — 84100 ASSAY OF PHOSPHORUS: CPT

## 2022-02-21 PROCEDURE — 2500000003 HC RX 250 WO HCPCS: Performed by: STUDENT IN AN ORGANIZED HEALTH CARE EDUCATION/TRAINING PROGRAM

## 2022-02-21 PROCEDURE — 94003 VENT MGMT INPAT SUBQ DAY: CPT

## 2022-02-21 PROCEDURE — 6370000000 HC RX 637 (ALT 250 FOR IP): Performed by: SURGERY

## 2022-02-21 PROCEDURE — 36592 COLLECT BLOOD FROM PICC: CPT

## 2022-02-21 PROCEDURE — 82330 ASSAY OF CALCIUM: CPT

## 2022-02-21 PROCEDURE — 71045 X-RAY EXAM CHEST 1 VIEW: CPT

## 2022-02-21 PROCEDURE — 2580000003 HC RX 258: Performed by: SURGERY

## 2022-02-21 PROCEDURE — 82805 BLOOD GASES W/O2 SATURATION: CPT

## 2022-02-21 PROCEDURE — 85027 COMPLETE CBC AUTOMATED: CPT

## 2022-02-21 RX ORDER — ACETAMINOPHEN 325 MG/1
650 TABLET ORAL
Status: DISCONTINUED | OUTPATIENT
Start: 2022-02-21 | End: 2022-02-28 | Stop reason: HOSPADM

## 2022-02-21 RX ORDER — SODIUM CHLORIDE, SODIUM LACTATE, POTASSIUM CHLORIDE, CALCIUM CHLORIDE 600; 310; 30; 20 MG/100ML; MG/100ML; MG/100ML; MG/100ML
INJECTION, SOLUTION INTRAVENOUS CONTINUOUS
Status: DISCONTINUED | OUTPATIENT
Start: 2022-02-21 | End: 2022-02-21

## 2022-02-21 RX ORDER — GABAPENTIN 300 MG/1
900 CAPSULE ORAL 3 TIMES DAILY
Status: DISCONTINUED | OUTPATIENT
Start: 2022-02-21 | End: 2022-02-22

## 2022-02-21 RX ORDER — OXYCODONE HYDROCHLORIDE 10 MG/1
10 TABLET ORAL EVERY 4 HOURS PRN
Status: DISCONTINUED | OUTPATIENT
Start: 2022-02-21 | End: 2022-02-28 | Stop reason: HOSPADM

## 2022-02-21 RX ORDER — OXYCODONE HYDROCHLORIDE 5 MG/1
5 TABLET ORAL EVERY 4 HOURS PRN
Status: DISCONTINUED | OUTPATIENT
Start: 2022-02-21 | End: 2022-02-28 | Stop reason: HOSPADM

## 2022-02-21 RX ORDER — SENNA AND DOCUSATE SODIUM 50; 8.6 MG/1; MG/1
2 TABLET, FILM COATED ORAL 2 TIMES DAILY
Status: DISCONTINUED | OUTPATIENT
Start: 2022-02-21 | End: 2022-02-28 | Stop reason: HOSPADM

## 2022-02-21 RX ORDER — GABAPENTIN 300 MG/1
900 CAPSULE ORAL 3 TIMES DAILY
Status: ON HOLD | COMMUNITY
Start: 2022-02-07 | End: 2022-02-28 | Stop reason: HOSPADM

## 2022-02-21 RX ORDER — POLYETHYLENE GLYCOL 3350 17 G/17G
17 POWDER, FOR SOLUTION ORAL DAILY
Status: DISCONTINUED | OUTPATIENT
Start: 2022-02-21 | End: 2022-02-28 | Stop reason: HOSPADM

## 2022-02-21 RX ORDER — DEXTROSE, SODIUM CHLORIDE, AND POTASSIUM CHLORIDE 5; .45; .15 G/100ML; G/100ML; G/100ML
INJECTION INTRAVENOUS CONTINUOUS
Status: DISCONTINUED | OUTPATIENT
Start: 2022-02-21 | End: 2022-02-23

## 2022-02-21 RX ORDER — MAGNESIUM SULFATE IN WATER 40 MG/ML
2000 INJECTION, SOLUTION INTRAVENOUS ONCE
Status: COMPLETED | OUTPATIENT
Start: 2022-02-21 | End: 2022-02-21

## 2022-02-21 RX ORDER — SODIUM CHLORIDE, SODIUM LACTATE, POTASSIUM CHLORIDE, AND CALCIUM CHLORIDE .6; .31; .03; .02 G/100ML; G/100ML; G/100ML; G/100ML
1000 INJECTION, SOLUTION INTRAVENOUS ONCE
Status: COMPLETED | OUTPATIENT
Start: 2022-02-21 | End: 2022-02-21

## 2022-02-21 RX ADMIN — Medication 50 MCG/HR: at 04:50

## 2022-02-21 RX ADMIN — SODIUM CHLORIDE, POTASSIUM CHLORIDE, SODIUM LACTATE AND CALCIUM CHLORIDE: 600; 310; 30; 20 INJECTION, SOLUTION INTRAVENOUS at 05:59

## 2022-02-21 RX ADMIN — Medication 250 MG: at 17:12

## 2022-02-21 RX ADMIN — Medication 10 ML: at 22:31

## 2022-02-21 RX ADMIN — DULOXETINE HYDROCHLORIDE 20 MG: 20 CAPSULE, DELAYED RELEASE ORAL at 08:07

## 2022-02-21 RX ADMIN — Medication 20 MG: at 22:31

## 2022-02-21 RX ADMIN — ATORVASTATIN CALCIUM 20 MG: 20 TABLET, FILM COATED ORAL at 08:07

## 2022-02-21 RX ADMIN — INSULIN LISPRO 1 UNITS: 100 INJECTION, SOLUTION INTRAVENOUS; SUBCUTANEOUS at 21:26

## 2022-02-21 RX ADMIN — HEPARIN SODIUM 13 UNITS/KG/HR: 10000 INJECTION, SOLUTION INTRAVENOUS at 07:36

## 2022-02-21 RX ADMIN — Medication 10 ML: at 08:07

## 2022-02-21 RX ADMIN — SODIUM CHLORIDE, POTASSIUM CHLORIDE, SODIUM LACTATE AND CALCIUM CHLORIDE 1000 ML: 600; 310; 30; 20 INJECTION, SOLUTION INTRAVENOUS at 11:44

## 2022-02-21 RX ADMIN — POLYETHYLENE GLYCOL 3350 17 G: 17 POWDER, FOR SOLUTION ORAL at 13:02

## 2022-02-21 RX ADMIN — Medication 20 MG: at 08:07

## 2022-02-21 RX ADMIN — PROPOFOL 10 MCG/KG/MIN: 10 INJECTION, EMULSION INTRAVENOUS at 04:45

## 2022-02-21 RX ADMIN — POTASSIUM CHLORIDE, DEXTROSE MONOHYDRATE AND SODIUM CHLORIDE: 150; 5; 450 INJECTION, SOLUTION INTRAVENOUS at 15:50

## 2022-02-21 RX ADMIN — OXYCODONE 5 MG: 5 TABLET ORAL at 21:26

## 2022-02-21 RX ADMIN — GABAPENTIN 900 MG: 300 CAPSULE ORAL at 13:07

## 2022-02-21 RX ADMIN — SENNOSIDES AND DOCUSATE SODIUM 2 TABLET: 50; 8.6 TABLET ORAL at 21:26

## 2022-02-21 RX ADMIN — ACETAMINOPHEN 650 MG: 325 TABLET ORAL at 17:11

## 2022-02-21 RX ADMIN — MAGNESIUM SULFATE HEPTAHYDRATE 2000 MG: 40 INJECTION, SOLUTION INTRAVENOUS at 12:58

## 2022-02-21 RX ADMIN — ACETAMINOPHEN 650 MG: 325 TABLET ORAL at 13:01

## 2022-02-21 RX ADMIN — INSULIN LISPRO 2 UNITS: 100 INJECTION, SOLUTION INTRAVENOUS; SUBCUTANEOUS at 01:01

## 2022-02-21 RX ADMIN — INSULIN LISPRO 2 UNITS: 100 INJECTION, SOLUTION INTRAVENOUS; SUBCUTANEOUS at 05:13

## 2022-02-21 RX ADMIN — GABAPENTIN 900 MG: 300 CAPSULE ORAL at 21:26

## 2022-02-21 RX ADMIN — Medication 250 MG: at 21:26

## 2022-02-21 RX ADMIN — CHLORHEXIDINE GLUCONATE 15 ML: 1.2 RINSE ORAL at 08:07

## 2022-02-21 RX ADMIN — SENNOSIDES AND DOCUSATE SODIUM 2 TABLET: 50; 8.6 TABLET ORAL at 13:02

## 2022-02-21 RX ADMIN — HEPARIN SODIUM 13 UNITS/KG/HR: 10000 INJECTION, SOLUTION INTRAVENOUS at 17:22

## 2022-02-21 ASSESSMENT — PAIN SCALES - GENERAL
PAINLEVEL_OUTOF10: 9
PAINLEVEL_OUTOF10: 0
PAINLEVEL_OUTOF10: 0
PAINLEVEL_OUTOF10: 9
PAINLEVEL_OUTOF10: 0
PAINLEVEL_OUTOF10: 6
PAINLEVEL_OUTOF10: 0
PAINLEVEL_OUTOF10: 6

## 2022-02-21 ASSESSMENT — PAIN DESCRIPTION - FREQUENCY
FREQUENCY: CONTINUOUS

## 2022-02-21 ASSESSMENT — PAIN DESCRIPTION - LOCATION
LOCATION: ABDOMEN
LOCATION: BACK
LOCATION: BACK

## 2022-02-21 ASSESSMENT — PAIN DESCRIPTION - ORIENTATION
ORIENTATION: MID

## 2022-02-21 ASSESSMENT — PAIN DESCRIPTION - PAIN TYPE
TYPE: CHRONIC PAIN
TYPE: CHRONIC PAIN
TYPE: SURGICAL PAIN

## 2022-02-21 ASSESSMENT — PAIN DESCRIPTION - ONSET
ONSET: ON-GOING

## 2022-02-21 ASSESSMENT — PAIN - FUNCTIONAL ASSESSMENT: PAIN_FUNCTIONAL_ASSESSMENT: PREVENTS OR INTERFERES SOME ACTIVE ACTIVITIES AND ADLS

## 2022-02-21 ASSESSMENT — PULMONARY FUNCTION TESTS
PIF_VALUE: 13
PIF_VALUE: 18
PIF_VALUE: 28
PIF_VALUE: 13
PIF_VALUE: 25
PIF_VALUE: 24
PIF_VALUE: 29
PIF_VALUE: 28
PIF_VALUE: 13
PIF_VALUE: 14
PIF_VALUE: 13

## 2022-02-21 ASSESSMENT — PAIN DESCRIPTION - PROGRESSION
CLINICAL_PROGRESSION: GRADUALLY WORSENING
CLINICAL_PROGRESSION: GRADUALLY WORSENING

## 2022-02-21 ASSESSMENT — PAIN DESCRIPTION - DESCRIPTORS
DESCRIPTORS: ACHING;DISCOMFORT
DESCRIPTORS: ACHING;DISCOMFORT
DESCRIPTORS: SORE;DISCOMFORT;CONSTANT

## 2022-02-21 NOTE — FLOWSHEET NOTE
When patient falls asleep periods of apnea noted with drop in O2 saturations to 83% on O2 @ 5L n/c. Dr Oates Pae aware.  Bipap ordered for HS

## 2022-02-21 NOTE — PLAN OF CARE
Problem: Skin Integrity:  Goal: Will show no infection signs and symptoms  Description: Will show no infection signs and symptoms  0/21/4344 9775 by Dolores Muhammad RN  Outcome: Met This Shift  2/21/2022 0611 by Jeanette Gan RN  Outcome: Met This Shift  Goal: Absence of new skin breakdown  Description: Absence of new skin breakdown  3/27/6203 8745 by Dolores Muhammad RN  Outcome: Met This Shift  2/21/2022 0611 by Jeanette Gan RN  Outcome: Met This Shift     Problem: Falls - Risk of:  Goal: Will remain free from falls  Description: Will remain free from falls  3/34/7130 6588 by Dolores Muhammad RN  Outcome: Met This Shift  2/21/2022 0611 by Jeanette Gan RN  Outcome: Met This Shift  Goal: Absence of physical injury  Description: Absence of physical injury  8/47/8203 9281 by Dolores Muhammad RN  Outcome: Met This Shift  2/21/2022 0611 by Jeanette Gan RN  Outcome: Met This Shift     Problem: Non-Violent Restraints  Goal: Removal from restraints as soon as assessed to be safe  Outcome: Not Met This Shift  Goal: No harm/injury to patient while restraints in use  3/13/4648 0021 by Dolores Muhammad RN  Outcome: Met This Shift  2/21/2022 0611 by Jeanette Gan RN  Outcome: Met This Shift  Goal: Patient's dignity will be maintained  Outcome: Met This Shift     Problem: Pain:  Goal: Pain level will decrease  Description: Pain level will decrease  9/10/1562 8709 by Dolores Muhammad RN  Outcome: Met This Shift  2/21/2022 0611 by Jeanette Gan RN  Outcome: Met This Shift  Goal: Control of acute pain  Description: Control of acute pain  Outcome: Met This Shift  Goal: Control of chronic pain  Description: Control of chronic pain  Outcome: Met This Shift

## 2022-02-21 NOTE — PLAN OF CARE
Problem: Skin Integrity:  Goal: Will show no infection signs and symptoms  Description: Will show no infection signs and symptoms  Outcome: Met This Shift  Goal: Absence of new skin breakdown  Description: Absence of new skin breakdown  Outcome: Met This Shift     Problem: Falls - Risk of:  Goal: Will remain free from falls  Description: Will remain free from falls  Outcome: Met This Shift  Goal: Absence of physical injury  Description: Absence of physical injury  Outcome: Met This Shift     Problem: Non-Violent Restraints  Goal: No harm/injury to patient while restraints in use  Outcome: Met This Shift     Problem: Pain:  Goal: Pain level will decrease  Description: Pain level will decrease  Outcome: Met This Shift

## 2022-02-21 NOTE — PROCEDURES
Spontaneous Parameters performed    VT = 318 ml  f = 16  B/M  Ve = 5.08 L/M  NIF = -31 cmH2O  VC = N/A  RSBI = 50      Performed by Mariano Valdez RCP

## 2022-02-21 NOTE — CARE COORDINATION
Social Work Discharge Planning:  Patient intubated possible extubation today. Patient is on a heparin drip. SW met with patient's son Natalie Ferreira and daughter Meri Mena explained transition of care. .  Patient lives alone in a two story home. PTA patient independent last week the family bought a walker to assist pt with walking at home. Patient has no hx with [facility] or [facility]. Patient's PCP is Dr. Kathrine San and Pharmacy is [facility] in [facility]. Patient's family will transport the patient home at discharge. SW will await PT/OT evals to assist with transition of care. SW continue to follow.   Electronically signed by BERNARDO Hall on 2/21/2022 at 10:10 AM

## 2022-02-21 NOTE — PROGRESS NOTES
VASCULAR SURGERY  DAILY PROGRESS NOTE    Date:2022       AMEB:[id number]  Patient Jackeline Evans     YOB: 1940     Age:81 y.o. Chief Complaint:  Chief Complaint   Patient presents with    Numbness     L arm numbness, started at 1030 today, -thinners, bgl 219 per ems        Subjective:  POD2 s/p IVC filter, ex lap, mesenteric artery embolectomy, left arm embolectomy    Objective:  /66   Pulse 107   Temp 98.9 °F (37.2 °C) (Axillary)   Resp 16   Ht 5' 1\" (1.549 m)   Wt 191 lb 12.8 oz (87 kg)   SpO2 95%   BMI 36.24 kg/m²   Temp (24hrs), Av.4 °F (37.4 °C), Min:98.7 °F (37.1 °C), Max:100.2 °F (37.9 °C)      I/O (24Hr):  I/O last 3 completed shifts: In: 1551.7 [I.V.:1551.7]  Out: 4029 [LWESN:2858]     GENERAL:  No acute distress. Sedated on the ventilator  LUNGS:  No cough. Nonlabored breathing on vent  CARDIOVASC:  Tachy rate, warm. ABDOMEN:  Soft, non-distended, appropriately tender. Aquacell dressing C/D/I  EXTREMITIES: Left arm incision clean dry and intact. Palpable radial and ulnar pulses.   Right DP biphasic signal.  Right PT and left DP PT triphasic signal. ++edema    Assessment:  78 y.o. female with DVT, PE, and emboli to the left subclavian artery and superior mesenteric artery    Plan:  -Continue heparin drip  -Okay to extubate today from vascular standpoint  -ECHO negative for PFO  -Hematology consulted for hypercoagulable work up, appreciate recommendations    Electronically signed by Kamala Faye MD on 2022 at 6:59 AM

## 2022-02-21 NOTE — PROCEDURES
Patient was extubated to 5 liters/min via nasal cannula. Breath Sounds post extubation were equal. Stridor was not present post extubation. SPO2 was 96  %.       Performed by  Christie Rivas RCP

## 2022-02-21 NOTE — PROGRESS NOTES
incision. Aquacel dressing in place with no strike through noted  R UE: 2+ radial  L UE: upper arm incision well approximated, slight ecchymosis   R LE: foot, warm, well perfused. + DP/PT signal  L LE: foot warm, well perfused.  2+ DP, + PT signal    LABS:    Lab Results   Component Value Date    WBC 12.6 (H) 02/21/2022    HGB 9.2 (L) 02/21/2022    HCT 29.9 (L) 02/21/2022     02/21/2022    PROTIME 12.1 02/19/2022    INR 1.1 02/19/2022    APTT 60.0 (H) 02/21/2022    K 4.0 02/21/2022    BUN 11 02/21/2022    CREATININE 0.6 02/21/2022       RADIOLOGY:

## 2022-02-21 NOTE — CONSULTS
Inpatient Hematology/Oncology Consult Note    Reason for Visit:  Consultation on a patient with PE    Referring Physician: Jada Bueno MD    PCP:  Erich Yoo MD    History of Present Illness:  78 y. o. female with Hx of HTN, DMII, lymphedema, multiple prior DVT who was treated with short term anticoagulation, not currently taking, who presented to the ED with acute onset numbness, tingling and loss of strength in the left arm this morning. She was actually found to have bilateral massive PE, left subclavian artery thrombus, and SMA thrombus. She was started on a heparin drip and then taken to OR by Vascular for SMA embolectomy, IVC filter, and left arm embolectomy. Review of Systems; Unable to obtain due intubated    Past Medical History:      Diagnosis Date    Anxiety     Arthritis     osteoarthritis    Depression     Diverticulitis     Dupuytren's contracture     Hyperlipidemia     Hypertension     Hypothyroidism     Lymphedema     Pre-diabetes      Past Surgical History:      Procedure Laterality Date    HYSTERECTOMY       Family History:  History reviewed. No pertinent family history. Medications:  Reviewed and reconciled. Social History:  Social History     Socioeconomic History    Marital status:       Spouse name: Not on file    Number of children: Not on file    Years of education: Not on file    Highest education level: Not on file   Occupational History    Not on file   Tobacco Use    Smoking status: Never Smoker    Smokeless tobacco: Never Used   Substance and Sexual Activity    Alcohol use: No     Alcohol/week: 0.0 standard drinks    Drug use: Not on file    Sexual activity: Not on file   Other Topics Concern    Not on file   Social History Narrative    Not on file     Social Determinants of Health     Financial Resource Strain:     Difficulty of Paying Living Expenses: Not on file   Food Insecurity:     Worried About [address] in the Last Year: Not on file    Ran Out of Food in the Last Year: Not on file   Transportation Needs:     Lack of Transportation (Medical): Not on file    Lack of Transportation (Non-Medical): Not on file   Physical Activity:     Days of Exercise per Week: Not on file    Minutes of Exercise per Session: Not on file   Stress:     Feeling of Stress : Not on file   Social Connections:     Frequency of Communication with Friends and Family: Not on file    Frequency of Social Gatherings with Friends and Family: Not on file    Attends Sabianism Services: Not on file    Active Member of [address] [place] [address] [employer] or Organizations: Not on file    Attends Club or Organization Meetings: Not on file    Marital Status: Not on file   Intimate Partner Violence:     Fear of Current or Ex-Partner: Not on file    Emotionally Abused: Not on file    Physically Abused: Not on file    Sexually Abused: Not on file   Housing Stability:     Unable to Pay for Housing in the Last Year: Not on file    Number of [place] in the Last Year: Not on file    Unstable Housing in the Last Year: Not on file     Allergies:  No Known Allergies    Physical Exam:  /66   Pulse 124   Temp 99.1 °F (37.3 °C) (Axillary)   Resp 18   Ht 5' 1\" (1.549 m)   Wt 191 lb 12.8 oz (87 kg)   SpO2 93%   BMI 36.24 kg/m²   GENERAL: intubated sedated  HEENT: PERRLA; EOMI. Oropharynx cdry. NECK: Supple. Without lymphadenopathy. LUNGS: Good air entry bilaterally. No wheezing, crackles or ronchi. CARDIOVASCULAR: Regular rate. No murmurs, rubs or gallops. ABDOMEN: Soft. Non-tender, non-distended. EXTREMITIES: Without clubbing, cyanosis, or edema. NEUROLOGIC: No focal deficits.      Lab Results   Component Value Date    WBC 11.6 (H) 02/20/2022    HGB 10.4 (L) 02/20/2022    HCT 33.5 (L) 02/20/2022    MCV 78.3 (L) 02/20/2022     02/20/2022     Lab Results   Component Value Date     02/20/2022    K 4.3 02/20/2022     02/20/2022    CO2 22 02/20/2022    BUN 15 02/20/2022    CREATININE 0.7 02/20/2022    GLUCOSE 191 (H) 02/20/2022    CALCIUM 8.5 (L) 02/20/2022    PROT 5.8 (L) 02/20/2022    LABALBU 3.1 (L) 02/20/2022    BILITOT 0.3 02/20/2022    ALKPHOS 63 02/20/2022    AST 20 02/20/2022    ALT 33 (H) 02/20/2022    LABGLOM >60 02/20/2022    GFRAA >60 02/20/2022     Lab Results   Component Value Date    INR 1.1 02/19/2022    PROTIME 12.1 02/19/2022     Lab Results   Component Value Date    APTT 69.2 (H) 02/20/2022     Lab Results   Component Value Date    DDIMER 4531 02/20/2022     Impression/Plan:  78 y. o. female with Hx of HTN, DMII, lymphedema, multiple prior DVT who was treated with short term anticoagulation, not currently taking, who presented with bilateral massive PE, left subclavian artery thrombus, and SMA thrombus. She was started on a heparin drip and then taken to OR by Vascular for SMA embolectomy, IVC filter, and left arm embolectomy. Hypercoag work-up in process  Continue Heparin drip   Continue to monitor CBC   Hb 10.4; Transfuse if Hb <7.0  Will continue to follow    Thank you for allowing us to participate in the care of .  Kale Hood MD   2/20/2022

## 2022-02-21 NOTE — PROGRESS NOTES
[facility]  SURGICAL INTENSIVE CARE UNIT    CRITICAL CARE ATTENDING PROGRESS NOTE    I have examined the patient, reviewed the record, and discussed the case with the APN/  Resident. I have reviewed all relevant labs and imaging data. Please refer to the  APN/ resident's note. I agree with the  assessment and plan with the following corrections/ additions. The following summarizes my clinical findings and independent assessment. CC: Critical care management after IVC filter, ex lap, mesenteric artery embolectomy, left arm embolectomy on 2/19    HOSPITAL COURSE:  2/19 IVC filter, ex lap, SMA embolectomy, left arm embolectomy  2/21 tolerating pressure support trials. Will wean to extubate  EXAM:  Awake and alert, intubated  Follows commands  2+ radial pulses bilaterally  Left arm incision clean dry intact  Sinus tach  Abdomen soft nondistended probably tender Aquacel dressing clean dry intact      ASSESSMENT:  Principal Problem:    Acute mesenteric arterial occlusion (HCC)  Active Problems:    Acute saddle pulmonary embolism with acute cor pulmonale (HCC)    Occlusion of left subclavian artery    Limb ischemia    Multiple pulmonary emboli (HCC)  Resolved Problems:    * No resolved hospital problems. *       PLAN:  Sedation/ Pain: Hold sedation      Pulmonary: Acute respiratory failurepressure support wean. Weaning parameters reviewed. Plan on extubation today    GI: N.p.o. status post ex lap    FEN: Urine output improved with IV fluids. Continue Riley for strict I's and O's         Heme: Multiple pulmonary emboli, SMA occlusioncontinue heparin drip  Echo shows no PFO    Endo: Monitor Blood Sugars. Target blood glucose less than 180 in the ICU.       DVT prophylaxis--SCDS  GI Prophylaxis--pepcid  Lines--Central line right IJ 2/19  CODE: FULL     DISPOSITION-Continue ICU Care    Critical care time exclusive of teaching and procedures = 34 min     Pt needs continuous ICU monitoring because the patient is at risk for deterioration from a resp failure standpoint. Edis Hebert MD, FACS  2/21/2022  11:29 AM    NOTE: This report was transcribed using voice recognition software. Every effort was made to ensure accuracy; however, inadvertent computerized transcription errors may be present.

## 2022-02-21 NOTE — PROGRESS NOTES
Surgical Intensive Care Unit  Daily Progress Note  Date: 2/21/2022        Patient Name: Harry Estevez     YOB: 1940      Age:  78 y.o. Gender: female    Chief complaint:  Chief Complaint   Patient presents with    Numbness     L arm numbness, started at 1030 today, -thinners, bgl 219 per ems        Date of admission:  2/19/2022  LOS: 2  Dates in ICU: 2/19-now  Reason for ICU:  Vascular risk  Hospital course:    2/19 emergent exlap SMA embolectomy & L subclavian embolectomy, CVICU  2/20 continue heparin drip. Prerenal FeNa      Subjective:  Passed extubation parameters. Extubated this early afternoon. Passed bedside swallow. Complains mainly of back pain. No nausea/emesis. No distal paresthesias. UOP still low overnight despite boluses, but afternoon bolus has finally improved the UOP. Physical Exam:  /82   Pulse 102   Temp 98 °F (36.7 °C) (Oral)   Resp 19   Ht 5' 1\" (1.549 m)   Wt 213 lb (96.6 kg)   SpO2 98%   BMI 40.25 kg/m²       I/O last 3 completed shifts: In: 4863.7 [I.V.:4863.7]  Out: 1080 [Urine:2027; Emesis/NG output:550]    Vent Settings: Additional Respiratory  Assessments  Pulse: 102  Resp: 19  SpO2: 98 %  Position: Semi-Mejia's  Humidification Source: Heated wire  Humidification Temp: 37  Oral Care: Suction toothette,Mouth moisturizer,Mouth swabbed,Mouthwash with chlorhexidine  Subglottic Suction Done?: No    GENERAL: NAD, awake and conversational  NEURO: MAEx4, pupils equal  HEAD: Normocephalic, atraumatic  NECK: Trachea midline, mucous membranes dry  CARDIO-VASCULAR: Mildly tachy rate, no cyanosis  RESPIRATORY: Nonlabored breathing, 5LNC, CTAB  GENITOURINARY:  Riley with hudson urine  ABDOMEN / GI: Soft, obese but non-distended, appropriately-tender. Incision c/d/i with minimal strikethru. SKIN: Warm, dry  MSK: No deformities, trace edema. L radial pulse strong, weak ulnar (stable). R fem puncture site gauze c/d/i without hematoma.  BLE good ankle motion and pink. Bilateral DPPT multiphasic. LABS:  ABG   Recent Labs     22  0403   PH 7.402   PCO2 40.0   PO2 88.6   HCO3 24.3   BE -0.4   O2SAT 96.3       BMP/Lytes  Recent Labs     22  0500         CO2 26   BUN 11   CREATININE 0.6     Recent Labs     22  0500   K 4.0   MG 1.8   PHOS 2.2*   CALCIUM 7.6*     Liver Function  Recent Labs     22  1239 22  1350 22  0500   ALKPHOS  --    < > 53   ALT  --    < > 18   AST  --    < > 11   BILITOT  --    < > 0.5   PROT  --    < > 4.9*   LABALBU  --    < > 2.5*   INR 1.1  --   --     < > = values in this interval not displayed. CBC/coags  Recent Labs     22  1105   WBC 12.6*   HGB 9.2*   HCT 29.9*        Recent Labs     22  1239   INR 1.1     Other  No results for input(s): LACTATE, PROCAL, CORTISOL, TSH, CK, BNP in the last 72 hours. Invalid input(s): TROPONIN      IMAGIN/20 echo: no PFO, no atrial thrombus      Problem List:   Hospital Problems           Last Modified POA    * (Principal) Acute mesenteric arterial occlusion ([address] 2022 Yes    Acute saddle pulmonary embolism with acute cor pulmonale ([address] 2022 Yes    Occlusion of left subclavian artery 2022 Yes    Limb ischemia 2022 Yes    Multiple pulmonary emboli ([address] 2022 Yes            ASSESSMENT / PLAN:    Neuro:    Acute Pain - change fent gtt to PO+IV prn's once passes swallow after extubated  Chronic back pain - home gabapentin 900 TID    Home cymbalta    CV:  Multiple clots (SMA, L subclavian, PE, prior DVT)   - s/p exlap SMA embolectomy and LUE embolectomy   - echo w/o PFO/thromb  - heparin drip  - H/O for thrombophilia workup  - vascular checks    HTN - possibly resume home lasix & losartan/HCTZ tomorrow.  Cont home lipitor    Pulm:   Respiratory failure resolved - extubate today    Renal:  Oliguria, prerenal, resolving - slow IVF, home lasix if UOP or urine lytes ok     GI:    Once return of bowel function, will start diabetic cardiac diet    Heme:  ABLA due to surgery - stabilizing, monitor daily  Thrombophilia workup per H/O    ID:  Reactive leukocytosis - No indication for abx    Endo:   Pre-diabetes - ISS Q4 to ACHS once on PO diet    MSK:  Deconditioning due to illness - PTOT    Bowel reg:  Glycolax, docsenna  DVT ppx:  SCDs, hep gtt  GI ppx:  Stop pepcid  Seizure proph:  n/a  Mouth/eye care: Stop Lacrilube/Peridex  Riley:  For critical care monitoring of fluid balance  Central lines: R IJ CVC 2/19  Family Update: As available   CODE Status:  Full code     Dispo:  ICU    Please see attending note for corrections/updates.     Electronically signed by Jerson Garcia MD on 2/21/2022 at 1:46 PM

## 2022-02-21 NOTE — PROGRESS NOTES
Subjective:  2/21/2022  78year-old woman  Seen in cardiovascular ICU earlier this morning  Registered nurse at bedside  Orally intubated  Orogastric tube draining bloody material  Oxygenating well  Awaiting to be extubated  Data reviewed in detail  Objective:    BP (!) 143/75   Pulse 100   Temp 99.5 °F (37.5 °C) (Oral)   Resp 17   Ht 5' 1\" (1.549 m)   Wt 191 lb 12.8 oz (87 kg)   SpO2 96%   BMI 36.24 kg/m²   She is partially sedated  Opens eyes  Pupils were equal  Orally intubated  Orogastric tube intact  IJ line in the right side of the neck  Trachea midline  No adenopathy  Heart:  RRR, no murmurs, gallops, or rubs.   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 status post embolectomy postop day 2  Mesenteric arterial thrombosis status post embolectomy postop day 2  Overall medically stable  Patient Active Problem List   Diagnosis    Essential hypertension, benign    Lymphedema    Osteoarthritis    Hyperlipidemia    Anxiety    Acute mesenteric arterial occlusion (HCC)    Acute saddle pulmonary embolism with acute cor pulmonale (HCC)    Occlusion of left subclavian artery    Limb ischemia    Multiple pulmonary emboli ([address]       Plan:  Plan on extubation today  Continue IV heparin  Sedatives to be weaned off  ICU care as outlined        Emeterio David MD  9:38 AM  2/21/2022

## 2022-02-21 NOTE — PROGRESS NOTES
Candida Cleaning 1940    SUBJECTIVE:  Extubated today. Remains a bit disoriented. Afebrile. OBJECTIVE  Physical Exam:  VITALS:  BP (!) 142/70   Pulse 105   Temp 99.5 °F (37.5 °C) (Oral)   Resp 23   Ht 5' 1\" (1.549 m)   Wt 213 lb (96.6 kg)   SpO2 98%   BMI 40.25 kg/m²    GENERAL: Fatigued. Alert. Disorientted. ENT:  oropharynx clear, no evidence of mucositis. NECK:  No  lymphadenopathy. LUNGS:  Clear to auscultation   CARDIOVASCULAR:  Regular rate and rhythm. No clicks, murmurs, rubs or gallops. ABDOMEN:  Soft, nontender. NEUROLOGIC:  No focal deficits. EXTREMITIES: without clubbing, cyanosis, or edema.     DIAGNOSTIC DATA  Lab Results   Component Value Date    WBC 12.6 (H) 02/21/2022    HGB 9.2 (L) 02/21/2022    HCT 29.9 (L) 02/21/2022    MCV 80.4 02/21/2022     02/21/2022     Recent Labs     02/19/22  2300 02/20/22  0500 02/21/22  0500    139 136   CO2 20* 22 26   PHOS 3.8 3.5 2.2*   BUN 13 15 11   CREATININE 0.7 0.7 0.6     Lab Results   Component Value Date    ALT 18 02/21/2022    AST 11 02/21/2022    ALKPHOS 53 02/21/2022    BILITOT 0.5 02/21/2022     Lab Results   Component Value Date    LABALBU 2.5 (L) 02/21/2022     Current Facility-Administered Medications   Medication Dose Route Frequency Provider Last Rate Last Admin    lactated ringers bolus  1,000 mL IntraVENous Once Casandra Anthony MD 1,000 mL/hr at 02/21/22 1144 1,000 mL at 02/21/22 1144    oxyCODONE (ROXICODONE) immediate release tablet 5 mg  5 mg Oral Q4H PRN Casandra Anthony MD        Or   Lionel You oxyCODONE HCl (OXY-IR) immediate release tablet 10 mg  10 mg Oral Q4H PRN Casandra Anthony MD        HYDROmorphone (DILAUDID) injection 0.5 mg  0.5 mg IntraVENous Q4H PRN Casandra Anthony MD        acetaminophen (TYLENOL) tablet 650 mg  650 mg Oral Q4H While awake Casandra Anthony MD        polyethylene glycol [facility]) packet 17 g  17 g Oral Daily Casandra Anthony MD        sennosides-docusate sodium (SENOKOT-S) 8.6-50 MG tablet 2 tablet 2 tablet Oral BID Ilsa Guallpa MD        glucose (GLUTOSE) 40 % oral gel 15 g  15 g Oral PRN Megan Dennis MD        dextrose 50 % IV solution  12.5 g IntraVENous PRN Megan Dennis MD        glucagon (rDNA) injection 1 mg  1 mg IntraMUSCular PRN Megan Dennis MD        dextrose 5 % solution  100 mL/hr IntraVENous PRN Megan Dennis MD        insulin lispro (HUMALOG) injection vial 0-12 Units  0-12 Units SubCUTAneous Q4H Megan Dennis MD   2 Units at 02/21/22 4210    atorvastatin (LIPITOR) tablet 20 mg  20 mg Oral Daily Megan Dennis MD   20 mg at 02/21/22 9206    DULoxetine (CYMBALTA) extended release capsule 20 mg  20 mg Oral Daily Megan Dennis MD   20 mg at 02/21/22 4063    heparin (porcine) injection 6,950 Units  80 Units/kg IntraVENous PRN Hannah Clay DO        heparin (porcine) injection 3,480 Units  40 Units/kg IntraVENous PRN Hannah Clay DO        heparin 25,000 units in dextrose 5% 250 mL (premix) infusion  18 Units/kg/hr IntraVENous Continuous Niru Espinal DO 11.3 mL/hr at 02/21/22 0736 13 Units/kg/hr at 02/21/22 0736    sodium chloride flush 0.9 % injection 5-40 mL  5-40 mL IntraVENous 2 times per day Megan Dennis MD   10 mL at 02/21/22 6324    sodium chloride flush 0.9 % injection 5-40 mL  5-40 mL IntraVENous PRN Megan Dennis MD        0.9 % sodium chloride infusion  25 mL IntraVENous PRN Megan Dennis MD        perflutren lipid microspheres (DEFINITY) injection 1.65 mg  1.5 mL IntraVENous ONCE PRN Megan Dennis MD        lactated ringers infusion   IntraVENous Continuous Megan Dennis  mL/hr at 02/21/22 0559 New Bag at 02/21/22 0559    famotidine (PEPCID) suspension 20 mg  20 mg Per NG tube BID Megan Dennis MD   20 mg at 02/21/22 0807     IMPRESSION:  Patient Active Problem List   Diagnosis    Essential hypertension, benign    Lymphedema    Osteoarthritis    Hyperlipidemia    Anxiety    Acute mesenteric arterial occlusion (HCC)    Acute saddle pulmonary embolism with acute cor pulmonale (HCC)    Occlusion of left subclavian artery    Limb ischemia    Multiple pulmonary emboli (HCC)     Impression/Plan:  78 y.o. female with Hx of HTN, DMII, lymphedema, multiple prior DVT who was treated with short term anticoagulation, not currently taking, who presented with bilateral massive PE, left subclavian artery thrombus, and SMA thrombus. She was started on a heparin drip and then taken to OR by Vascular for SMA embolectomy, IVC filter, and left arm embolectomy. Hypercoag work-up in process  D-Dimer 4531  Homocysteine 4.3  DRVVT Negative  Cardiolipin Beta-2 Glycoprotein pending  AT-III Activity 64%   Protein C 83%   Protein S pending  Thrombophilia pending  Continue Heparin drip   Continue to monitor CBC   Hb 9.2 on 02/21/2022; Transfuse if Hb <7.0  Will continue to follow     Thank you for allowing us to participate in the care of Mrs. Cleaning. Nilton Graf, [address] [facility]    The patient was seen and examined, I agree with Nilton Graf CNP's findings, assessment and plan as outlined.   02/21/2022  Katie Holloway MD

## 2022-02-21 NOTE — PLAN OF CARE
Problem: Non-Violent Restraints  Goal: Removal from restraints as soon as assessed to be safe  3/78/1871 6308 by Moises Romero RN  Outcome: Completed  4/11/7171 3749 by Moises Romero RN  Outcome: Not Met This Shift  Goal: No harm/injury to patient while restraints in use  9/92/6210 1906 by Moises Romero RN  Outcome: Completed  9/73/0909 2832 by Moises Romero RN  Outcome: Met This Shift  2/21/2022 0611 by Jose Angel Gilman RN  Outcome: Met This Shift  Goal: Patient's dignity will be maintained  8/86/8093 3922 by Moises Romero RN  Outcome: Completed  1/38/9904 4102 by Moises Romero RN  Outcome: Met This Shift

## 2022-02-21 NOTE — PROGRESS NOTES
Notified Dr. Bailey Mail of [facility] on PS -   continue on PS for now. Do not extubate until after sign out.

## 2022-02-22 ENCOUNTER — APPOINTMENT (OUTPATIENT)
Dept: GENERAL RADIOLOGY | Age: 82
DRG: 270 | End: 2022-02-22
Payer: MEDICARE

## 2022-02-22 LAB
ALBUMIN SERPL-MCNC: 2.2 G/DL (ref 3.5–5.2)
ALP BLD-CCNC: 63 U/L (ref 35–104)
ALT SERPL-CCNC: 14 U/L (ref 0–32)
ANION GAP SERPL CALCULATED.3IONS-SCNC: 9 MMOL/L (ref 7–16)
APTT: 35.6 SEC (ref 24.5–35.1)
APTT: 65.2 SEC (ref 24.5–35.1)
AST SERPL-CCNC: 12 U/L (ref 0–31)
BASOPHILS ABSOLUTE: 0.03 E9/L (ref 0–0.2)
BASOPHILS RELATIVE PERCENT: 0.3 % (ref 0–2)
BILIRUB SERPL-MCNC: 0.4 MG/DL (ref 0–1.2)
BUN BLDV-MCNC: 9 MG/DL (ref 6–23)
CALCIUM IONIZED: 1.22 MMOL/L (ref 1.15–1.33)
CALCIUM SERPL-MCNC: 7.8 MG/DL (ref 8.6–10.2)
CHLORIDE BLD-SCNC: 106 MMOL/L (ref 98–107)
CO2: 26 MMOL/L (ref 22–29)
CREAT SERPL-MCNC: 0.6 MG/DL (ref 0.5–1)
CREATININE URINE: 197 MG/DL (ref 29–226)
EOSINOPHILS ABSOLUTE: 0.26 E9/L (ref 0.05–0.5)
EOSINOPHILS RELATIVE PERCENT: 2.3 % (ref 0–6)
GFR AFRICAN AMERICAN: >60
GFR NON-AFRICAN AMERICAN: >60 ML/MIN/1.73
GLUCOSE BLD-MCNC: 132 MG/DL (ref 74–99)
HCT VFR BLD CALC: 27.2 % (ref 34–48)
HEMOGLOBIN: 8.3 G/DL (ref 11.5–15.5)
IMMATURE GRANULOCYTES #: 0.07 E9/L
IMMATURE GRANULOCYTES %: 0.6 % (ref 0–5)
LYMPHOCYTES ABSOLUTE: 1.59 E9/L (ref 1.5–4)
LYMPHOCYTES RELATIVE PERCENT: 14 % (ref 20–42)
MAGNESIUM: 2.2 MG/DL (ref 1.6–2.6)
MCH RBC QN AUTO: 24.9 PG (ref 26–35)
MCHC RBC AUTO-ENTMCNC: 30.5 % (ref 32–34.5)
MCV RBC AUTO: 81.4 FL (ref 80–99.9)
METER GLUCOSE: 113 MG/DL (ref 74–99)
METER GLUCOSE: 120 MG/DL (ref 74–99)
METER GLUCOSE: 129 MG/DL (ref 74–99)
METER GLUCOSE: 144 MG/DL (ref 74–99)
METER GLUCOSE: 146 MG/DL (ref 74–99)
MONOCYTES ABSOLUTE: 0.75 E9/L (ref 0.1–0.95)
MONOCYTES RELATIVE PERCENT: 6.6 % (ref 2–12)
NEUTROPHILS ABSOLUTE: 8.66 E9/L (ref 1.8–7.3)
NEUTROPHILS RELATIVE PERCENT: 76.2 % (ref 43–80)
PDW BLD-RTO: 15.1 FL (ref 11.5–15)
PHOSPHORUS: 2.1 MG/DL (ref 2.5–4.5)
PLATELET # BLD: 221 E9/L (ref 130–450)
PMV BLD AUTO: 9.8 FL (ref 7–12)
POTASSIUM SERPL-SCNC: 3.9 MMOL/L (ref 3.5–5)
RBC # BLD: 3.34 E12/L (ref 3.5–5.5)
SODIUM BLD-SCNC: 141 MMOL/L (ref 132–146)
SODIUM URINE: 50 MMOL/L
TOTAL PROTEIN: 4.6 G/DL (ref 6.4–8.3)
WBC # BLD: 11.4 E9/L (ref 4.5–11.5)

## 2022-02-22 PROCEDURE — 36592 COLLECT BLOOD FROM PICC: CPT

## 2022-02-22 PROCEDURE — 82570 ASSAY OF URINE CREATININE: CPT

## 2022-02-22 PROCEDURE — 94660 CPAP INITIATION&MGMT: CPT

## 2022-02-22 PROCEDURE — 6360000002 HC RX W HCPCS: Performed by: EMERGENCY MEDICINE

## 2022-02-22 PROCEDURE — 84100 ASSAY OF PHOSPHORUS: CPT

## 2022-02-22 PROCEDURE — 71045 X-RAY EXAM CHEST 1 VIEW: CPT

## 2022-02-22 PROCEDURE — 2580000003 HC RX 258: Performed by: STUDENT IN AN ORGANIZED HEALTH CARE EDUCATION/TRAINING PROGRAM

## 2022-02-22 PROCEDURE — 97162 PT EVAL MOD COMPLEX 30 MIN: CPT

## 2022-02-22 PROCEDURE — 85730 THROMBOPLASTIN TIME PARTIAL: CPT

## 2022-02-22 PROCEDURE — 82330 ASSAY OF CALCIUM: CPT

## 2022-02-22 PROCEDURE — 80053 COMPREHEN METABOLIC PANEL: CPT

## 2022-02-22 PROCEDURE — 97166 OT EVAL MOD COMPLEX 45 MIN: CPT

## 2022-02-22 PROCEDURE — 2000000000 HC ICU R&B

## 2022-02-22 PROCEDURE — 2500000003 HC RX 250 WO HCPCS: Performed by: SURGERY

## 2022-02-22 PROCEDURE — 83735 ASSAY OF MAGNESIUM: CPT

## 2022-02-22 PROCEDURE — 36415 COLL VENOUS BLD VENIPUNCTURE: CPT

## 2022-02-22 PROCEDURE — 97530 THERAPEUTIC ACTIVITIES: CPT

## 2022-02-22 PROCEDURE — 84300 ASSAY OF URINE SODIUM: CPT

## 2022-02-22 PROCEDURE — 85025 COMPLETE CBC W/AUTO DIFF WBC: CPT

## 2022-02-22 PROCEDURE — 2700000000 HC OXYGEN THERAPY PER DAY

## 2022-02-22 PROCEDURE — 82962 GLUCOSE BLOOD TEST: CPT

## 2022-02-22 PROCEDURE — 99233 SBSQ HOSP IP/OBS HIGH 50: CPT | Performed by: INTERNAL MEDICINE

## 2022-02-22 PROCEDURE — 6370000000 HC RX 637 (ALT 250 FOR IP): Performed by: SURGERY

## 2022-02-22 PROCEDURE — 6370000000 HC RX 637 (ALT 250 FOR IP): Performed by: STUDENT IN AN ORGANIZED HEALTH CARE EDUCATION/TRAINING PROGRAM

## 2022-02-22 PROCEDURE — 99233 SBSQ HOSP IP/OBS HIGH 50: CPT | Performed by: SURGERY

## 2022-02-22 PROCEDURE — 6370000000 HC RX 637 (ALT 250 FOR IP): Performed by: INTERNAL MEDICINE

## 2022-02-22 RX ORDER — LOSARTAN POTASSIUM AND HYDROCHLOROTHIAZIDE 25; 100 MG/1; MG/1
1 TABLET ORAL DAILY
Status: DISCONTINUED | OUTPATIENT
Start: 2022-02-22 | End: 2022-02-22 | Stop reason: CLARIF

## 2022-02-22 RX ORDER — GABAPENTIN 300 MG/1
300 CAPSULE ORAL 3 TIMES DAILY
Status: DISCONTINUED | OUTPATIENT
Start: 2022-02-22 | End: 2022-02-28 | Stop reason: HOSPADM

## 2022-02-22 RX ORDER — HYDROCHLOROTHIAZIDE 25 MG/1
25 TABLET ORAL DAILY
Status: DISCONTINUED | OUTPATIENT
Start: 2022-02-22 | End: 2022-02-28 | Stop reason: HOSPADM

## 2022-02-22 RX ORDER — LOSARTAN POTASSIUM 50 MG/1
100 TABLET ORAL DAILY
Status: DISCONTINUED | OUTPATIENT
Start: 2022-02-22 | End: 2022-02-28 | Stop reason: HOSPADM

## 2022-02-22 RX ORDER — FUROSEMIDE 20 MG/1
20 TABLET ORAL DAILY
Status: DISCONTINUED | OUTPATIENT
Start: 2022-02-22 | End: 2022-02-28 | Stop reason: HOSPADM

## 2022-02-22 RX ADMIN — GABAPENTIN 300 MG: 300 CAPSULE ORAL at 15:49

## 2022-02-22 RX ADMIN — POLYETHYLENE GLYCOL 3350 17 G: 17 POWDER, FOR SOLUTION ORAL at 08:28

## 2022-02-22 RX ADMIN — SENNOSIDES AND DOCUSATE SODIUM 2 TABLET: 50; 8.6 TABLET ORAL at 19:39

## 2022-02-22 RX ADMIN — Medication 10 ML: at 19:39

## 2022-02-22 RX ADMIN — POTASSIUM CHLORIDE, DEXTROSE MONOHYDRATE AND SODIUM CHLORIDE: 150; 5; 450 INJECTION, SOLUTION INTRAVENOUS at 12:00

## 2022-02-22 RX ADMIN — HYDROCHLOROTHIAZIDE 25 MG: 25 TABLET ORAL at 12:48

## 2022-02-22 RX ADMIN — DULOXETINE HYDROCHLORIDE 20 MG: 20 CAPSULE, DELAYED RELEASE ORAL at 08:21

## 2022-02-22 RX ADMIN — Medication 250 MG: at 08:21

## 2022-02-22 RX ADMIN — ATORVASTATIN CALCIUM 20 MG: 20 TABLET, FILM COATED ORAL at 08:21

## 2022-02-22 RX ADMIN — ACETAMINOPHEN 650 MG: 325 TABLET ORAL at 19:39

## 2022-02-22 RX ADMIN — HEPARIN SODIUM 3480 UNITS: 1000 INJECTION INTRAVENOUS; SUBCUTANEOUS at 05:52

## 2022-02-22 RX ADMIN — Medication 250 MG: at 15:51

## 2022-02-22 RX ADMIN — SENNOSIDES AND DOCUSATE SODIUM 2 TABLET: 50; 8.6 TABLET ORAL at 08:21

## 2022-02-22 RX ADMIN — GABAPENTIN 300 MG: 300 CAPSULE ORAL at 19:39

## 2022-02-22 RX ADMIN — LOSARTAN POTASSIUM 100 MG: 50 TABLET, FILM COATED ORAL at 12:00

## 2022-02-22 RX ADMIN — Medication 10 ML: at 09:37

## 2022-02-22 RX ADMIN — ACETAMINOPHEN 650 MG: 325 TABLET ORAL at 09:37

## 2022-02-22 RX ADMIN — HEPARIN SODIUM 15 UNITS/KG/HR: 10000 INJECTION, SOLUTION INTRAVENOUS at 13:15

## 2022-02-22 RX ADMIN — FUROSEMIDE 20 MG: 20 TABLET ORAL at 12:00

## 2022-02-22 RX ADMIN — INSULIN LISPRO 2 UNITS: 100 INJECTION, SOLUTION INTRAVENOUS; SUBCUTANEOUS at 08:22

## 2022-02-22 RX ADMIN — Medication 20 MG: at 19:39

## 2022-02-22 RX ADMIN — HEPARIN SODIUM 15 UNITS/KG/HR: 10000 INJECTION, SOLUTION INTRAVENOUS at 05:53

## 2022-02-22 RX ADMIN — Medication 20 MG: at 09:37

## 2022-02-22 RX ADMIN — GABAPENTIN 900 MG: 300 CAPSULE ORAL at 08:21

## 2022-02-22 ASSESSMENT — PAIN DESCRIPTION - PAIN TYPE
TYPE: SURGICAL PAIN

## 2022-02-22 ASSESSMENT — PAIN SCALES - GENERAL
PAINLEVEL_OUTOF10: 4
PAINLEVEL_OUTOF10: 5
PAINLEVEL_OUTOF10: 6
PAINLEVEL_OUTOF10: 5
PAINLEVEL_OUTOF10: 4
PAINLEVEL_OUTOF10: 3
PAINLEVEL_OUTOF10: 3
PAINLEVEL_OUTOF10: 5
PAINLEVEL_OUTOF10: 4
PAINLEVEL_OUTOF10: 5
PAINLEVEL_OUTOF10: 0
PAINLEVEL_OUTOF10: 0
PAINLEVEL_OUTOF10: 6
PAINLEVEL_OUTOF10: 3
PAINLEVEL_OUTOF10: 2
PAINLEVEL_OUTOF10: 5
PAINLEVEL_OUTOF10: 5

## 2022-02-22 ASSESSMENT — PAIN DESCRIPTION - DESCRIPTORS
DESCRIPTORS: ACHING
DESCRIPTORS: ACHING
DESCRIPTORS: SORE
DESCRIPTORS: ACHING;CONSTANT
DESCRIPTORS: ACHING
DESCRIPTORS: ACHING;CONSTANT;DISCOMFORT

## 2022-02-22 ASSESSMENT — PAIN DESCRIPTION - PROGRESSION
CLINICAL_PROGRESSION: NOT CHANGED
CLINICAL_PROGRESSION: GRADUALLY WORSENING

## 2022-02-22 ASSESSMENT — PAIN DESCRIPTION - LOCATION
LOCATION: ABDOMEN
LOCATION: THROAT
LOCATION: ABDOMEN

## 2022-02-22 ASSESSMENT — PAIN DESCRIPTION - FREQUENCY
FREQUENCY: CONTINUOUS
FREQUENCY: INTERMITTENT

## 2022-02-22 ASSESSMENT — PAIN DESCRIPTION - ORIENTATION
ORIENTATION: MID

## 2022-02-22 ASSESSMENT — PAIN DESCRIPTION - ONSET
ONSET: ON-GOING

## 2022-02-22 NOTE — PROGRESS NOTES
VASCULAR SURGERY  DAILY PROGRESS NOTE    Date:2022       SQQF:[id number]/[id number]  Patient Atha Sites     YOB: 1940     Age:81 y.o. Chief Complaint:  Chief Complaint   Patient presents with    Numbness     L arm numbness, started at 1030 today, -thinners, bgl 219 per ems        Subjective:  POD3 s/p IVC filter, ex lap, mesenteric artery embolectomy, left arm embolectomy. Denies pain this morning. No acute events overnight. UOP improved    Objective:  /62   Pulse 92   Temp 98.6 °F (37 °C) (Temporal)   Resp 15   Ht 5' 1\" (1.549 m)   Wt 226 lb (102.5 kg)   SpO2 95%   BMI 42.70 kg/m²   Temp (24hrs), Av.7 °F (37.1 °C), Min:98 °F (36.7 °C), Max:99.5 °F (37.5 °C)      I/O (24Hr):  I/O last 3 completed shifts: In: 5542.3 [P.O.:120; I.V.:4372.3; IV Piggyback:1050]  Out: 9666 [Urine:2545; Emesis/NG output:550]     GENERAL:  No acute distress. Awake, alert  LUNGS:  No cough. Nonlabored breathing on BiPAP  CARDIOVASC:  Regular rate, warm. ABDOMEN:  Soft, non-distended, appropriately tender. Aquacell dressing C/D/I  EXTREMITIES: Left arm incision clean dry and intact. Palpable radial and ulnar pulses. Right DP PT and left DP PT triphasic signal. ++edema    Assessment:  78 y.o. female with DVT, PE, and emboli to the left subclavian artery and superior mesenteric artery    Plan:  -Continue heparin drip  -ECHO negative for PFO  -Hematology consulted for hypercoagulable work up, appreciate recommendations.  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

## 2022-02-22 NOTE — PROGRESS NOTES
Date: 2/21/2022    Time: 9:42 PM    Patient Placed On BIPAP/CPAP/ Non-Invasive Ventilation? Yes    If no must comment. Facial area red/color change? No           If YES are Blister/Lesion present? No   If yes must notify nursing staff  BIPAP/CPAP skin barrier?   Yes    Skin barrier type:mepilexlite       Comments:        Chad Heimlich, [facility]     02/21/22 6479   NIV Type   $NIV $Daily Charge   Skin Protection for O2 Device Yes   Location Nose   NIV Started/Stopped On   Mode Biphasic   Mask Type Full face mask   Mask Size Medium   Settings/Measurements   IPAP 10 cmH20   CPAP/EPAP 5 cmH2O   Rate Ordered 14   Resp 17   Insp Rise Time (%) 3 %   FiO2  50 %   Vt Exhaled 574 mL   Minute Volume 10.9 Liters   Mask Leak (lpm) 42 lpm   Comfort Level Good   Using Accessory Muscles No   SpO2 97   Oxygen Therapy/Pulse Ox   SpO2 95 %

## 2022-02-22 NOTE — PROGRESS NOTES
Physical Therapy  Physical Therapy Initial Assessment     Name: Jerry Cuevas  : 1940  MRN: [id number]      Date of Service: 2022    Evaluating PT:  Zachl Samantha, PT, DPT [id number]    Room #:  7477/8712-Y  Diagnosis:  Superior mesenteric artery thrombosis [address] [facility] [K55.069]  Limb ischemia [I99.8]  Left axillary artery thrombus ([address] [I74.2]  Acute saddle pulmonary embolism with acute cor pulmonale (HCC) [I26.02]  Ischemia of left upper extremity [I99.8]  Multiple pulmonary emboli (HCC) [I26.99]  PMHx/PSHx:  Arthritis, HLD, HTN, hypothyroidism, Dupuytren's contracture, lymphedema   Procedure/Surgery:  (22) Vena cava filter insertion, Exploratory laparotomy, Mesenteric artery embolectomy, left arm embolectomy, ()  tPA  Precautions:  Falls, O2, arterial line  Equipment Needs:  [employer]    SUBJECTIVE:    Pt lives alone in a 2 story home with 3 stairs to enter from garage and no rail. Bed and bathroom located on the first floor. There are 9 steps and 1 rail to get to second story. Pt ambulated with [employer] for the past week to improve safety from LBP and independent PTA. OBJECTIVE:   Initial Evaluation  Date: 22 Treatment Short Term/ Long Term   Goals   AM-PAC 6 Clicks      Was pt agreeable to Eval/treatment? Yes     Does pt have pain?  Yes, 9/10 abdominal pain with mobility      Bed Mobility  Rolling: NT  Supine to sit: MaxA x2 with HOB elevated   Sit to supine: NT  Scooting: NT  Guillermina   Transfers Sit to stand: ModA  Stand to sit: ModA  Stand pivot: ModA with [employer]  SBA with [employer]   Ambulation   A few steps forward with ModA and [employer]  >150 feet with SBA and [employer]   Stair negotiation: ascended and descended NT  >4 steps with 1 rail Guillermina   ROM BUE:  Defer to OT note  BLE:  WFL     Strength BUE:  Defer to OT note  BLE:  4/5 grossly   Increase by 1/3 MMT grade   Balance Sitting EOB:  ModA dynamically   Dynamic Standing:  ModA with [employer]  Sitting EOB:  SBA  Dynamic Standing:  SBA with [employer]     Pt is A & O x 4  CAM-ICU: NT  RASS: 0  Sensation:  No parasethsias reported   Edema:  Bilateral hands and feet/ankles    Vitals:  Heart Rate at rest 90 bpm Heart Rate post session 87 bpm   SpO2 at rest 93% SpO2 post session -%   Blood Pressure at rest 148/71 mmHg Blood Pressure post session 111/55 mmHg       Functional Status Score-Intensive Care Unit (FSS-ICU)   Rolling -/7   Supine to sit transfer 1/7   Unsupported sitting  3/7   Sit to stand transfers 3/7   Ambulation 1/7   Total  8/35       Therapeutic Exercises:  n/a    Patient education  Pt educated on safety and role of PT     Patient response to education:   Pt verbalized understanding Pt demonstrated skill Pt requires further education in this area   x x x     ASSESSMENT:    Conditions Requiring Skilled Therapeutic Intervention:    [x]Decreased strength     []Decreased ROM  [x]Decreased functional mobility  [x]Decreased balance   [x]Decreased endurance   [x]Decreased posture  []Decreased sensation  []Decreased coordination   []Decreased vision  []Decreased safety awareness   [x]Increased pain       Comments: NP reported pt was medically stable. Pt was in bed upon arrival, agreeable to initial evaluation. Pt's son was present for session and assisted with social history and PLOF over the last month. Pt was able to initiate LE movement but required physical assistance through LEs and trunk to reach EOB. Pt had a posterior lean when moving extremities outside BENEDICT. Pt required verbal cueing to sequence STS transfer and physical assistance through trunk/hips to initiate transfer from bed. Pt had forward flexed posture when completing shuffled steps to chair. Increased abdominal pain with all mobility. Pt was left in chair with all needs met and call light in reach. All lines remained intact. Discussed PT POC and prognosis with pt's son.     Treatment:  Patient practiced and was instructed in the following treatment:     Bed mobility training - pt given verbal and tactile contractures  [x] Safety and Education Training   [x] Patient/Caregiver Education   [] HEP  [] Other     PT long term treatment goals are located in above grid    Frequency of treatments: 2-5x/week x 1-2 weeks. Time in  1030  Time out  1110    Total Treatment Time  25 minutes     Evaluation Time includes thorough review of current medical information, gathering information on past medical history/social history and prior level of function, completion of standardized testing/informal observation of tasks, assessment of data and education on plan of care and goals.     CPT codes:  [] Low Complexity PT evaluation 80965  [x] Moderate Complexity PT evaluation 23243  [] High Complexity PT evaluation 69344  [] PT Re-evaluation 27462  [] Gait training 06563 - minutes  [] Manual therapy 09095 - minutes  [x] Therapeutic activities 25928 25 minutes  [] Therapeutic exercises 91239 - minutes  [] Neuromuscular reeducation 45759 - minutes     [facility], [place], [place]  [id number]

## 2022-02-22 NOTE — PROGRESS NOTES
Willye Ormond Currao 1940    SUBJECTIVE:  -Sitting up in chair at bedside with her son  -C/o of some abdominal pain   -Moving out of ICU today or tomorrow     OBJECTIVE  Physical Exam:  VITALS:  BP (!) 148/71   Pulse 86   Temp 98.5 °F (36.9 °C) (Temporal)   Resp 16   Ht 5' 1\" (1.549 m)   Wt 226 lb (102.5 kg)   SpO2 98%   BMI 42.70 kg/m²    GENERAL: Fatigued. Alert and oriented   ENT:  oropharynx clear, no evidence of mucositis. NECK:  No  lymphadenopathy. LUNGS:  Clear to auscultation   CARDIOVASCULAR:  Regular rate and rhythm. No clicks, murmurs, rubs or gallops. ABDOMEN:  Soft, tender, nondistended    NEUROLOGIC:  No focal deficits. EXTREMITIES: without clubbing, cyanosis, or edema.     DIAGNOSTIC DATA  Lab Results   Component Value Date    WBC 11.4 02/22/2022    HGB 8.3 (L) 02/22/2022    HCT 27.2 (L) 02/22/2022    MCV 81.4 02/22/2022     02/22/2022     Recent Labs     02/20/22  0500 02/21/22  0500 02/22/22  0500    136 141   CO2 22 26 26   PHOS 3.5 2.2* 2.1*   BUN 15 11 9   CREATININE 0.7 0.6 0.6     Lab Results   Component Value Date    ALT 14 02/22/2022    AST 12 02/22/2022    ALKPHOS 63 02/22/2022    BILITOT 0.4 02/22/2022     Lab Results   Component Value Date    LABALBU 2.2 (L) 02/22/2022     Current Facility-Administered Medications   Medication Dose Route Frequency Provider Last Rate Last Admin    gabapentin (NEURONTIN) capsule 300 mg  300 mg Oral TID Lyle Black MD        furosemide (LASIX) tablet 20 mg  20 mg Oral Daily Jose Mejia MD   20 mg at 02/22/22 1200    losartan (COZAAR) tablet 100 mg  100 mg Oral Daily Jose Mejia MD   100 mg at 02/22/22 1200    And    hydroCHLOROthiazide (HYDRODIURIL) tablet 25 mg  25 mg Oral Daily Jose Mejia MD        oxyCODONE (ROXICODONE) immediate release tablet 5 mg  5 mg Oral Q4H PRN Jose Mejia MD   5 mg at 02/21/22 2126    Or    oxyCODONE HCl (OXY-IR) immediate release tablet 10 mg  10 mg Oral Q4H PRN Jose Mejia MD HYDROmorphone (DILAUDID) injection 0.5 mg  0.5 mg IntraVENous Q4H PRN Addy Snow MD        acetaminophen (TYLENOL) tablet 650 mg  650 mg Oral Q4H While awake Addy Snow MD   650 mg at 02/22/22 2906    polyethylene glycol (GLYCOLAX) packet 17 g  17 g Oral Daily Addy Snow MD   17 g at 02/22/22 9929    sennosides-docusate sodium (SENOKOT-S) 8.6-50 MG tablet 2 tablet  2 tablet Oral BID Addy Snow MD   2 tablet at 02/22/22 1543    insulin lispro (HUMALOG) injection vial 0-12 Units  0-12 Units SubCUTAneous TID WC Addy Snow MD   2 Units at 02/22/22 9104    insulin lispro (HUMALOG) injection vial 0-6 Units  0-6 Units SubCUTAneous Nightly Addy Snow MD   1 Units at 02/21/22 2126    potassium & sodium phosphates (PHOS-NAK) 280-160-250 MG packet 250 mg  1 packet Oral 4x Daily Addy Snow MD   250 mg at 02/22/22 0821    dextrose 5 % and 0.45 % NaCl with KCl 20 mEq infusion   IntraVENous Continuous Addy Snow MD 50 mL/hr at 02/22/22 1200 New Bag at 02/22/22 1200    glucose (GLUTOSE) 40 % oral gel 15 g  15 g Oral PRN Jr Ken MD        dextrose 50 % IV solution  12.5 g IntraVENous PRN Jr Ken MD        glucagon (rDNA) injection 1 mg  1 mg IntraMUSCular PRN Jr Ken MD        dextrose 5 % solution  100 mL/hr IntraVENous PRN Jr Ken MD        atorvastatin (LIPITOR) tablet 20 mg  20 mg Oral Daily Jr Ken MD   20 mg at 02/22/22 7536    DULoxetine (CYMBALTA) extended release capsule 20 mg  20 mg Oral Daily Jr Ken MD   20 mg at 02/22/22 2825    heparin (porcine) injection 6,950 Units  80 Units/kg IntraVENous PRN Jon Hoyos DO        heparin (porcine) injection 3,480 Units  40 Units/kg IntraVENous PRN Jon Hoyos DO   3,480 Units at 02/22/22 0552    heparin 25,000 units in dextrose 5% 250 mL (premix) infusion  18 Units/kg/hr IntraVENous Continuous Niru Espinal DO 13 mL/hr at 02/22/22 0553 15 Units/kg/hr at 02/22/22 0553    sodium chloride flush 0.9 % injection 5-40 mL  5-40 mL IntraVENous 2 times per day Ladi Perez MD   10 mL at 02/22/22 2770    sodium chloride flush 0.9 % injection 5-40 mL  5-40 mL IntraVENous PRN Ladi Perez MD        0.9 % sodium chloride infusion  25 mL IntraVENous PRN Ladi Perez MD        perflutren lipid microspheres (DEFINITY) injection 1.65 mg  1.5 mL IntraVENous ONCE PRN Ladi Perez MD        famotidine (PEPCID) suspension 20 mg  20 mg Per NG tube BID Ladi Perez MD   20 mg at 02/22/22 0505     IMPRESSION:  Patient Active Problem List   Diagnosis    Essential hypertension, benign    Lymphedema    Osteoarthritis    Hyperlipidemia    Anxiety    Acute mesenteric arterial occlusion (HCC)    Acute saddle pulmonary embolism with acute cor pulmonale (HCC)    Occlusion of left subclavian artery    Limb ischemia    Multiple pulmonary emboli (HCC)     Impression/Plan:  78 y.o. female with Hx of HTN, DMII, lymphedema, multiple prior DVT who was treated with short term anticoagulation (Coumadin), not currently taking, who presented with bilateral massive PE, left subclavian artery thrombus, and SMA thrombus. She was started on a heparin drip and then taken to OR by Vascular for SMA embolectomy, IVC filter, and left arm embolectomy. Hypercoag work-up in process  D-Dimer 4531  Homocysteine 4.3  DRVVT Negative  Cardiolipin Beta-2 Glycoprotein pending  AT-III Activity 64% - may be low in acute setting. Protein C 83%   Protein S pending  Thrombophilia pending  Continue Heparin drip, transition to Eliquis for discharge   Continue to monitor CBC : Hb 8.3 on 02/22/2022; Transfuse if Hb <7.0  Will Consult Dr. Yves Shi to evaluate the liver lesion.  -Updated son at bedside   Will continue to follow     Thank you for allowing us to participate in the care of Mrs. Cleaning.       Dina KAT-KIMBERLI  [facility]   [phone]     The patient was seen and examined, I agree with Dina HARMON's findings, assessment and plan as outlined.      Be Rios MD   HEMATOLOGY/MEDICAL [facility]  [address] MED ONCOLOGY  [facility] [address] [address]  Dept: 71 Ronel Garcia: [phone]

## 2022-02-22 NOTE — PROGRESS NOTES
[address]       Date:2022                                                               Patient Name: Alta Randall  MRN: [id number]  : 1940  Room: [address] [place] [address]    Evaluating OT: Ida Sinclair, OTR/L 1253    Referring Provider: Leeanne Phan MD   Specific Provider Orders/Date: OT eval and treat (22)       Diagnosis:  Superior mesenteric artery thrombosis     Limb ischemia    Left axillary artery thrombus    Acute saddle pulmonary embolism with acute cor pulmonale   Ischemia of left upper extremity    Multiple pulmonary emboli      Reason for admission: stroke like symptoms; L UE numbness and weakness. 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        Frequency/Duration: 1-3 days/wk for 1-2 weeks PRN    Specific OT Treatment to include:   ADL retraining/adapted techniques and AE recommendations to increase functional independence within precautions                    Energy conservation techniques to improve tolerance for selfcare routine   Functional transfer/mobility training/DME recommendations for increased independence, safety and fall prevention         Patient/family education to increase safety and functional independence within precautions              Environmental modifications for safe mobility and completion of ADLs                           Therapeutic activity to improve functional performance during ADLs/IADLs                                         Therapeutic exercise to improve tolerance and functional strength for ADLs   Balance retraining exercises/tasks for facilitation of postural control with dynamic challenges during ADLs . Positioning to improve functional independence    Recommended Adaptive Equipment: TBA: LB Dressing AE, tub transfer bench and rail, commode rails      Home Living: Pt lives alone  in a 2 floor plan with 3 step(s) to enter through garage and no rail(s); bed & half bath on first floor; walk in shower in basement: flight with rail. Tub/shower in 2nd floor: 9 steps/1HR. Bathroom setup: pt uses walk in shower in basement with seat; standard height commode seats  Equipment owned: shower seat, rollator    Prior Level of Function: IND with ADLs;  IND with IADLs. IND for ambulation. Pt has been using rollator for past week due to difficulty ambulating (LE and back pain.)  Driving: yes    Pain Level: pt c/o 0/10 pain at rest; 9/10 abdominal pain during mobility  this session    Cognition: A&O: 4/4    Follows 1-2 step commands appropriately.     Memory: good   Comprehension good   Problem solving: fair+/good   Judgement/safety: fair+/good               Communication skills: WFL           Vision: WFL               Glasses:s/p cataract surgery- no longer wears glasses                                                   Hearing: WFL     RASS: 0  CAM-ICU: (NT) Delirium    UE Assessment:  Hand Dominance: Right [x]  Left []     ROM Strength   RUE  WFL 4/5   LUE WFL 4-/5     Sensation: No c/o numbness/tingling in  extremities. Tone: WNL   Edema: min edema L UE     Functional Assessment:  AM-PAC Daily Activity Raw Score: 13/24   Initial Eval Status  Date: 2/22/22 Treatment Status  Date: STGs = LTGs  Time frame: 7-14 days   Feeding S; set up                        Rita  while seated up in chair to increase activity tolerance        Grooming Min A  set up                        SBA   while standing sink level requiring [employer] for balance and demonstrating G tolerance      UB dressing/bathing Max A                        Min A       LB dressing/bathing Dep    Max A using AE after instruction; seated up in chair                        Min A   using AE as needed for safe reach/ energy conservation       Toileting Dep                        Min A     Bed Mobility  Supine to sit: Max A+2    Sit to supine:   NT                        Min A  in prep of ADL tasks & transfers   Functional Transfers Sit to stand: Mod A    Stand to sit:   Mod A                        Min A  sit<>stand/functional bathroom transfers using AD/DME as needed for balance and safety   Functional Mobility Mod A SPT to chair using [employer]                       Min A   functional/bathroom mobility using AD as needed & demonstrating G safety     Balance Sitting:     Static:  Min A    Dynamic:ModA/ posterior LOB  Standing: Mod A [employer]  S dynamic sitting balance; Min A dynamic standing balance  during ADL tasks & transfers   Endurance/Activity Tolerance   F tolerance with light activity. G   tolerance with moderate activity/self care routine   Visual/  Perceptual   WFL                     Vitals:   HR at rest: 90 bpm HR at end of session: 88 bpm   Spo2 at rest:93% Spo2 at end of session -%   BP at rest:133/64 mmHg BP at end of session 111/55 mmHg       Treatment: OT treatment provided this date includes:  Bed mobility: Instruction on L UE/abdominal splinting precautions for comfort prior to bed mobility to facilitate safe transfers and ADLS.  Pt required 2 person assist for safe mobility due to complexity of medical condition, medical lines and deconditioning. HOB elevated to assist.     Balance retraining: Performed sitting/standing balance ex's with instruction to facilitate righting reactions with postural changes during ADLS. Pt provided with [employer] for balance when standing. ADL retraining: Instruction on adapted techniques/AE(reacher, sock aid) to increase independence and safe reach during dressing/bathing activities. Pt demonstrated fair understanding. Pt can benefit from further instruction. Functional transfer training:  Instruction on postural cues, hand placement/sequencing, & walker safety  to assist with balance and fall prevention during self care routine/bathroom transfers. Energy conservation: Education on breathing techniques, pacing, work simplification strategies & recommended bathroom DME for safety and energy conservation during self care tasks and activities of daily living. ROM/exercise: pt instructed on L UE ROM/coordination ex's to perform bedside for edema control. Line management and environmental modifications made prior to and end of session to ensure patient safety and to increase efficiency of session. Skilled monitoring of HR, O2 saturation, blood pressure and patient's response to activity performed throughout session. Comments: OK from RN to see patient. Upon arrival, patient supine in bed, agreeable to session. Son present to assist with social history. .  Pt demo fair tolerance with fair+ understanding of education/techniques. At end of session, patient left seated in chair to increase activity tolerance. Call light within reach, all lines and tubes intact. Pt instructed on use of call light for assistance and fall prevention. .    Patient presents with decreased ROM/strength,activity tolerance, dynamic balance, functional mobility & pain limiting completion of ADLs and safety.   Pt can benefit from continued skilled OT to increase safety, functional independence and quality of life. Rehab Potential: good for established goals    Patient / Family Goal: to return to PLOF    Patient and/or family were instructed/educated on diagnosis, prognosis/goals and plan of care. Pt demonstrated G understanding. Evaluation Complexity: Moderate     · History: Expanded chart review of consults, imaging, and psychosocial history related to current functional performance. · Exam: 5+ performance deficits identified limiting functional independence and safe return home   · Assistance/Modification: Min/mod assistance or modifications required to perform tasks. May have comorbidities that affect occupational performance. [] Malnutrition indicators have been identified and nursing has been notified to ensure a dietitian consult is ordered. Time In:1040             Time Out: 1115         Total Treatment time: 20   Min Units   OT Eval Low 86774     OT Eval Medium 06409 X    OT Eval High 34596     OT Re-Eval [id number]     Therapeutic Ex 48913     Therapeutic Activities 84942 20 1   ADL/Self Care 77057     Orthotic Management 91156     Neuro Re-Ed 12493     Non-Billable Time        Evaluation time includes thorough review of current medical information, gathering information on past medical history/social history and prior level of function, completion of standardized testing/informal observation of tasks, assessment of data and development of POC/Goals.      Mau Craven, OTR/L [id number]

## 2022-02-22 NOTE — PROGRESS NOTES
Date: 2/22/2022    Time: 3:53 AM    Patient Placed On BIPAP/CPAP/ Non-Invasive Ventilation? No    If no must comment. Facial area red/color change? No           If YES are Blister/Lesion present? No   If yes must notify nursing staff  BIPAP/CPAP skin barrier? Yes    Skin barrier type:mepilexlite     Comments: remains on from previous shift    Niru Stark

## 2022-02-22 NOTE — PROGRESS NOTES
[facility]  SURGICAL INTENSIVE CARE UNIT    CRITICAL CARE ATTENDING PROGRESS NOTE    I have examined the patient, reviewed the record, and discussed the case with the APN/  Resident. I have reviewed all relevant labs and imaging data. Please refer to the  APN/ resident's note. I agree with the  assessment and plan with the following corrections/ additions. The following summarizes my clinical findings and independent assessment. CC: Critical care management after IVC filter, ex lap, mesenteric artery embolectomy, left arm embolectomy on 2/19    HOSPITAL COURSE:  2/19 IVC filter, ex lap, SMA embolectomy, left arm embolectomy  2/21 tolerating pressure support trials. Will wean to extubate  2/22 patient was extubated yesterday. She still has no bowel function. EXAM:  Awake and alert  Follows commands  2+ radial pulses bilaterally  Left arm incision clean dry intact  Sinus tach  Abdomen soft nondistended Aquacel tender Aquacel dressing clean dry intact      ASSESSMENT:  Principal Problem:    Acute mesenteric arterial occlusion (HCC)  Active Problems:    Acute saddle pulmonary embolism with acute cor pulmonale (HCC)    Occlusion of left subclavian artery    Limb ischemia    Multiple pulmonary emboli (HCC)  Resolved Problems:    * No resolved hospital problems. *       PLAN:  Sedation/ Pain: Hold sedation      Pulmonary: Acute respiratory failure resolved-- extubated on 2/21  Aggressive pulmonary hygiene    GI: N.p.o. status post ex lap  Await bowel function. Continue ice chips    FEN: Urine output improved with IV fluids. Continue Riley for strict I's and O's      Heme: Multiple pulmonary emboli, SMA occlusioncontinue heparin drip  Echo shows no PFO  Will need to transfer to oral anticoagulation once able to tolerate oral intake    Endo: Monitor Blood Sugars. Target blood glucose less than 180 in the ICU.       DVT prophylaxis--SCDS  GI Prophylaxis--pepcid  Lines--Central line right IJ 2/19  CODE: FULL     DISPOSITION-Continue ICU Care         Domitila Becerra MD, FACS  2/22/2022  11:16 AM    NOTE: This report was transcribed using voice recognition software. Every effort was made to ensure accuracy; however, inadvertent computerized transcription errors may be present.

## 2022-02-22 NOTE — PROGRESS NOTES
Subjective:  2/21/2022  78year-old woman  Seen in cardiovascular ICU earlier this morning  Registered nurse at bedside  Orally intubated  Orogastric tube draining bloody material  Oxygenating well  Awaiting to be extubated  Data reviewed in detail  2/22/2022  Patient was seen in the cardiovascular ICU earlier this morning  Extubated and off ventilator  Not requiring any supplemental oxygen  Daughter at bedside  Data reviewed in detail  Patient is fully alert  Objective:    BP (!) 148/71   Pulse 86   Temp 98.5 °F (36.9 °C) (Temporal)   Resp 16   Ht 5' 1\" (1.549 m)   Wt 226 lb (102.5 kg)   SpO2 98%   BMI 42.70 kg/m²   Fully alert and oriented  Pupils were equal  Orogastric tube out  IJ line in the right side of the neck  Trachea midline  No adenopathy  Heart:  RRR, no murmurs, gallops, or rubs.   Lungs:  CTA bilaterally, no wheeze, rales or rhonchi  Abd: bowel sounds sluggish dressing intact  Extrem:  No clubbing, cyanosis, or edema  Right arterial line is out  Left arm incision is clean and dry  Riley catheter intact /urine output better   Data reviewed in detail    Assessment:  Bilateral pulmonary emboli status post IVC filter placement postop day 3  Subclavian arterial thrombosis on the left status post embolectomy postop day 3  Mesenteric arterial thrombosis status post embolectomy postop day 3  Overall medically stable  Patient Active Problem List   Diagnosis    Essential hypertension, benign    Lymphedema    Osteoarthritis    Hyperlipidemia    Anxiety    Acute mesenteric arterial occlusion (HCC)    Acute saddle pulmonary embolism with acute cor pulmonale (HCC)    Occlusion of left subclavian artery    Limb ischemia    Multiple pulmonary emboli ([address]       Plan:  Reviewed in detail with the daughter  Questions answered to her satisfaction  Advance diet and activity  Monitor labs  Long-term anticoagulant therapy will be necessary  ICU care as outlined        Florencio Arias MD  11:30 AM  2/22/2022

## 2022-02-22 NOTE — CARE COORDINATION
Pt was extubated yesterday. Required Cpap during the night. O2 being weaned today, currently at 5L. Spoke with daughter Roslyn Daniels. She is concerned that pt may have OLIVIA and would like a sleep study ordered when medically appropriate. Discussed PTampac score. Ochoa plans to discuss rehab with her mom. Emailed [facility] jovan list to her at [email or web address]  She will notify me of choices if mom agrees once she reviews list.

## 2022-02-22 NOTE — PROGRESS NOTES
Vascular Surgery Progress Note    CC:  left subclavian artery occlusion, submassive pulmonary emboli and SMA embolus    HISTORY:  Pt s/e. Extuabted yesterday afternoon. Asking for something to drink. Denies abdominal or left arm pain. Remains on heparin. IMPRESSION:  POD # 3 s/p VCF insertion, Ex Lap, SMA embolectomy, L UE embolectomy     PLAN: Continue heparin gtt. Hypercoagulable workup per hematology.      Patient Active Problem List   Diagnosis Code    Essential hypertension, benign I10    Lymphedema I89.0    Osteoarthritis M19.90    Hyperlipidemia E78.5    Anxiety F41.9    Acute mesenteric arterial occlusion ([facility] [address] K55.069    Acute saddle pulmonary embolism with acute cor pulmonale (HCC) I26.02    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    Limb ischemia I99.8    Multiple pulmonary emboli (HCC) I26.99       Current Medications:    dextrose 5% and 0.45% NaCl with KCl 20 mEq 50 mL/hr at 02/22/22 1200    dextrose      heparin (PORCINE) Infusion 15 Units/kg/hr (02/22/22 1315)    sodium chloride        oxyCODONE **OR** oxyCODONE, HYDROmorphone, glucose, dextrose, glucagon (rDNA), dextrose, heparin (porcine), heparin (porcine), sodium chloride flush, sodium chloride, perflutren lipid microspheres    gabapentin  300 mg Oral TID    furosemide  20 mg Oral Daily    losartan  100 mg Oral Daily    And    hydroCHLOROthiazide  25 mg Oral Daily    acetaminophen  650 mg Oral Q4H While awake    polyethylene glycol  17 g Oral Daily    sennosides-docusate sodium  2 tablet Oral BID    insulin lispro  0-12 Units SubCUTAneous TID WC    insulin lispro  0-6 Units SubCUTAneous Nightly    potassium & sodium phosphates  1 packet Oral 4x Daily    atorvastatin  20 mg Oral Daily    DULoxetine  20 mg Oral Daily    sodium chloride flush  5-40 mL IntraVENous 2 times per day    famotidine  20 mg Per NG tube BID          PHYSICAL EXAM:    Vitals:    02/22/22 1400   BP: (!) 112/59   Pulse: 85   Resp: 12   Temp: SpO2: 97%     CONSTITUTIONAL:  awake, alert, cooperative, no apparent distress  LUNGS:  Easy, nonlabored on 5L NC  CARDIOVASCULAR: regular rate and rhythm  ABDOMEN:  soft, non-distended and tender to palpation along midline incision. Aquacel dressing in place with no strike through noted  R UE: 2+ radial, 5/5 hand  strength  L UE: upper arm incision well approximated, slight ecchymosis 2+ radial. 5/5 hand  strength  R LE: foot, warm, well perfused. + DP/PT signal  L LE: foot warm, well perfused.  2+ DP, + PT signal    LABS:    Lab Results   Component Value Date    WBC 11.4 02/22/2022    HGB 8.3 (L) 02/22/2022    HCT 27.2 (L) 02/22/2022     02/22/2022    PROTIME 12.1 02/19/2022    INR 1.1 02/19/2022    APTT 65.2 (H) 02/22/2022    K 3.9 02/22/2022    BUN 9 02/22/2022    CREATININE 0.6 02/22/2022       RADIOLOGY:

## 2022-02-22 NOTE — PROGRESS NOTES
Surgical Intensive Care Unit  Daily Progress Note  Date: 2/22/2022        Patient Name: Kianna Gallo     YOB: 1940      Age:  78 y.o. Gender: female    Chief complaint:  Chief Complaint   Patient presents with    Numbness     L arm numbness, started at 1030 today, -thinners, bgl 219 per ems        Date of admission:  2/19/2022  LOS: 3  Dates in ICU: 2/19-now  Reason for ICU:  Vascular risk  Hospital course:    2/19 emergent exlap SMA embolectomy & L subclavian embolectomy, CVICU  2/20 continue heparin drip. Prerenal FeNa  2/21 extubated. UOP normalized with bolus      Subjective:  Still no bowel function. No n/v. Somewhat hungry. Physical Exam:  BP (!) 125/59   Pulse 95   Temp 98.6 °F (37 °C) (Temporal)   Resp 16   Ht 5' 1\" (1.549 m)   Wt 226 lb (102.5 kg)   SpO2 95%   BMI 42.70 kg/m²   AFVSS  bipap overnight    I/O last 3 completed shifts: In: 5542.3 [P.O.:120; I.V.:4372.3; IV Piggyback:1050]  Out: 7332 [Urine:2545; Emesis/NG output:550]    Vent Settings: Additional Respiratory  Assessments  Pulse: 95  Resp: 16  SpO2: 95 %  Position: Semi-Mejia's  Humidification Source: Heated wire  Humidification Temp: 37  Oral Care: Mouth swabbed  Subglottic Suction Done?: No    GENERAL: NAD, awake and conversational  NEURO: MAEx4, pupils equal  HEAD: Normocephalic, atraumatic  NECK: Trachea midline, mucous membranes dry  CARDIO-VASCULAR: normal rate, no cyanosis  RESPIRATORY: Nonlabored breathing, Bipap, CTAB  GENITOURINARY:  Riley with yellow urine  ABDOMEN / GI: Soft, obese but non-distended, appropriately-tender. Incision c/d/i with minimal strikethru. SKIN: Warm, dry  MSK: No deformities, trace edema. L radial pulse strong. R fem puncture site c/d/i without hematoma. BLE good ankle motion and pink. Bilateral DPPT multiphasic.        LABS:  ABG   Recent Labs     02/21/22  0403   PH 7.402   PCO2 40.0   PO2 88.6   HCO3 24.3   BE -0.4   O2SAT 96.3       BMP/Lytes  Recent Labs 22  0500         CO2 26   BUN 11   CREATININE 0.6     Recent Labs     22  0500   K 4.0   MG 1.8   PHOS 2.2*   CALCIUM 7.6*     Liver Function  Recent Labs     22  1239 22  1350 22  0500   ALKPHOS  --    < > 53   ALT  --    < > 18   AST  --    < > 11   BILITOT  --    < > 0.5   PROT  --    < > 4.9*   LABALBU  --    < > 2.5*   INR 1.1  --   --     < > = values in this interval not displayed. CBC/coags  Recent Labs     22  0500   WBC 11.4   HGB 8.3*   HCT 27.2*        Recent Labs     22  1239   INR 1.1     Other  No results for input(s): LACTATE, PROCAL, CORTISOL, TSH, CK, BNP in the last 72 hours. Invalid input(s): TROPONIN      IMAGIN/20 echo: no PFO, no atrial thrombus      Problem List:   Hospital Problems           Last Modified POA    * (Principal) Acute mesenteric arterial occlusion ([address] 2022 Yes    Acute saddle pulmonary embolism with acute cor pulmonale ([address] 2022 Yes    Occlusion of left subclavian artery 2022 Yes    Limb ischemia 2022 Yes    Multiple pulmonary emboli ([address] 2022 Yes            ASSESSMENT / PLAN:    Neuro:    Acute Pain - tylenol, prn oxy  Chronic back pain - home gabapentin 900 TID    Home cymbalta    CV:  Multiple clots (SMA, L subclavian, PE, prior DVT)   - s/p exlap SMA embolectomy and LUE embolectomy   - echo w/o PFO/thromb  - heparin drip  - H/O for thrombophilia workup  - vascular checks    HTN - resume home lasix & losartan/HCTZ today. Cont home lipitor.     Pulm:   Sleep apnea - bipap when sleeping    Renal:  low mIVF, home lasix/losartan/HCTZ    GI:    Once return of bowel function, will start clears and then diabetic cardiac diet    Heme:  ABLA due to surgery - stabilizing, monitor daily  Thrombophilia workup per H/O    ID:  Reactive leukocytosis resolved - No indication for abx    Endo:   Pre-diabetes - ISS    MSK:  Deconditioning due to illness - PTOT    Bowel reg:  Glycolax, docsenna  DVT ppx:  SCDs, hep gtt  GI ppx:  n/a  Seizure proph:  n/a  Mouth/eye care: n/a  Riley:  Remove today  Central lines: R IJ CVC 2/19 (remove when off hep gtt for lab draws)  Family Update: As available   CODE Status:  Full code     Dispo:  ICU    Please see attending note for corrections/updates.     Electronically signed by Adrienne Iglesias MD on 2/22/2022 at 6:16 AM

## 2022-02-23 ENCOUNTER — APPOINTMENT (OUTPATIENT)
Dept: CT IMAGING | Age: 82
DRG: 270 | End: 2022-02-23
Payer: MEDICARE

## 2022-02-23 LAB
ALBUMIN SERPL-MCNC: 2.4 G/DL (ref 3.5–5.2)
ALP BLD-CCNC: 86 U/L (ref 35–104)
ALT SERPL-CCNC: 15 U/L (ref 0–32)
ANION GAP SERPL CALCULATED.3IONS-SCNC: 6 MMOL/L (ref 7–16)
APTT: 42.8 SEC (ref 24.5–35.1)
APTT: 76.4 SEC (ref 24.5–35.1)
AST SERPL-CCNC: 14 U/L (ref 0–31)
BASOPHILS ABSOLUTE: 0.04 E9/L (ref 0–0.2)
BASOPHILS RELATIVE PERCENT: 0.4 % (ref 0–2)
BILIRUB SERPL-MCNC: 0.4 MG/DL (ref 0–1.2)
BLOOD BANK DISPENSE STATUS: NORMAL
BLOOD BANK PRODUCT CODE: NORMAL
BPU ID: NORMAL
BUN BLDV-MCNC: 10 MG/DL (ref 6–23)
CALCIUM IONIZED: 1.22 MMOL/L (ref 1.15–1.33)
CALCIUM SERPL-MCNC: 8.2 MG/DL (ref 8.6–10.2)
CHLORIDE BLD-SCNC: 101 MMOL/L (ref 98–107)
CO2: 32 MMOL/L (ref 22–29)
CREAT SERPL-MCNC: 0.6 MG/DL (ref 0.5–1)
DESCRIPTION BLOOD BANK: NORMAL
EOSINOPHILS ABSOLUTE: 0.35 E9/L (ref 0.05–0.5)
EOSINOPHILS RELATIVE PERCENT: 3.2 % (ref 0–6)
GFR AFRICAN AMERICAN: >60
GFR NON-AFRICAN AMERICAN: >60 ML/MIN/1.73
GLUCOSE BLD-MCNC: 113 MG/DL (ref 74–99)
HCT VFR BLD CALC: 27.2 % (ref 34–48)
HEMOGLOBIN: 8.2 G/DL (ref 11.5–15.5)
IMMATURE GRANULOCYTES #: 0.08 E9/L
IMMATURE GRANULOCYTES %: 0.7 % (ref 0–5)
LYMPHOCYTES ABSOLUTE: 2.18 E9/L (ref 1.5–4)
LYMPHOCYTES RELATIVE PERCENT: 19.9 % (ref 20–42)
MAGNESIUM: 2.2 MG/DL (ref 1.6–2.6)
MCH RBC QN AUTO: 25 PG (ref 26–35)
MCHC RBC AUTO-ENTMCNC: 30.1 % (ref 32–34.5)
MCV RBC AUTO: 82.9 FL (ref 80–99.9)
METER GLUCOSE: 111 MG/DL (ref 74–99)
METER GLUCOSE: 135 MG/DL (ref 74–99)
METER GLUCOSE: 142 MG/DL (ref 74–99)
METER GLUCOSE: 161 MG/DL (ref 74–99)
METER GLUCOSE: 97 MG/DL (ref 74–99)
MONOCYTES ABSOLUTE: 0.66 E9/L (ref 0.1–0.95)
MONOCYTES RELATIVE PERCENT: 6 % (ref 2–12)
NEUTROPHILS ABSOLUTE: 7.64 E9/L (ref 1.8–7.3)
NEUTROPHILS RELATIVE PERCENT: 69.8 % (ref 43–80)
PDW BLD-RTO: 14.8 FL (ref 11.5–15)
PHOSPHORUS: 2.6 MG/DL (ref 2.5–4.5)
PLATELET # BLD: 251 E9/L (ref 130–450)
PMV BLD AUTO: 10.2 FL (ref 7–12)
POTASSIUM SERPL-SCNC: 3.8 MMOL/L (ref 3.5–5)
RBC # BLD: 3.28 E12/L (ref 3.5–5.5)
SODIUM BLD-SCNC: 139 MMOL/L (ref 132–146)
TOTAL PROTEIN: 4.9 G/DL (ref 6.4–8.3)
WBC # BLD: 11 E9/L (ref 4.5–11.5)

## 2022-02-23 PROCEDURE — 6370000000 HC RX 637 (ALT 250 FOR IP): Performed by: INTERNAL MEDICINE

## 2022-02-23 PROCEDURE — 36415 COLL VENOUS BLD VENIPUNCTURE: CPT

## 2022-02-23 PROCEDURE — 74175 CTA ABDOMEN W/CONTRAST: CPT

## 2022-02-23 PROCEDURE — 36592 COLLECT BLOOD FROM PICC: CPT

## 2022-02-23 PROCEDURE — 85730 THROMBOPLASTIN TIME PARTIAL: CPT

## 2022-02-23 PROCEDURE — 99233 SBSQ HOSP IP/OBS HIGH 50: CPT | Performed by: INTERNAL MEDICINE

## 2022-02-23 PROCEDURE — 85025 COMPLETE CBC W/AUTO DIFF WBC: CPT

## 2022-02-23 PROCEDURE — 6370000000 HC RX 637 (ALT 250 FOR IP): Performed by: SURGERY

## 2022-02-23 PROCEDURE — 94660 CPAP INITIATION&MGMT: CPT

## 2022-02-23 PROCEDURE — 2000000000 HC ICU R&B

## 2022-02-23 PROCEDURE — 2700000000 HC OXYGEN THERAPY PER DAY

## 2022-02-23 PROCEDURE — 6370000000 HC RX 637 (ALT 250 FOR IP): Performed by: STUDENT IN AN ORGANIZED HEALTH CARE EDUCATION/TRAINING PROGRAM

## 2022-02-23 PROCEDURE — 6360000002 HC RX W HCPCS: Performed by: EMERGENCY MEDICINE

## 2022-02-23 PROCEDURE — 82330 ASSAY OF CALCIUM: CPT

## 2022-02-23 PROCEDURE — 6360000004 HC RX CONTRAST MEDICATION: Performed by: RADIOLOGY

## 2022-02-23 PROCEDURE — 80053 COMPREHEN METABOLIC PANEL: CPT

## 2022-02-23 PROCEDURE — 83735 ASSAY OF MAGNESIUM: CPT

## 2022-02-23 PROCEDURE — 2580000003 HC RX 258: Performed by: STUDENT IN AN ORGANIZED HEALTH CARE EDUCATION/TRAINING PROGRAM

## 2022-02-23 PROCEDURE — 99222 1ST HOSP IP/OBS MODERATE 55: CPT | Performed by: TRANSPLANT SURGERY

## 2022-02-23 PROCEDURE — 82105 ALPHA-FETOPROTEIN SERUM: CPT

## 2022-02-23 PROCEDURE — 82962 GLUCOSE BLOOD TEST: CPT

## 2022-02-23 PROCEDURE — 84100 ASSAY OF PHOSPHORUS: CPT

## 2022-02-23 RX ORDER — SODIUM CHLORIDE 0.9 % (FLUSH) 0.9 %
10 SYRINGE (ML) INJECTION
Status: ACTIVE | OUTPATIENT
Start: 2022-02-23 | End: 2022-02-23

## 2022-02-23 RX ADMIN — ACETAMINOPHEN 650 MG: 325 TABLET ORAL at 10:07

## 2022-02-23 RX ADMIN — SENNOSIDES AND DOCUSATE SODIUM 2 TABLET: 50; 8.6 TABLET ORAL at 09:09

## 2022-02-23 RX ADMIN — HYDROCHLOROTHIAZIDE 25 MG: 25 TABLET ORAL at 09:10

## 2022-02-23 RX ADMIN — GABAPENTIN 300 MG: 300 CAPSULE ORAL at 13:33

## 2022-02-23 RX ADMIN — IOPAMIDOL 90 ML: 755 INJECTION, SOLUTION INTRAVENOUS at 08:12

## 2022-02-23 RX ADMIN — FUROSEMIDE 20 MG: 20 TABLET ORAL at 09:09

## 2022-02-23 RX ADMIN — Medication 1 PACKET: at 17:01

## 2022-02-23 RX ADMIN — INSULIN LISPRO 2 UNITS: 100 INJECTION, SOLUTION INTRAVENOUS; SUBCUTANEOUS at 12:07

## 2022-02-23 RX ADMIN — Medication 1 PACKET: at 13:05

## 2022-02-23 RX ADMIN — GABAPENTIN 300 MG: 300 CAPSULE ORAL at 19:49

## 2022-02-23 RX ADMIN — Medication 10 ML: at 09:09

## 2022-02-23 RX ADMIN — DULOXETINE HYDROCHLORIDE 20 MG: 20 CAPSULE, DELAYED RELEASE ORAL at 09:13

## 2022-02-23 RX ADMIN — ACETAMINOPHEN 650 MG: 325 TABLET ORAL at 21:56

## 2022-02-23 RX ADMIN — Medication 250 MG: at 19:49

## 2022-02-23 RX ADMIN — ACETAMINOPHEN 650 MG: 325 TABLET ORAL at 05:42

## 2022-02-23 RX ADMIN — Medication 10 ML: at 19:49

## 2022-02-23 RX ADMIN — LOSARTAN POTASSIUM 100 MG: 50 TABLET, FILM COATED ORAL at 09:10

## 2022-02-23 RX ADMIN — ATORVASTATIN CALCIUM 20 MG: 20 TABLET, FILM COATED ORAL at 09:10

## 2022-02-23 RX ADMIN — Medication 1 PACKET: at 09:10

## 2022-02-23 RX ADMIN — POLYETHYLENE GLYCOL 3350 17 G: 17 POWDER, FOR SOLUTION ORAL at 09:10

## 2022-02-23 RX ADMIN — GABAPENTIN 300 MG: 300 CAPSULE ORAL at 09:09

## 2022-02-23 RX ADMIN — HEPARIN SODIUM 3480 UNITS: 1000 INJECTION INTRAVENOUS; SUBCUTANEOUS at 06:44

## 2022-02-23 RX ADMIN — ACETAMINOPHEN 650 MG: 325 TABLET ORAL at 18:12

## 2022-02-23 RX ADMIN — HEPARIN SODIUM 17 UNITS/KG/HR: 10000 INJECTION, SOLUTION INTRAVENOUS at 06:43

## 2022-02-23 RX ADMIN — ACETAMINOPHEN 650 MG: 325 TABLET ORAL at 13:33

## 2022-02-23 ASSESSMENT — ENCOUNTER SYMPTOMS
ABDOMINAL DISTENTION: 0
SINUS PAIN: 0
EYE PAIN: 0
DIARRHEA: 0
CHEST TIGHTNESS: 0
ALLERGIC/IMMUNOLOGIC NEGATIVE: 1
COLOR CHANGE: 0
SHORTNESS OF BREATH: 0
CONSTIPATION: 0
ABDOMINAL PAIN: 0
RHINORRHEA: 0
BACK PAIN: 0
NAUSEA: 0
VOMITING: 0

## 2022-02-23 ASSESSMENT — PAIN SCALES - GENERAL
PAINLEVEL_OUTOF10: 5
PAINLEVEL_OUTOF10: 0
PAINLEVEL_OUTOF10: 4
PAINLEVEL_OUTOF10: 0
PAINLEVEL_OUTOF10: 4
PAINLEVEL_OUTOF10: 0
PAINLEVEL_OUTOF10: 0
PAINLEVEL_OUTOF10: 3
PAINLEVEL_OUTOF10: 0
PAINLEVEL_OUTOF10: 2
PAINLEVEL_OUTOF10: 0
PAINLEVEL_OUTOF10: 0
PAINLEVEL_OUTOF10: 4
PAINLEVEL_OUTOF10: 2
PAINLEVEL_OUTOF10: 0
PAINLEVEL_OUTOF10: 5
PAINLEVEL_OUTOF10: 2
PAINLEVEL_OUTOF10: 3
PAINLEVEL_OUTOF10: 0
PAINLEVEL_OUTOF10: 0
PAINLEVEL_OUTOF10: 5
PAINLEVEL_OUTOF10: 1
PAINLEVEL_OUTOF10: 5
PAINLEVEL_OUTOF10: 0

## 2022-02-23 ASSESSMENT — PAIN DESCRIPTION - LOCATION: LOCATION: THROAT

## 2022-02-23 ASSESSMENT — PAIN DESCRIPTION - ONSET: ONSET: ON-GOING

## 2022-02-23 ASSESSMENT — PAIN DESCRIPTION - PAIN TYPE
TYPE: SURGICAL PAIN

## 2022-02-23 ASSESSMENT — PAIN - FUNCTIONAL ASSESSMENT: PAIN_FUNCTIONAL_ASSESSMENT: PREVENTS OR INTERFERES SOME ACTIVE ACTIVITIES AND ADLS

## 2022-02-23 ASSESSMENT — PAIN DESCRIPTION - PROGRESSION
CLINICAL_PROGRESSION: GRADUALLY WORSENING

## 2022-02-23 ASSESSMENT — PAIN DESCRIPTION - DESCRIPTORS: DESCRIPTORS: SORE

## 2022-02-23 ASSESSMENT — PAIN DESCRIPTION - FREQUENCY: FREQUENCY: CONTINUOUS

## 2022-02-23 ASSESSMENT — PAIN DESCRIPTION - ORIENTATION: ORIENTATION: MID

## 2022-02-23 NOTE — PROGRESS NOTES
-0.4   O2SAT 96.3       BMP/Lytes  Recent Labs     22  0500         CO2 26   BUN 9   CREATININE 0.6     Recent Labs     22  0500   K 3.9   MG 2.2   PHOS 2.1*   CALCIUM 7.8*     Liver Function  Recent Labs     22  0500   ALKPHOS 63   ALT 14   AST 12   BILITOT 0.4   PROT 4.6*   LABALBU 2.2*     CBC/coags  Recent Labs     22  0500   WBC 11.0   HGB 8.2*   HCT 27.2*        No results for input(s): INR, PTT in the last 72 hours. Invalid input(s): PT  Other  No results for input(s): LACTATE, PROCAL, CORTISOL, TSH, CK, BNP in the last 72 hours. Invalid input(s): TROPONIN      IMAGIN/20 echo: no PFO, no atrial thrombus      Problem List:   Hospital Problems           Last Modified POA    * (Principal) Acute mesenteric arterial occlusion ([address] 2022 Yes    Acute saddle pulmonary embolism with acute cor pulmonale ([address] 2022 Yes    Occlusion of left subclavian artery 2022 Yes    Limb ischemia 2022 Yes    Multiple pulmonary emboli ([address] 2022 Yes            ASSESSMENT / PLAN:    Neuro:    Acute Pain - tylenol, prn oxy  Chronic back pain - home gabapentin 900 TID    Home cymbalta    CV:  Multiple clots (SMA, L subclavian, PE, prior DVT)   - s/p exlap SMA embolectomy and LUE embolectomy   - echo w/o PFO/thromb  - heparin drip  - H/O for thrombophilia workup  - vascular checks    HTN - home lasix & losartan/HCTZ & lipitor.     Pulm:   Sleep apnea - bipap when sleeping    Renal:  low mIVF, home lasix/losartan/HCTZ    GI:    Start clears and then await bowel function for advance to diabetic cardiac diet    Heme:  ABLA due to surgery - stabilizing, monitor daily  Thrombophilia workup per H/O    ID:  Reactive leukocytosis resolved - No indication for abx    Endo:   Pre-diabetes - ISS    MSK:  Deconditioning due to illness - PTOT    Bowel reg:  Glycolax, docsenna  DVT ppx:  SCDs, hep gtt  GI ppx:  n/a  Seizure proph:  n/a  Mouth/eye care: n/a  Riley:  Remove today  Central lines: R IJ CVC 2/19 (remove when off hep gtt for lab draws)  Family Update: As available   CODE Status:  Full code     Dispo:  ICU - critical care will sign off, please call as needed    Please see attending note for corrections/updates.     Electronically signed by Adelina Price MD on 2/23/2022 at 6:13 AM

## 2022-02-23 NOTE — PLAN OF CARE
Problem: Skin Integrity:  Goal: Will show no infection signs and symptoms  Description: Will show no infection signs and symptoms  2/23/2022 1021 by Yo Baca RN  Outcome: Met This Shift  2/22/2022 2041 by Stanley Balderas RN  Outcome: Met This Shift  Goal: Absence of new skin breakdown  Description: Absence of new skin breakdown  2/22/2022 2041 by Stanley Balderas RN  Outcome: Met This Shift     Problem: Falls - Risk of:  Goal: Will remain free from falls  Description: Will remain free from falls  2/23/2022 1021 by Yo Baca RN  Outcome: Met This Shift  2/22/2022 2041 by Stanley Balderas RN  Outcome: Met This Shift  Goal: Absence of physical injury  Description: Absence of physical injury  2/22/2022 2041 by Stanley Balderas RN  Outcome: Met This Shift     Problem: Pain:  Goal: Pain level will decrease  Description: Pain level will decrease  2/22/2022 2041 by Stanley Balderas RN  Outcome: Met This Shift     Problem: Musculor/Skeletal Functional Status  Goal: Absence of falls  2/23/2022 1021 by Yo Baca RN  Outcome: Met This Shift  2/22/2022 2041 by Stanley Balderas RN  Outcome: Met This Shift     Problem: Tissue Perfusion:  Goal: Peripheral tissue perfusion will improve  Description: Peripheral tissue perfusion will improve  Outcome: Met This Shift

## 2022-02-23 NOTE — CONSULTS
Hepatobiliary and Pancreatic Surgery Resident History and Physical    Patient's Name/Date of Birth: Hector Mancera /1940 (89 y.o.)    Date: February 23, 2022     CC: Liver Mass    HPI:  This is a 78 y. o. female with PMHx HTN, DM, lymphedema, multiple prior DVT who was treated with short term anticoagulation, not currently taking, who presented to the ED after she had acute onset numbness, tingling and loss of strength in the left arm this morning. Patient's daughter brought her to the emergency room for further evaluation. It has since regained strength, but she is still having numbness and tingling. She was initially worked up for a potential stroke but then on imaging was actually found to have bilateral massive PE, left subclavian artery occlusion, and SMA thrombus. She had no abdominal pain. She was started on a heparin drip and then taken to OR by Vascular for SMA embolectomy, IVC filter, and left arm embolectomy. She was kept intubated postop, but has since been extubated.     Of note patient and family state that for the past month, patient has been increasingly sedentary due to persistent back pain. She has also had a recent ultrasound of her right leg at [facility] due to some pain and concern for DVT, which was negative. She currently denies any abdominal pain, nausea, vomiting, GERD, melena, hematochezia, fevers, chills, night sweats, or unintentional weight loss. She previously worked as a seamstress. She has never had a colonoscopy.     Past Medical History:   Diagnosis Date    Anxiety     Arthritis     osteoarthritis    Depression     Diverticulitis     Dupuytren's contracture     Hyperlipidemia     Hypertension     Hypothyroidism     Lymphedema     Pre-diabetes        Past Surgical History:   Procedure Laterality Date    ABDOMINAL AORTIC ANEURYSM REPAIR N/A 2/19/2022    EXPLORATORY LAPAROTOMY, MESENTERIC ARTERY EMBOLECTOMY performed by Harshal Burkett MD at [place] [address] EMBOLECTOMY Left 2/19/2022    LEFT ARM  EMBOLECTOMY performed by To Monae MD at [facility] Right 2/19/2022    VENA CAVA FILTER INSERTION performed by To Monae MD at [address]       Current Facility-Administered Medications   Medication Dose Route Frequency Provider Last Rate Last Admin    gabapentin (NEURONTIN) capsule 300 mg  300 mg Oral TID Hector Urias MD   300 mg at 02/22/22 1939    furosemide (LASIX) tablet 20 mg  20 mg Oral Daily Jerson Garcia MD   20 mg at 02/22/22 1200    losartan (COZAAR) tablet 100 mg  100 mg Oral Daily Jerson Garcia MD   100 mg at 02/22/22 1200    And    hydroCHLOROthiazide (HYDRODIURIL) tablet 25 mg  25 mg Oral Daily Jerson Garcia MD   25 mg at 02/22/22 1248    oxyCODONE (ROXICODONE) immediate release tablet 5 mg  5 mg Oral Q4H PRN Jerson Garcia MD   5 mg at 02/21/22 2126    Or    oxyCODONE HCl (OXY-IR) immediate release tablet 10 mg  10 mg Oral Q4H PRN Jerson Garcia MD        HYDROmorphone (DILAUDID) injection 0.5 mg  0.5 mg IntraVENous Q4H PRN Jerson Garcia MD        acetaminophen (TYLENOL) tablet 650 mg  650 mg Oral Q4H While awake Jerson Garcia MD   650 mg at 02/23/22 0542    polyethylene glycol (GLYCOLAX) packet 17 g  17 g Oral Daily Jerson Garcia MD   17 g at 02/22/22 2000    sennosides-docusate sodium (SENOKOT-S) 8.6-50 MG tablet 2 tablet  2 tablet Oral BID Jerson Garcia MD   2 tablet at 02/22/22 1939    insulin lispro (HUMALOG) injection vial 0-12 Units  0-12 Units SubCUTAneous TID WC Jerson Garcia MD   2 Units at 02/22/22 8251    insulin lispro (HUMALOG) injection vial 0-6 Units  0-6 Units SubCUTAneous Nightly Jerson Garcia MD   1 Units at 02/21/22 2126    dextrose 5 % and 0.45 % NaCl with KCl 20 mEq infusion   IntraVENous Continuous Jerson Garcia MD 50 mL/hr at 02/22/22 1200 New Bag at 02/22/22 1200    glucose (GLUTOSE) 40 % oral gel 15 g  15 g Oral PRN Tasha Lindo MD        dextrose 50 % IV solution  12.5 g IntraVENous Living Expenses: Not on file   Food Insecurity:     Worried About [facility] in the Last Year: Not on file    Irma of Food in the Last Year: Not on file   Transportation Needs:     Lack of Transportation (Medical): Not on file    Lack of Transportation (Non-Medical): Not on file   Physical Activity:     Days of Exercise per Week: Not on file    Minutes of Exercise per Session: Not on file   Stress:     Feeling of Stress : Not on file   Social Connections:     Frequency of Communication with Friends and Family: Not on file    Frequency of Social Gatherings with Friends and Family: Not on file    Attends Sabianist Services: Not on file    Active Member of [address] [place] [address] or Organizations: Not on file    Attends Club or Organization Meetings: Not on file    Marital Status: Not on file   Intimate Partner Violence:     Fear of Current or Ex-Partner: Not on file    Emotionally Abused: Not on file    Physically Abused: Not on file    Sexually Abused: Not on file   Housing Stability:     Unable to Pay for Housing in the Last Year: Not on file    Number of [place] in the Last Year: Not on file    Unstable Housing in the Last Year: Not on file       ROS:   Review of Systems   Constitutional: Negative for appetite change, chills, fever and unexpected weight change. HENT: Negative for ear pain, rhinorrhea and sinus pain. Eyes: Negative for pain. Respiratory: Negative for chest tightness and shortness of breath. Cardiovascular: Negative for chest pain. Gastrointestinal: Negative for abdominal distention, abdominal pain, constipation, diarrhea, nausea and vomiting. Endocrine: Negative for heat intolerance and polyphagia. Genitourinary: Negative for dysuria and flank pain. Musculoskeletal: Negative for back pain. Skin: Negative for color change and rash. Allergic/Immunologic: Negative. Neurological: Negative for light-headedness and headaches. Hematological: Negative. Psychiatric/Behavioral: Negative. Physical Exam:  Vitals:    02/23/22 0700   BP: (!) 116/54   Pulse: 90   Resp: 18   Temp:    SpO2: 94%       PSYCH: mood and affect normal, alert and oriented x 3: No apparent distress, comfortable  EYES: Sclera white, pupils equal round and reactive to light  ENMT:  Hearing normal, trachea midline  LYMPH: no obvious lympadenopathy in neck.    RESP: On 5L NC  CV:  No pedal edema  GI/ Abdomen: Soft, nondistended, nontender, midline dressing with minimal strikethrough  MSK: no clubbing/ no cyanosis/ gaitnormal    Assessment/Plan:  Claudell Fleck is a 78year old female s/p ex-lap SMA embolectomy, IVC filter, and LUE embolectomy 2/19 who was found to have an incidental 6cm hypodense liver lesion in the right hepatic lobe    - Okay for diet from [facility] surgery POV  - Pain control per ICU  - Will obtain CTA triphasic of the liver to further delineate and characterize - appears to be cystic in nature    Electronically signed by Salty York MD on 2/23/2022 at 4:31 PM

## 2022-02-23 NOTE — PROGRESS NOTES
Date: 2/22/2022    Time: 10:30 PM    Patient Placed On BIPAP/CPAP/ Non-Invasive Ventilation? Yes    If no must comment. Facial area red/color change? No           If YES are Blister/Lesion present? No   If yes must notify nursing staff  BIPAP/CPAP skin barrier?   Yes    Skin barrier type:mepilexlite       Comments:        Charisma Hall RCP

## 2022-02-23 NOTE — CARE COORDINATION
Pod #4 s/p SMA embolectomy, IVC filter, and LUE embolectomy. O2 6L , used cpap at hs. Heparin drip cont's. Hypercoagulable w/u in progress with hematology. Found to have an incidental 6cm hypodense liver lesion in the right hepatic lobe. Hepatobiliary/Pancreatic Surgery consulted. CTA triphasic liver done today. Dc plan will likely be Jovan, if pt agrees after daughter talks to her. Ochoa. Will notify me with jovan choices when made.

## 2022-02-23 NOTE — PROGRESS NOTES
Vascular Surgery Progress Note    CC:  left subclavian artery occlusion, submassive pulmonary emboli and SMA embolus    HISTORY:  Pt s/e. Denies abdominal or left arm pain. + flatus this AM. Feeling better. Family at bedside. Remains on heparin. IMPRESSION:  POD # 4 s/p VCF insertion, Ex Lap, SMA embolectomy, L UE embolectomy     PLAN: Vascular exam stable. Advance diet. PT/OT. Continue heparin gtt. Hypercoagulable workup per hematology. Agree. We will plan to start oral anticoagulation tomorrow if patient continues to tolerate diet.     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    Limb ischemia I99.8    Multiple pulmonary emboli (HCC) I26.99       Current Medications:    dextrose 5% and 0.45% NaCl with KCl 20 mEq 50 mL/hr at 02/22/22 1200    dextrose      heparin (PORCINE) Infusion 17 Units/kg/hr (02/23/22 0643)    sodium chloride        sodium chloride flush, oxyCODONE **OR** oxyCODONE, HYDROmorphone, glucose, dextrose, glucagon (rDNA), dextrose, heparin (porcine), heparin (porcine), sodium chloride flush, sodium chloride, perflutren lipid microspheres    potassium & sodium phosphates  1 packet Oral 4x Daily    gabapentin  300 mg Oral TID    furosemide  20 mg Oral Daily    losartan  100 mg Oral Daily    And    hydroCHLOROthiazide  25 mg Oral Daily    acetaminophen  650 mg Oral Q4H While awake    polyethylene glycol  17 g Oral Daily    sennosides-docusate sodium  2 tablet Oral BID    insulin lispro  0-12 Units SubCUTAneous TID WC    insulin lispro  0-6 Units SubCUTAneous Nightly    atorvastatin  20 mg Oral Daily    DULoxetine  20 mg Oral Daily    sodium chloride flush  5-40 mL IntraVENous 2 times per day          PHYSICAL EXAM:    Vitals:    02/23/22 0800   BP: (!) 113/58   Pulse: 93   Resp: 17   Temp:    SpO2: 96%     CONSTITUTIONAL:  awake, alert, cooperative, no apparent distress  LUNGS:  Easy, nonlabored on 5L NC  CARDIOVASCULAR: regular rate and rhythm  ABDOMEN:  soft, non-distended and tender to palpation along midline incision. Aquacel dressing in place with no strike through noted  R UE: 3+ radial, 5/5 hand  strength  L UE: upper arm incision well approximated, slight ecchymosis 3+ radial. 5/5 hand  strength  R LE: foot, warm, well perfused. + DP/PT signal  L LE: foot warm, well perfused.  2+ DP, + PT signal    LABS:    Lab Results   Component Value Date    WBC 11.0 02/23/2022    HGB 8.2 (L) 02/23/2022    HCT 27.2 (L) 02/23/2022     02/23/2022    PROTIME 12.1 02/19/2022    INR 1.1 02/19/2022    APTT 42.8 (H) 02/23/2022    K 3.8 02/23/2022    BUN 10 02/23/2022    CREATININE 0.6 02/23/2022       RADIOLOGY:

## 2022-02-23 NOTE — PROGRESS NOTES
Subjective:  2/21/2022  78year-old woman  Seen in cardiovascular ICU earlier this morning  Registered nurse at bedside  Orally intubated  Orogastric tube draining bloody material  Oxygenating well  Awaiting to be extubated  Data reviewed in detail  2/22/2022  Patient was seen in the cardiovascular ICU earlier this morning  Extubated and off ventilator  Not requiring any supplemental oxygen  Daughter at bedside  Data reviewed in detail  Patient is fully alert  2/23/2022  Patient was seen in cardiovascular ICU earlier this morning  Currently n.p.o. Tolerating medications  Registered nurse at bedside  No flatus yet  Wants to eat  Objective:    BP (!) 121/54   Pulse 88   Temp 97.9 °F (36.6 °C) (Temporal)   Resp 20   Ht 5' 1\" (1.549 m)   Wt 226 lb 1.6 oz (102.6 kg)   SpO2 91%   BMI 42.72 kg/m²   Fully alert and oriented  Pupils were equal  Orogastric tube out  IJ line in the right side of the neck  Trachea midline  No adenopathy  Heart:  RRR, no murmurs, gallops, or rubs.   Lungs:  CTA bilaterally, no wheeze, rales or rhonchi  Abd: bowel sounds active dressing intact  Extrem:  No clubbing, cyanosis, or edema  Right arterial line is out  Left arm incision is clean and dry  Riley catheter intact /urine output better   Data reviewed in detail    Assessment:  Bilateral pulmonary emboli status post IVC filter placement postop day 4  Subclavian arterial thrombosis on the left status post embolectomy postop day 4  Mesenteric arterial thrombosis status post embolectomy postop day 4  Overall medically stable  Patient Active Problem List   Diagnosis    Essential hypertension, benign    Lymphedema    Osteoarthritis    Hyperlipidemia    Anxiety    Acute mesenteric arterial occlusion (HCC)    Acute saddle pulmonary embolism with acute cor pulmonale (HCC)    Occlusion of left subclavian artery    Limb ischemia    Multiple pulmonary emboli ([address]       Plan:  Questions answered to her satisfaction  Advance diet and activity  Monitor labs  Long-term anticoagulant therapy will be necessary  ICU care as outlined        Karlee Calixto MD  11:01 AM  2/23/2022

## 2022-02-23 NOTE — PROGRESS NOTES
Daily Robbi Roman MD   25 mg at 02/23/22 2211    oxyCODONE (ROXICODONE) immediate release tablet 5 mg  5 mg Oral Q4H PRN Robbi Roman MD   5 mg at 02/21/22 2126    Or    oxyCODONE HCl (OXY-IR) immediate release tablet 10 mg  10 mg Oral Q4H PRN Robbi Roman MD        HYDROmorphone (DILAUDID) injection 0.5 mg  0.5 mg IntraVENous Q4H PRN Robbi Roman MD        acetaminophen (TYLENOL) tablet 650 mg  650 mg Oral Q4H While awake Robbi Roman MD   650 mg at 02/23/22 1007    polyethylene glycol (GLYCOLAX) packet 17 g  17 g Oral Daily Robbi Roman MD   17 g at 02/23/22 4137    sennosides-docusate sodium (SENOKOT-S) 8.6-50 MG tablet 2 tablet  2 tablet Oral BID Robbi Roman MD   2 tablet at 02/23/22 1351    insulin lispro (HUMALOG) injection vial 0-12 Units  0-12 Units SubCUTAneous TID WC Robbi Roman MD   2 Units at 02/23/22 1207    insulin lispro (HUMALOG) injection vial 0-6 Units  0-6 Units SubCUTAneous Nightly Robbi Roman MD   1 Units at 02/21/22 2126    glucose (GLUTOSE) 40 % oral gel 15 g  15 g Oral PRN Bella River MD        dextrose 50 % IV solution  12.5 g IntraVENous PRN Bella River MD        glucagon (rDNA) injection 1 mg  1 mg IntraMUSCular PRN Bella River MD        dextrose 5 % solution  100 mL/hr IntraVENous PRN Bella River MD        atorvastatin (LIPITOR) tablet 20 mg  20 mg Oral Daily Bella River MD   20 mg at 02/23/22 0910    DULoxetine (CYMBALTA) extended release capsule 20 mg  20 mg Oral Daily Bella River MD   20 mg at 02/23/22 0913    heparin (porcine) injection 6,950 Units  80 Units/kg IntraVENous PRN Anna Burdick DO        heparin (porcine) injection 3,480 Units  40 Units/kg IntraVENous PRN Anna Burdick DO   3,480 Units at 02/23/22 0644    heparin 25,000 units in dextrose 5% 250 mL (premix) infusion  18 Units/kg/hr IntraVENous Continuous Niru Espinal DO 14.8 mL/hr at 02/23/22 0643 17 Units/kg/hr at 02/23/22 0643    sodium chloride flush 0.9 % injection 5-40 mL  5-40 mL IntraVENous 2 times per day Viviana Cornell MD   10 mL at 02/23/22 7438    sodium chloride flush 0.9 % injection 5-40 mL  5-40 mL IntraVENous PRN Viviana Cornell MD        0.9 % sodium chloride infusion  25 mL IntraVENous PRN Viviana Cornell MD        perflutren lipid microspheres (DEFINITY) injection 1.65 mg  1.5 mL IntraVENous ONCE PRN Viviana Cornell MD         IMPRESSION:  Patient Active Problem List   Diagnosis    Essential hypertension, benign    Lymphedema    Osteoarthritis    Hyperlipidemia    Anxiety    Acute mesenteric arterial occlusion ([address]    Acute saddle pulmonary embolism with acute cor pulmonale (HCC)    Occlusion of left subclavian artery    Limb ischemia    Multiple pulmonary emboli [facility])     Impression/Plan:  78 y.o. female with Hx of HTN, DMII, lymphedema, multiple prior DVT who was treated with short term anticoagulation (Coumadin), was not currently taking, who presented with bilateral massive PE, left subclavian artery thrombus, and SMA thrombus. She was started on a heparin drip and then taken to OR by Vascular for SMA embolectomy, IVC filter, and left arm embolectomy. Hypercoag work-up in process  D-Dimer 4531  Homocysteine 4.3  DRVVT Negative  Cardiolipin Beta-2 Glycoprotein pending  AT-III Activity 64% - may be low in acute setting. Protein C 83%   Protein S pending  Thrombophilia pending  Continue Heparin drip, transition to Eliquis for discharge   Continue to monitor CBC : Hb 8.2 on 02/23/2022; Transfuse if Hb <7.0  Dr. Milinda Schirmer consulted to evaluate the liver lesion on CTA. CTA triphasic liver preliminary result consistent with hepatic cysts. AFP pending. Will continue to follow     Thank you for allowing us to participate in the care of Mrs. Cleaning. Silvia Hodge, [address] [facility]    The patient was seen and examined, I agree with Silvia Hodge CNP's findings, assessment and plan as outlined.   02/23/2022  Octavio Alfaro MD

## 2022-02-23 NOTE — PLAN OF CARE
Problem: Skin Integrity:  Goal: Will show no infection signs and symptoms  Description: Will show no infection signs and symptoms  2/22/2022 2041 by Candida Adhikari RN  Outcome: Met This Shift     Problem: Skin Integrity:  Goal: Absence of new skin breakdown  Description: Absence of new skin breakdown  2/22/2022 2041 by Candida Adhikari RN  Outcome: Met This Shift     Problem: Falls - Risk of:  Goal: Will remain free from falls  Description: Will remain free from falls  2/22/2022 2041 by Candida Adhikari RN  Outcome: Met This Shift     Problem: Falls - Risk of:  Goal: Absence of physical injury  Description: Absence of physical injury  2/22/2022 2041 by Candida Adhikari RN  Outcome: Met This Shift     Problem: Pain:  Goal: Pain level will decrease  Description: Pain level will decrease  Outcome: Met This Shift     Problem: Musculor/Skeletal Functional Status  Goal: Absence of falls  Outcome: Met This Shift

## 2022-02-24 LAB
ALBUMIN SERPL-MCNC: 2.5 G/DL (ref 3.5–5.2)
ALP BLD-CCNC: 129 U/L (ref 35–104)
ALT SERPL-CCNC: 42 U/L (ref 0–32)
ANION GAP SERPL CALCULATED.3IONS-SCNC: 6 MMOL/L (ref 7–16)
ANTICARDIOLIPIN IGA ANTIBODY: <10 APL
ANTICARDIOLIPIN IGG ANTIBODY: <10 GPL
APTT: 75.4 SEC (ref 24.5–35.1)
AST SERPL-CCNC: 55 U/L (ref 0–31)
BASOPHILS ABSOLUTE: 0.03 E9/L (ref 0–0.2)
BASOPHILS RELATIVE PERCENT: 0.3 % (ref 0–2)
BILIRUB SERPL-MCNC: 0.5 MG/DL (ref 0–1.2)
BUN BLDV-MCNC: 14 MG/DL (ref 6–23)
CALCIUM IONIZED: 1.23 MMOL/L (ref 1.15–1.33)
CALCIUM SERPL-MCNC: 8.2 MG/DL (ref 8.6–10.2)
CARDIOLIPIN AB IGM: 11 MPL
CHLORIDE BLD-SCNC: 98 MMOL/L (ref 98–107)
CO2: 33 MMOL/L (ref 22–29)
CREAT SERPL-MCNC: 0.8 MG/DL (ref 0.5–1)
EOSINOPHILS ABSOLUTE: 0.42 E9/L (ref 0.05–0.5)
EOSINOPHILS RELATIVE PERCENT: 4.3 % (ref 0–6)
GFR AFRICAN AMERICAN: >60
GFR NON-AFRICAN AMERICAN: >60 ML/MIN/1.73
GLUCOSE BLD-MCNC: 103 MG/DL (ref 74–99)
HCT VFR BLD CALC: 27.3 % (ref 34–48)
HEMOGLOBIN: 8.2 G/DL (ref 11.5–15.5)
IMMATURE GRANULOCYTES #: 0.15 E9/L
IMMATURE GRANULOCYTES %: 1.5 % (ref 0–5)
LYMPHOCYTES ABSOLUTE: 1.99 E9/L (ref 1.5–4)
LYMPHOCYTES RELATIVE PERCENT: 20.5 % (ref 20–42)
Lab: NORMAL
MAGNESIUM: 2.1 MG/DL (ref 1.6–2.6)
MCH RBC QN AUTO: 24.7 PG (ref 26–35)
MCHC RBC AUTO-ENTMCNC: 30 % (ref 32–34.5)
MCV RBC AUTO: 82.2 FL (ref 80–99.9)
METER GLUCOSE: 105 MG/DL (ref 74–99)
METER GLUCOSE: 131 MG/DL (ref 74–99)
METER GLUCOSE: 144 MG/DL (ref 74–99)
METER GLUCOSE: 160 MG/DL (ref 74–99)
METER GLUCOSE: 96 MG/DL (ref 74–99)
MONOCYTES ABSOLUTE: 0.73 E9/L (ref 0.1–0.95)
MONOCYTES RELATIVE PERCENT: 7.5 % (ref 2–12)
NEUTROPHILS ABSOLUTE: 6.4 E9/L (ref 1.8–7.3)
NEUTROPHILS RELATIVE PERCENT: 65.9 % (ref 43–80)
PDW BLD-RTO: 14.6 FL (ref 11.5–15)
PHOSPHORUS: 3.5 MG/DL (ref 2.5–4.5)
PLATELET # BLD: 296 E9/L (ref 130–450)
PMV BLD AUTO: 9.6 FL (ref 7–12)
POTASSIUM SERPL-SCNC: 3.6 MMOL/L (ref 3.5–5)
PROTEIN S ANTIGEN, FREE: 93 % (ref 55–123)
RBC # BLD: 3.32 E12/L (ref 3.5–5.5)
REPORT: NORMAL
SODIUM BLD-SCNC: 137 MMOL/L (ref 132–146)
THIS TEST SENT TO: NORMAL
TOTAL PROTEIN: 5 G/DL (ref 6.4–8.3)
VON WILLEBRAND AG: 468 % (ref 52–214)
WBC # BLD: 9.7 E9/L (ref 4.5–11.5)

## 2022-02-24 PROCEDURE — 99233 SBSQ HOSP IP/OBS HIGH 50: CPT | Performed by: INTERNAL MEDICINE

## 2022-02-24 PROCEDURE — 6370000000 HC RX 637 (ALT 250 FOR IP): Performed by: NURSE PRACTITIONER

## 2022-02-24 PROCEDURE — 94660 CPAP INITIATION&MGMT: CPT

## 2022-02-24 PROCEDURE — 2000000000 HC ICU R&B

## 2022-02-24 PROCEDURE — 36415 COLL VENOUS BLD VENIPUNCTURE: CPT

## 2022-02-24 PROCEDURE — 6370000000 HC RX 637 (ALT 250 FOR IP): Performed by: INTERNAL MEDICINE

## 2022-02-24 PROCEDURE — 2580000003 HC RX 258: Performed by: STUDENT IN AN ORGANIZED HEALTH CARE EDUCATION/TRAINING PROGRAM

## 2022-02-24 PROCEDURE — 84100 ASSAY OF PHOSPHORUS: CPT

## 2022-02-24 PROCEDURE — 2700000000 HC OXYGEN THERAPY PER DAY

## 2022-02-24 PROCEDURE — 6370000000 HC RX 637 (ALT 250 FOR IP): Performed by: SURGERY

## 2022-02-24 PROCEDURE — 85025 COMPLETE CBC W/AUTO DIFF WBC: CPT

## 2022-02-24 PROCEDURE — 97530 THERAPEUTIC ACTIVITIES: CPT

## 2022-02-24 PROCEDURE — 97535 SELF CARE MNGMENT TRAINING: CPT

## 2022-02-24 PROCEDURE — 6370000000 HC RX 637 (ALT 250 FOR IP): Performed by: STUDENT IN AN ORGANIZED HEALTH CARE EDUCATION/TRAINING PROGRAM

## 2022-02-24 PROCEDURE — 85730 THROMBOPLASTIN TIME PARTIAL: CPT

## 2022-02-24 PROCEDURE — 80053 COMPREHEN METABOLIC PANEL: CPT

## 2022-02-24 PROCEDURE — 82330 ASSAY OF CALCIUM: CPT

## 2022-02-24 PROCEDURE — 83735 ASSAY OF MAGNESIUM: CPT

## 2022-02-24 PROCEDURE — 82962 GLUCOSE BLOOD TEST: CPT

## 2022-02-24 PROCEDURE — 36592 COLLECT BLOOD FROM PICC: CPT

## 2022-02-24 RX ORDER — ATORVASTATIN CALCIUM 10 MG/1
10 TABLET, FILM COATED ORAL DAILY
Status: DISCONTINUED | OUTPATIENT
Start: 2022-02-24 | End: 2022-02-28 | Stop reason: HOSPADM

## 2022-02-24 RX ADMIN — APIXABAN 10 MG: 5 TABLET, FILM COATED ORAL at 21:44

## 2022-02-24 RX ADMIN — GABAPENTIN 300 MG: 300 CAPSULE ORAL at 21:43

## 2022-02-24 RX ADMIN — GABAPENTIN 300 MG: 300 CAPSULE ORAL at 08:42

## 2022-02-24 RX ADMIN — ACETAMINOPHEN 650 MG: 325 TABLET ORAL at 11:14

## 2022-02-24 RX ADMIN — LOSARTAN POTASSIUM 100 MG: 50 TABLET, FILM COATED ORAL at 08:41

## 2022-02-24 RX ADMIN — SENNOSIDES AND DOCUSATE SODIUM 2 TABLET: 50; 8.6 TABLET ORAL at 21:43

## 2022-02-24 RX ADMIN — ATORVASTATIN CALCIUM 10 MG: 10 TABLET, FILM COATED ORAL at 09:07

## 2022-02-24 RX ADMIN — DULOXETINE HYDROCHLORIDE 20 MG: 20 CAPSULE, DELAYED RELEASE ORAL at 08:41

## 2022-02-24 RX ADMIN — HYDROCHLOROTHIAZIDE 25 MG: 25 TABLET ORAL at 08:41

## 2022-02-24 RX ADMIN — GABAPENTIN 300 MG: 300 CAPSULE ORAL at 13:39

## 2022-02-24 RX ADMIN — APIXABAN 10 MG: 5 TABLET, FILM COATED ORAL at 09:06

## 2022-02-24 RX ADMIN — ACETAMINOPHEN 650 MG: 325 TABLET ORAL at 18:10

## 2022-02-24 RX ADMIN — INSULIN LISPRO 1 UNITS: 100 INJECTION, SOLUTION INTRAVENOUS; SUBCUTANEOUS at 22:36

## 2022-02-24 RX ADMIN — Medication 10 ML: at 08:42

## 2022-02-24 RX ADMIN — SENNOSIDES AND DOCUSATE SODIUM 2 TABLET: 50; 8.6 TABLET ORAL at 08:41

## 2022-02-24 RX ADMIN — Medication 10 ML: at 21:43

## 2022-02-24 RX ADMIN — FUROSEMIDE 20 MG: 20 TABLET ORAL at 08:41

## 2022-02-24 RX ADMIN — ACETAMINOPHEN 650 MG: 325 TABLET ORAL at 05:43

## 2022-02-24 RX ADMIN — INSULIN LISPRO 2 UNITS: 100 INJECTION, SOLUTION INTRAVENOUS; SUBCUTANEOUS at 11:58

## 2022-02-24 RX ADMIN — ACETAMINOPHEN 650 MG: 325 TABLET ORAL at 21:43

## 2022-02-24 ASSESSMENT — PAIN SCALES - GENERAL
PAINLEVEL_OUTOF10: 3
PAINLEVEL_OUTOF10: 0
PAINLEVEL_OUTOF10: 3
PAINLEVEL_OUTOF10: 3
PAINLEVEL_OUTOF10: 0
PAINLEVEL_OUTOF10: 4
PAINLEVEL_OUTOF10: 4
PAINLEVEL_OUTOF10: 0
PAINLEVEL_OUTOF10: 3
PAINLEVEL_OUTOF10: 0

## 2022-02-24 ASSESSMENT — PAIN DESCRIPTION - LOCATION
LOCATION: INCISION
LOCATION: INCISION

## 2022-02-24 ASSESSMENT — PAIN DESCRIPTION - ORIENTATION
ORIENTATION: MID
ORIENTATION: MID

## 2022-02-24 ASSESSMENT — PAIN DESCRIPTION - ONSET
ONSET: ON-GOING
ONSET: ON-GOING

## 2022-02-24 ASSESSMENT — PAIN DESCRIPTION - PROGRESSION
CLINICAL_PROGRESSION: GRADUALLY IMPROVING

## 2022-02-24 ASSESSMENT — PAIN DESCRIPTION - PAIN TYPE
TYPE: SURGICAL PAIN

## 2022-02-24 ASSESSMENT — PAIN DESCRIPTION - FREQUENCY
FREQUENCY: CONTINUOUS
FREQUENCY: CONTINUOUS

## 2022-02-24 ASSESSMENT — PAIN DESCRIPTION - DESCRIPTORS
DESCRIPTORS: ACHING
DESCRIPTORS: ACHING

## 2022-02-24 ASSESSMENT — PAIN - FUNCTIONAL ASSESSMENT
PAIN_FUNCTIONAL_ASSESSMENT: PREVENTS OR INTERFERES SOME ACTIVE ACTIVITIES AND ADLS
PAIN_FUNCTIONAL_ASSESSMENT: PREVENTS OR INTERFERES SOME ACTIVE ACTIVITIES AND ADLS

## 2022-02-24 NOTE — PROGRESS NOTES
[facility] NING DICKSON 1940    SUBJECTIVE:  Up to chair, reports minimal pain to abdomen   Discussed eliquis dosing     OBJECTIVE:  VITALS:  /61   Pulse 75   Temp 97.5 °F (36.4 °C) (Temporal)   Resp 15   Ht 5' 1\" (1.549 m)   Wt 223 lb 8 oz (101.4 kg)   SpO2 93%   BMI 42.23 kg/m²    GENERAL: Fatigued. Alert and oriented   ENT:  oropharynx clear, no evidence of mucositis. NECK:  No  lymphadenopathy. LUNGS:  Clear to auscultation, no wheeze or rhonchi   CARDIOVASCULAR:  Regular rate and rhythm. No clicks, murmurs, rubs or gallops. ABDOMEN:  Soft, tender, nondistended    NEUROLOGIC:  No focal deficits. EXTREMITIES: without clubbing, cyanosis, or edema.     DIAGNOSTIC DATA  Lab Results   Component Value Date    WBC 9.7 02/24/2022    HGB 8.2 (L) 02/24/2022    HCT 27.3 (L) 02/24/2022    MCV 82.2 02/24/2022     02/24/2022     Recent Labs     02/22/22  0500 02/23/22  0500 02/24/22  0500    139 137   CO2 26 32* 33*   PHOS 2.1* 2.6 3.5   BUN 9 10 14   CREATININE 0.6 0.6 0.8     Lab Results   Component Value Date    ALT 42 (H) 02/24/2022    AST 55 (H) 02/24/2022    ALKPHOS 129 (H) 02/24/2022    BILITOT 0.5 02/24/2022     Lab Results   Component Value Date    LABALBU 2.5 (L) 02/24/2022     Current Facility-Administered Medications   Medication Dose Route Frequency Provider Last Rate Last Admin    atorvastatin (LIPITOR) tablet 10 mg  10 mg Oral Daily Roetta Ripa, APRN - CNP   10 mg at 02/24/22 4965    apixaban (ELIQUIS) tablet 10 mg  10 mg Oral BID Roetta Ripa, APRN - CNP   10 mg at 02/24/22 3267    Followed by    Corrinne Lies ON 3/3/2022] apixaban (ELIQUIS) tablet 5 mg  5 mg Oral BID Roetta Ripa, APRN - CNP        gabapentin (NEURONTIN) capsule 300 mg  300 mg Oral TID Dionne Correia MD   300 mg at 02/24/22 6573    furosemide (LASIX) tablet 20 mg  20 mg Oral Daily Ernie Boswell MD   20 mg at 02/24/22 0841    losartan (COZAAR) tablet 100 mg  100 mg Oral Daily Ernie Boswell MD   100 mg at 02/24/22 0841    And    hydroCHLOROthiazide (HYDRODIURIL) tablet 25 mg  25 mg Oral Daily Addy Snow MD   25 mg at 02/24/22 3726    oxyCODONE (ROXICODONE) immediate release tablet 5 mg  5 mg Oral Q4H PRN Addy Snow MD   5 mg at 02/21/22 2126    Or    oxyCODONE HCl (OXY-IR) immediate release tablet 10 mg  10 mg Oral Q4H PRN Addy Snow MD        HYDROmorphone (DILAUDID) injection 0.5 mg  0.5 mg IntraVENous Q4H PRN Addy Snow MD        acetaminophen (TYLENOL) tablet 650 mg  650 mg Oral Q4H While awake Addy Snow MD   650 mg at 02/24/22 1114    polyethylene glycol (GLYCOLAX) packet 17 g  17 g Oral Daily Addy Snow MD   17 g at 02/23/22 0910    sennosides-docusate sodium (SENOKOT-S) 8.6-50 MG tablet 2 tablet  2 tablet Oral BID Addy Snow MD   2 tablet at 02/24/22 0841    insulin lispro (HUMALOG) injection vial 0-12 Units  0-12 Units SubCUTAneous TID WC Addy Snow MD   2 Units at 02/24/22 1158    insulin lispro (HUMALOG) injection vial 0-6 Units  0-6 Units SubCUTAneous Nightly Addy Snow MD   1 Units at 02/21/22 2126    glucose (GLUTOSE) 40 % oral gel 15 g  15 g Oral PRN Jr Ken MD        dextrose 50 % IV solution  12.5 g IntraVENous PRN Jr Ken MD        glucagon (rDNA) injection 1 mg  1 mg IntraMUSCular PRN Jr Ken MD        dextrose 5 % solution  100 mL/hr IntraVENous PRN Jr Ken MD        DULoxetine (CYMBALTA) extended release capsule 20 mg  20 mg Oral Daily Jr Ken MD   20 mg at 02/24/22 0841    sodium chloride flush 0.9 % injection 5-40 mL  5-40 mL IntraVENous 2 times per day Jr Ken MD   10 mL at 02/24/22 2527    sodium chloride flush 0.9 % injection 5-40 mL  5-40 mL IntraVENous PRN Jr Ken MD        0.9 % sodium chloride infusion  25 mL IntraVENous PRN Jr Ken MD        perflutren lipid microspheres (DEFINITY) injection 1.65 mg  1.5 mL IntraVENous ONCE PRN Particjessy Ken MD         IMPRESSION:  Patient Active Problem List   Diagnosis    Essential hypertension, benign    Lymphedema    Osteoarthritis    Hyperlipidemia    Anxiety    Acute mesenteric arterial occlusion (HCC)    Acute saddle pulmonary embolism with acute cor pulmonale (HCC)    Occlusion of left subclavian artery    Limb ischemia    Multiple pulmonary emboli (HCC)     Impression/Plan:  78 y.o. female with Hx of HTN, DMII, lymphedema, multiple prior DVT who was treated with short term anticoagulation (Coumadin), was not currently taking, who presented with bilateral massive PE, left subclavian artery thrombus, and SMA thrombus. She was started on a heparin drip and then taken to OR by Vascular for SMA embolectomy, IVC filter, and left arm embolectomy. Hypercoag work-up in process  -D-Dimer 4531  -Homocysteine 4.3  -DRVVT Negative  -Cardiolipin antibodies: IgG <10 (<14), IgM: 11 (<12) IgA: <10 (<11)  -AT-III Activity 64% - may be low in acute setting.  -Protein C 83%   -Protein S pending  -Thrombophilia:   Factor V Leiden: No mutation  Prothrombin [id number]: no mutation detected  RENA-1 Genotype: Heterozygote   Factor XIII V34L: no mutation detected   -Has transitioned to Eliquis today-started on 10 mg BID    Continue to monitor CBC : Hb 8.2 on 02/24/2022; Transfuse if Hb <7.0  Dr. Alberto Barney consulted to evaluate the liver lesion on CTA. CTA triphasic liver consistent with hepatic cysts. AFP pending. Will continue to follow     Thank you for allowing us to participate in the care of Mrs. Cleaning. Wanda Melchor [address] [place] [address]   [phone]    The patient was seen and examined, I agree with Wanda Melchor CNP's findings, assessment and plan as outlined.   02/24/2022  Mariely Jeffery MD

## 2022-02-24 NOTE — PROGRESS NOTES
Date: 2/23/2022    Time: 11:06 PM    Patient Placed On BIPAP/CPAP/ Non-Invasive Ventilation? Yes    If no must comment. Facial area red/color change? No           If YES are Blister/Lesion present? No   If yes must notify nursing staff  BIPAP/CPAP skin barrier?   Yes    Skin barrier type:mepilexlite       Comments:        Sue Costa RCP

## 2022-02-24 NOTE — CARE COORDINATION
o2 4L . Heparin drip stopped and started po Eliquis. Pt asleep. Placed 30 day free Eliquis coupon in soft chart to be given to pt at discharge. Pt [facility] score 8, OT 13.  left with daughter Farhat Weber to follow up on jovan choices that I emailed her a few days ago. Will await return call.

## 2022-02-24 NOTE — PROGRESS NOTES
OCCUPATIONAL THERAPY TREATMENT NOTE    [employer] [address] [facility]  [address], [place]       Date:2022                                                               Patient Name: Duyen No  MRN: [id number]  : 1940  Room: [address] [place] [address]       Evaluating OT: Dheeraj Turner, OTR/L [id number]     Referring Provider: Shabbir Buck MD   Specific Provider Orders/Date: OT eval and treat (22)        Diagnosis:  Superior mesenteric artery thrombosis                Limb ischemia               Left axillary artery thrombus               Acute saddle pulmonary embolism with acute cor pulmonale              Ischemia of left upper extremity               Multiple pulmonary emboli      Reason for admission: stroke like symptoms; L UE numbness and weakness. 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 Functional mobility          [x]? ADLs           [x]? Strength                  []?Cognition   [x]? Functional transfers        [x]? IADLs         [x]? Safety Awareness   [x]? Endurance   []? Fine Coordination           [x]? ROM           []? Vision/perception    []? Sensation     []? Gross Motor Coordination [x]? Balance    []? Delirium                  []?Motor Control     []?  Communication     OT PLAN OF CARE   OT POC based on physician orders, patient diagnosis and results of clinical assessment.        Frequency/Duration: 1-3 days/wk for 1-2 weeks PRN    Specific OT Treatment to include:   ADL retraining/adapted techniques and AE recommendations to increase functional independence within precautions                    Energy conservation techniques to improve tolerance for selfcare routine   Functional transfer/mobility training/DME recommendations for increased independence, safety and fall prevention         Patient/family education to increase safety and functional independence within precautions              Environmental modifications for safe mobility and completion of ADLs                           Therapeutic activity to improve functional performance during ADLs/IADLs                                         Therapeutic exercise to improve tolerance and functional strength for ADLs   Balance retraining exercises/tasks for facilitation of postural control with dynamic challenges during ADLs . Positioning to improve functional independence     Recommended Adaptive Equipment: TBA: LB Dressing AE, tub transfer bench and rail, commode rails       Home Living: Pt lives alone  in a 2 floor plan with 3 step(s) to enter through garage and no rail(s); bed & half bath on first floor; walk in shower in basement: flight with rail. Tub/shower in 2nd floor: 9 steps/1HR. Bathroom setup: pt uses walk in shower in basement with seat; standard height commode seats  Equipment owned: shower seat, rollator     Prior Level of Function: IND with ADLs;  IND with IADLs. IND for ambulation. Pt has been using rollator for past week due to difficulty ambulating (LE and back pain.)  Driving: yes     Pain Level: pt c/o 6/10 abdominal pain     Cognition: A&O: 4/4    Follows 1-2 step commands appropriately.               Memory: good             Comprehension good             Problem solving: fair+/good             Judgement/safety: fair+/good                Communication skills: WFL             Vision: WFL                    Glasses:s/p cataract surgery- no longer wears glasses                                                       Hearing: WFL               RASS: 0  CAM-ICU: (NT) Delirium     UE Assessment:  Hand Dominance: Right [x]? Left []?       ROM Strength   RUE  WFL 4/5   LUE WFL 4-/5      Sensation: No c/o numbness/tingling in  extremities. Tone: WNL   Edema: min edema L UE; min B LE's/moderate in feet     Functional Assessment:  AM-PAC Daily Activity Raw Score: 13/24    Initial Eval Status  Date: 2/22/22 Treatment Status  Date:2/24 STGs = LTGs  Time frame: 7-14 days   Feeding S; set up S; set up  Bed level  (pt encouraged to ask to eat meals up in chair to increase activity tolerance)                        Rita  while seated up in chair to increase activity tolerance         Grooming Min A  set up Min A  seated                        SBA   while standing sink level requiring [employer] for balance and demonstrating G tolerance       UB dressing/bathing Max A  Mod A                       Min A         LB dressing/bathing Dep     Max A using AE after instruction; seated up in chair  Mod A overall  using AE to roni socks/pants-  Pt stood [employer] level for balance to pul pants up over hips. Min A   using AE as needed for safe reach/ energy conservation        Toileting Dep  NT                       Min A      Bed Mobility  Supine to sit: Max A+2     Sit to supine:   NT Supine to sit: Max A+2     Sit to supine:   NT                        Min A  in prep of ADL tasks & transfers   Functional Transfers Sit to stand: Mod A     Stand to sit:   Mod A  Sit to stand: Max A+2 bed, chair     Stand to sit: Max A                       Min A  sit<>stand/functional bathroom transfers using AD/DME as needed for balance and safety   Functional Mobility Mod A SPT to chair using [employer] Max A+2 SPT to chair for ADLs                       Min A   functional/bathroom mobility using AD as needed & demonstrating G safety      Balance Sitting:     Static:  Min A    Dynamic:ModA/ posterior LOB  Standing:  Mod A [employer] Sitting:     Static:  Min A    Dynamic:ModA/ posterior LOB  Standing: Ana SCOTT dynamic sitting balance; Min A dynamic standing balance  during ADL tasks & transfers   Endurance/Activity Tolerance    F tolerance with light activity. F tolerance with light activity.  G   tolerance with moderate activity/self care routine   Visual/  Perceptual    WFL                         Vitals:   HR at rest: 88 bpm HR at end of session: 91 bpm   Spo2 at rest:97% Spo2 at end of session: 94%   BP at rest: 122/61 mmHg BP at end of session: 106/64 mmHg     Treatment: OT treatment provided this date:  Bed mobility: Instruction on precautions prior to bed mobility to facilitate safe transfers and ADLS. Pt required 2 person assist for safe mobility due to complexity of medical condition, medical lines and deconditioning. HOB elevated to assist.     Balance retraining: Performed sitting/standing balance ex's with instruction to facilitate righting reactions with postural changes during ADLS. Pt provided with [employer] to assist.      ADL retraining: Instruction on adapted techniques/AE(reacher, sock aid) to increase independence and safe reach during dressing/bathing activities. Pt demonstrated fair- follow through. Pt can benefit from further training. Functional transfer training:  Instruction on postural cues, hand placement/sequencing, & walker safety  to assist with balance and fall prevention during self care routine/bathroom transfers. Energy conservation: Education on breathing techniques, pacing, work simplification strategies & recommended bathroom DME for safety and energy conservation during self care tasks and activities of daily living. Line management and environmental modifications made prior to and end of session to ensure patient safety and to increase efficiency of session. Skilled monitoring of HR, O2 saturation, blood pressure and patient's response to activity performed throughout session. Comments: OK from RN to see patient.  Upon arrival, patient supine in bed, agreeable to session. Pt demo fair+/good tolerance with good understanding of education/techniques. At end of session, patient left seated in chair to increase activity tolerance. Call light within reach, all lines and tubes intact. Pt instructed on use of call light for assistance and fall prevention. Overall, pt presents with decreased activity tolerance, dynamic balance, functional mobility and pain limiting completion of ADLs and safe return home. Pt can benefit from continued skilled OT to increase safety, functional independence & quality of life. · Pt has made good progress towards set goals.    · Continue with current plan of care       Time In:1100              Time Out: 1138         Total Treatment Time: 38      Treatment Charges: Mins Units   Ther Ex  81838     Manual Therapy 00317     Thera Activities 43839 15 1   ADL/Home Mgt 25890 23 2   Neuro Re-ed 46553     Orthotic manage/training  33342     Non-Billable Time         Ally Martinez, OTR/L 2940

## 2022-02-24 NOTE — PROGRESS NOTES
[facility] SURGERY  DAILY PROGRESS NOTE  2/24/2022  Chief Complaint   Patient presents with    Numbness     L arm numbness, started at 1030 today, -thinners, bgl 219 per ems       Subjective:  Consulted yesterday for abnormal appearing CT scan of the abdomen c/f liver mass. Appears to be cyst.    Objective:  BP (!) 100/59   Pulse 87   Temp 97.5 °F (36.4 °C) (Temporal)   Resp 16   Ht 5' 1\" (1.549 m)   Wt 223 lb 8 oz (101.4 kg)   SpO2 96%   BMI 42.23 kg/m²     In: 1617.9 [P.O.:720; I.V.:897.9]  Out: 2610 [Urine:2610]    PSYCH: mood and affect normal, alert and oriented x 3: No apparent distress, comfortable  EYES: Sclera white, pupils equal round and reactive to light  ENMT:  Hearing normal, trachea midline  LYMPH: no obvious lympadenopathy in neck. RESP: symmetric chest rise  CV:  No pedal edema  GI/ Abdomen: Soft, nondistended, nontender, midline dressing with minimal strikethrough  MSK: no clubbing/ no cyanosis/ gaitnormal      Assessment/Plan:  78 y.o. female s/p ex-lap SMA embolectomy, IVC filter placement and LUE embolectomy on 2/19 found to have cystic appearing mass measuring 6cm in the right hepatic lobe. - hepatic  Cyst on triphasic CT  - no additional imaging or follow up needed    Thank you for the consultation allowing me to take part in Ms. Cleaning's care.     Electronically signed by Jaimie Hernandez MD on 2/24/2022 at 11:32 AM

## 2022-02-24 NOTE — PROGRESS NOTES
Physical Therapy  Physical Therapy Treatment Note  Name: Agusto Maravilla  : 1940  MRN: [id number]      Date of Service: 2022    Evaluating PT:  Dinh Espinoza PT, DPT [id number]    Room #:  1321/0859-Q  Diagnosis:  Superior mesenteric artery thrombosis ([facility] [address] [K55.069]  Limb ischemia [I99.8]  Left axillary artery thrombus ([facility] [address] [I74.2]  Acute saddle pulmonary embolism with acute cor pulmonale (HCC) [I26.02]  Ischemia of left upper extremity [I99.8]  Multiple pulmonary emboli (HCC) [I26.99]  PMHx/PSHx:  Arthritis, HLD, HTN, hypothyroidism, Dupuytren's contracture, lymphedema   Procedure/Surgery:  (22) Vena cava filter insertion, Exploratory laparotomy, Mesenteric artery embolectomy, left arm embolectomy, ()  tPA  Precautions:  Falls, O2, arterial line  Equipment Needs:  [employer]    SUBJECTIVE:    Pt lives alone in a 2 story home with 3 stairs to enter from garage and no rail. Bed and bathroom located on the first floor. There are 9 steps and 1 rail to get to second story. Pt ambulated with [employer] for the past week to improve safety from LBP and independent PTA. OBJECTIVE:   Initial Evaluation  Date: 22 Treatment  22 Short Term/ Long Term   Goals   AM-PAC 6 Clicks 052 3    Was pt agreeable to Eval/treatment? Yes Yes    Does pt have pain?  Yes, 9/10 abdominal pain with mobility  Yes, 6/10 abdominal pain     Bed Mobility  Rolling: NT  Supine to sit: MaxA x2 with HOB elevated   Sit to supine: NT  Scooting: NT Rolling: NT  Supine to sit: MaxA x2 with HOB elevated   Sit to supine: NT  Scooting: NT Guillermina   Transfers Sit to stand: ModA  Stand to sit: ModA  Stand pivot: ModA with [employer] Sit to stand: MaxA x 2  Stand to sit: MaxA  Stand pivot: MaxA with [employer] SBA with [employer]   Ambulation   A few steps forward with ModA and [employer] 20 feet with ModA and [employer] >150 feet with SBA and [employer]   Stair negotiation: ascended and descended NT NT >4 steps with 1 rail Guillermina   ROM BUE:  Defer to OT note  BLE:  [facility] BUE:  Defer to OT note  BLE:  4/5 grossly   Increase by 1/3 MMT grade   Balance Sitting EOB:  ModA dynamically   Dynamic Standing:  ModA with [employer] Sitting EOB:  MaxA    Dynamic Standing:  ModA with [employer] Sitting EOB:  SBA  Dynamic Standing:  SBA with [employer]     Pt is A & O x 4  CAM-ICU: NT  RASS: 0  Sensation:  No parasethsias reported   Edema:  Bilateral hands and feet/ankles-improved since evaluation     Vitals:  Heart Rate at rest 88 bpm Heart Rate post session 91 bpm   SpO2 at rest 97% SpO2 post session 94%   Blood Pressure at rest 122/61 mmHg Blood Pressure post session 106/64 mmHg       Functional Status Score-Intensive Care Unit (FSS-ICU)   Rolling -/7   Supine to sit transfer 1/7   Unsupported sitting  2/7   Sit to stand transfers 1/7   Ambulation 1/7   Total  5/35       Therapeutic Exercises:  n/a    Patient education  Pt educated on safety and role of PT     Patient response to education:   Pt verbalized understanding Pt demonstrated skill Pt requires further education in this area   x x x     ASSESSMENT:    Conditions Requiring Skilled Therapeutic Intervention:    [x]Decreased strength     []Decreased ROM  [x]Decreased functional mobility  [x]Decreased balance   [x]Decreased endurance   [x]Decreased posture  []Decreased sensation  []Decreased coordination   []Decreased vision  []Decreased safety awareness   [x]Increased pain       Comments: NP reported pt was medically stable. Pt was in bed upon arrival, agreeable to treatment session. Increased abdominal pain reported with forward flexion. Pt required physical assistance through trunk and LEs to reach EOB. There was a decline in static sitting balance from previous session which required increased assistance through trunk to maintain upright posture. When standing from bed, pt was unsteady and took short steps to get to chair. During ambulation, pt had a downward gaze, WW outside BENEDICT and a decreased gait speed.  Fatigue limited activity and pt was returned to chair. Pt was left in chair with all needs met and call light in reach. All lines remained intact. Treatment:  Patient practiced and was instructed in the following treatment:     Bed mobility training - pt given verbal and tactile cues to facilitate proper sequencing and safety during supine>sit as well as provided with physical assistance.  Sitting EOB for >5 minutes for upright tolerance, postural awareness and BLE ROM   Transfer training - pt was given verbal and tactile cues to facilitate proper hand placement, technique and safety during sit to stand, stand to sit and stand pivot transfers as well as provided with physical assistance.  Gait training- pt was given verbal and tactile cues to facilitate safety and balance during ambulation as well as provided with physical assistance. PLAN:    Patient is making good progress towards established goals. Will continue with current POC.       Time in  1050  Time out  1130    Total Treatment Time  40 minutes     CPT codes:  [] Gait training 50576 - minutes  [] Manual therapy [id number] - minutes  [x] Therapeutic activities 74928 40 minutes  [] Therapeutic exercises 56552 - minutes  [] Neuromuscular reeducation 25564 - minutes    [facility], SPT Fredna Burns, [place], [place]  [id number]

## 2022-02-24 NOTE — PROGRESS NOTES
Date: 2/24/2022    Time: 12:09 AM    Patient Placed On BIPAP/CPAP/ Non-Invasive Ventilation? No    If no must comment. Facial area red/color change? No           If YES are Blister/Lesion present? No   If yes must notify nursing staff  BIPAP/CPAP skin barrier?   Yes    Skin barrier type:mepilexlite       Comments:  Remains on from previous shift 10/5/50%        Jessica Devries RCP

## 2022-02-24 NOTE — PROGRESS NOTES
Vascular Surgery Progress Note    CC:  left subclavian artery occlusion, submassive pulmonary emboli and SMA embolus    HISTORY:  Pt s/e. Denies abdominal or left arm pain. Tolerting diet. Had 2 bowel movements yesterday. Feeling better. Remains on heparin. IMPRESSION:  POD # 4 s/p VCF insertion, Ex Lap, SMA embolectomy, L UE embolectomy     PLAN: Vascular exam stable. Advance diet. PT/OT. Start eliquis.      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    Limb ischemia I99.8    Multiple pulmonary emboli (HCC) I26.99       Current Medications:    dextrose      sodium chloride        oxyCODONE **OR** oxyCODONE, HYDROmorphone, glucose, dextrose, glucagon (rDNA), dextrose, heparin (porcine), heparin (porcine), sodium chloride flush, sodium chloride, perflutren lipid microspheres    atorvastatin  10 mg Oral Daily    apixaban  10 mg Oral BID    Followed by   Betsy Mas ON 3/3/2022] apixaban  5 mg Oral BID    gabapentin  300 mg Oral TID    furosemide  20 mg Oral Daily    losartan  100 mg Oral Daily    And    hydroCHLOROthiazide  25 mg Oral Daily    acetaminophen  650 mg Oral Q4H While awake    polyethylene glycol  17 g Oral Daily    sennosides-docusate sodium  2 tablet Oral BID    insulin lispro  0-12 Units SubCUTAneous TID     insulin lispro  0-6 Units SubCUTAneous Nightly    DULoxetine  20 mg Oral Daily    sodium chloride flush  5-40 mL IntraVENous 2 times per day          PHYSICAL EXAM:    Vitals:    02/24/22 0800   BP: (!) 128/49   Pulse: 75   Resp: 15   Temp:    SpO2: 95%     CONSTITUTIONAL:  awake, alert, cooperative, no apparent distress  LUNGS:  Easy, nonlabored on 5L NC  CARDIOVASCULAR: regular rate and rhythm  ABDOMEN:  soft, non-distended and tender to palpation along midline incision. Aquacel dressing in place with no strike through noted  R UE: 3+ radial, 5/5 hand  strength  L UE: upper arm incision well approximated, slight ecchymosis 3+ radial. 5/5 hand  strength  R LE: foot, warm, well perfused. + DP/PT signal  L LE: foot warm, well perfused.  2+ DP, + PT signal    LABS:    Lab Results   Component Value Date    WBC 9.7 02/24/2022    HGB 8.2 (L) 02/24/2022    HCT 27.3 (L) 02/24/2022     02/24/2022    PROTIME 12.1 02/19/2022    INR 1.1 02/19/2022    APTT 75.4 (H) 02/24/2022    K 3.6 02/24/2022    BUN 14 02/24/2022    CREATININE 0.8 02/24/2022       RADIOLOGY:

## 2022-02-24 NOTE — PROGRESS NOTES
Education not indicated   Coordination of Nutrition Care:  Continue to monitor while inpatient    Goals:  Pt to consume >75% meals/ONS       Nutrition Monitoring and Evaluation:   Food/Nutrient Intake Outcomes:  Food and Nutrient Intake,Supplement Intake  Physical Signs/Symptoms Outcomes:  Biochemical Data,Nutrition Focused Physical Findings,Skin,Weight,GI Status,Fluid Status or Edema     Discharge Planning:     Too soon to determine     Electronically signed by Galen Salgado RD, LD on 2/24/22 at 2:12 PM EST    Contact: Ext [phone]

## 2022-02-24 NOTE — PROGRESS NOTES
Subjective:  2/21/2022  78year-old woman  Seen in cardiovascular ICU earlier this morning  Registered nurse at bedside  Orally intubated  Orogastric tube draining bloody material  Oxygenating well  Awaiting to be extubated  Data reviewed in detail  2/22/2022  Patient was seen in the cardiovascular ICU earlier this morning  Extubated and off ventilator  Not requiring any supplemental oxygen  Daughter at bedside  Data reviewed in detail  Patient is fully alert  2/23/2022  Patient was seen in cardiovascular ICU earlier this morning  Currently n.p.o. Tolerating medications  Registered nurse at bedside  No flatus yet  Wants to eat  2/24/2022  Patient was seen on the cardiovascular ICU earlier this morning  Oral intake better  Pain control adequate  Data reviewed in detail  Tolerating medications  Objective:    BP (!) 120/53   Pulse 88   Temp 97.5 °F (36.4 °C) (Temporal)   Resp 17   Ht 5' 1\" (1.549 m)   Wt 223 lb 8 oz (101.4 kg)   SpO2 97%   BMI 42.23 kg/m²   Fully alert and oriented  Pupils were equal  Orogastric tube out  IJ line in the right side of the neck  Trachea midline  No adenopathy  Heart:  RRR, no murmurs, gallops, or rubs.   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

## 2022-02-24 NOTE — PROGRESS NOTES
Vascular Surgery Progress Note    CC:  left subclavian artery occlusion, submassive pulmonary emboli and SMA embolus    HISTORY:  Pt s/e. Denies abdominal or left arm pain. + flatus and BM yesterday. Tolerated regular diet. Denies nausea or emesis    IMPRESSION:  POD # 5 s/p VCF insertion, Ex Lap, SMA embolectomy, L UE embolectomy     PLAN: Vascular exam stable. Continue diet. PT/OT. Continue heparin gtt. OK for oral AC. Hypercoagulable workup per hematology.  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    Limb ischemia I99.8    Multiple pulmonary emboli (HCC) I26.99       Current Medications:    dextrose      heparin (PORCINE) Infusion 17 Units/kg/hr (02/23/22 0643)    sodium chloride        oxyCODONE **OR** oxyCODONE, HYDROmorphone, glucose, dextrose, glucagon (rDNA), dextrose, heparin (porcine), heparin (porcine), sodium chloride flush, sodium chloride, perflutren lipid microspheres    gabapentin  300 mg Oral TID    furosemide  20 mg Oral Daily    losartan  100 mg Oral Daily    And    hydroCHLOROthiazide  25 mg Oral Daily    acetaminophen  650 mg Oral Q4H While awake    polyethylene glycol  17 g Oral Daily    sennosides-docusate sodium  2 tablet Oral BID    insulin lispro  0-12 Units SubCUTAneous TID WC    insulin lispro  0-6 Units SubCUTAneous Nightly    atorvastatin  20 mg Oral Daily    DULoxetine  20 mg Oral Daily    sodium chloride flush  5-40 mL IntraVENous 2 times per day          PHYSICAL EXAM:    Vitals:    02/24/22 0630   BP: 139/60   Pulse: 85   Resp: 15   Temp:    SpO2: 95%     CONSTITUTIONAL:  awake, alert, cooperative, no apparent distress  LUNGS:  Easy, nonlabored on 5L NC  CARDIOVASCULAR: regular rate and rhythm  ABDOMEN:  soft, non-distended and tender to palpation along midline incision. Aquacel dressing in place with no strike through noted  R UE: 3+ radial, 5/5 hand  strength  L UE: upper arm incision well approximated, slight ecchymosis 3+ radial. 5/5 hand  strength  R LE: foot, warm, well perfused. + DP/PT signal  L LE: foot warm, well perfused.  2+ DP, + PT signal    LABS:    Lab Results   Component Value Date    WBC 9.7 02/24/2022    HGB 8.2 (L) 02/24/2022    HCT 27.3 (L) 02/24/2022     02/24/2022    PROTIME 12.1 02/19/2022    INR 1.1 02/19/2022    APTT 75.4 (H) 02/24/2022    K 3.6 02/24/2022    BUN 14 02/24/2022    CREATININE 0.8 02/24/2022       RADIOLOGY: parents will advise

## 2022-02-25 LAB
ALBUMIN SERPL-MCNC: 2.5 G/DL (ref 3.5–5.2)
ALP BLD-CCNC: 143 U/L (ref 35–104)
ALT SERPL-CCNC: 62 U/L (ref 0–32)
ANION GAP SERPL CALCULATED.3IONS-SCNC: 8 MMOL/L (ref 7–16)
APTT: 33.6 SEC (ref 24.5–35.1)
AST SERPL-CCNC: 65 U/L (ref 0–31)
BASOPHILS ABSOLUTE: 0.04 E9/L (ref 0–0.2)
BASOPHILS RELATIVE PERCENT: 0.5 % (ref 0–2)
BETA-2 GLYCOPROTEIN 1 IGA ANTIBODY: 19 SAU
BILIRUB SERPL-MCNC: 0.4 MG/DL (ref 0–1.2)
BUN BLDV-MCNC: 17 MG/DL (ref 6–23)
CALCIUM IONIZED: 1.25 MMOL/L (ref 1.15–1.33)
CALCIUM SERPL-MCNC: 8.9 MG/DL (ref 8.6–10.2)
CHLORIDE BLD-SCNC: 97 MMOL/L (ref 98–107)
CO2: 33 MMOL/L (ref 22–29)
CREAT SERPL-MCNC: 0.7 MG/DL (ref 0.5–1)
EOSINOPHILS ABSOLUTE: 0.39 E9/L (ref 0.05–0.5)
EOSINOPHILS RELATIVE PERCENT: 4.4 % (ref 0–6)
GFR AFRICAN AMERICAN: >60
GFR NON-AFRICAN AMERICAN: >60 ML/MIN/1.73
GLUCOSE BLD-MCNC: 108 MG/DL (ref 74–99)
HCT VFR BLD CALC: 29.2 % (ref 34–48)
HEMOGLOBIN: 8.7 G/DL (ref 11.5–15.5)
IMMATURE GRANULOCYTES #: 0.27 E9/L
IMMATURE GRANULOCYTES %: 3.1 % (ref 0–5)
LYMPHOCYTES ABSOLUTE: 1.63 E9/L (ref 1.5–4)
LYMPHOCYTES RELATIVE PERCENT: 18.6 % (ref 20–42)
MAGNESIUM: 2.1 MG/DL (ref 1.6–2.6)
MCH RBC QN AUTO: 24.5 PG (ref 26–35)
MCHC RBC AUTO-ENTMCNC: 29.8 % (ref 32–34.5)
MCV RBC AUTO: 82.3 FL (ref 80–99.9)
METER GLUCOSE: 110 MG/DL (ref 74–99)
METER GLUCOSE: 134 MG/DL (ref 74–99)
METER GLUCOSE: 135 MG/DL (ref 74–99)
METER GLUCOSE: 150 MG/DL (ref 74–99)
MONOCYTES ABSOLUTE: 0.79 E9/L (ref 0.1–0.95)
MONOCYTES RELATIVE PERCENT: 9 % (ref 2–12)
NEUTROPHILS ABSOLUTE: 5.65 E9/L (ref 1.8–7.3)
NEUTROPHILS RELATIVE PERCENT: 64.4 % (ref 43–80)
PDW BLD-RTO: 14.5 FL (ref 11.5–15)
PHOSPHORUS: 3.8 MG/DL (ref 2.5–4.5)
PLATELET # BLD: 308 E9/L (ref 130–450)
PMV BLD AUTO: 9.6 FL (ref 7–12)
POTASSIUM SERPL-SCNC: 3.8 MMOL/L (ref 3.5–5)
RBC # BLD: 3.55 E12/L (ref 3.5–5.5)
SODIUM BLD-SCNC: 138 MMOL/L (ref 132–146)
TOTAL PROTEIN: 5.3 G/DL (ref 6.4–8.3)
WBC # BLD: 8.8 E9/L (ref 4.5–11.5)

## 2022-02-25 PROCEDURE — 2700000000 HC OXYGEN THERAPY PER DAY

## 2022-02-25 PROCEDURE — 6370000000 HC RX 637 (ALT 250 FOR IP): Performed by: NURSE PRACTITIONER

## 2022-02-25 PROCEDURE — 2580000003 HC RX 258: Performed by: STUDENT IN AN ORGANIZED HEALTH CARE EDUCATION/TRAINING PROGRAM

## 2022-02-25 PROCEDURE — 85730 THROMBOPLASTIN TIME PARTIAL: CPT

## 2022-02-25 PROCEDURE — 82330 ASSAY OF CALCIUM: CPT

## 2022-02-25 PROCEDURE — 6370000000 HC RX 637 (ALT 250 FOR IP): Performed by: SURGERY

## 2022-02-25 PROCEDURE — 99233 SBSQ HOSP IP/OBS HIGH 50: CPT | Performed by: INTERNAL MEDICINE

## 2022-02-25 PROCEDURE — 6370000000 HC RX 637 (ALT 250 FOR IP): Performed by: INTERNAL MEDICINE

## 2022-02-25 PROCEDURE — 2140000000 HC CCU INTERMEDIATE R&B

## 2022-02-25 PROCEDURE — 83735 ASSAY OF MAGNESIUM: CPT

## 2022-02-25 PROCEDURE — 6370000000 HC RX 637 (ALT 250 FOR IP): Performed by: STUDENT IN AN ORGANIZED HEALTH CARE EDUCATION/TRAINING PROGRAM

## 2022-02-25 PROCEDURE — 85025 COMPLETE CBC W/AUTO DIFF WBC: CPT

## 2022-02-25 PROCEDURE — 80053 COMPREHEN METABOLIC PANEL: CPT

## 2022-02-25 PROCEDURE — 94660 CPAP INITIATION&MGMT: CPT

## 2022-02-25 PROCEDURE — 36415 COLL VENOUS BLD VENIPUNCTURE: CPT

## 2022-02-25 PROCEDURE — 84100 ASSAY OF PHOSPHORUS: CPT

## 2022-02-25 PROCEDURE — 6360000002 HC RX W HCPCS: Performed by: SURGERY

## 2022-02-25 PROCEDURE — 82962 GLUCOSE BLOOD TEST: CPT

## 2022-02-25 RX ADMIN — LOSARTAN POTASSIUM 100 MG: 50 TABLET, FILM COATED ORAL at 07:58

## 2022-02-25 RX ADMIN — FUROSEMIDE 20 MG: 20 TABLET ORAL at 09:07

## 2022-02-25 RX ADMIN — ACETAMINOPHEN 650 MG: 325 TABLET ORAL at 14:35

## 2022-02-25 RX ADMIN — ACETAMINOPHEN 650 MG: 325 TABLET ORAL at 06:12

## 2022-02-25 RX ADMIN — ACETAMINOPHEN 650 MG: 325 TABLET ORAL at 18:52

## 2022-02-25 RX ADMIN — APIXABAN 10 MG: 5 TABLET, FILM COATED ORAL at 07:58

## 2022-02-25 RX ADMIN — GABAPENTIN 300 MG: 300 CAPSULE ORAL at 14:35

## 2022-02-25 RX ADMIN — SENNOSIDES AND DOCUSATE SODIUM 2 TABLET: 50; 8.6 TABLET ORAL at 20:56

## 2022-02-25 RX ADMIN — ATORVASTATIN CALCIUM 10 MG: 10 TABLET, FILM COATED ORAL at 07:59

## 2022-02-25 RX ADMIN — ACETAMINOPHEN 650 MG: 325 TABLET ORAL at 09:06

## 2022-02-25 RX ADMIN — OXYCODONE 5 MG: 5 TABLET ORAL at 20:56

## 2022-02-25 RX ADMIN — APIXABAN 10 MG: 5 TABLET, FILM COATED ORAL at 20:59

## 2022-02-25 RX ADMIN — GABAPENTIN 300 MG: 300 CAPSULE ORAL at 07:58

## 2022-02-25 RX ADMIN — HYDROCHLOROTHIAZIDE 25 MG: 25 TABLET ORAL at 09:06

## 2022-02-25 RX ADMIN — DULOXETINE HYDROCHLORIDE 20 MG: 20 CAPSULE, DELAYED RELEASE ORAL at 08:02

## 2022-02-25 RX ADMIN — SENNOSIDES AND DOCUSATE SODIUM 2 TABLET: 50; 8.6 TABLET ORAL at 07:58

## 2022-02-25 RX ADMIN — OXYCODONE 5 MG: 5 TABLET ORAL at 06:15

## 2022-02-25 RX ADMIN — Medication 10 ML: at 07:58

## 2022-02-25 RX ADMIN — POLYETHYLENE GLYCOL 3350 17 G: 17 POWDER, FOR SOLUTION ORAL at 08:01

## 2022-02-25 RX ADMIN — HYDROMORPHONE HYDROCHLORIDE 0.5 MG: 1 INJECTION, SOLUTION INTRAMUSCULAR; INTRAVENOUS; SUBCUTANEOUS at 07:58

## 2022-02-25 RX ADMIN — GABAPENTIN 300 MG: 300 CAPSULE ORAL at 20:56

## 2022-02-25 ASSESSMENT — PAIN SCALES - GENERAL
PAINLEVEL_OUTOF10: 0
PAINLEVEL_OUTOF10: 0
PAINLEVEL_OUTOF10: 7
PAINLEVEL_OUTOF10: 0
PAINLEVEL_OUTOF10: 6
PAINLEVEL_OUTOF10: 0
PAINLEVEL_OUTOF10: 5
PAINLEVEL_OUTOF10: 5
PAINLEVEL_OUTOF10: 0
PAINLEVEL_OUTOF10: 8
PAINLEVEL_OUTOF10: 0
PAINLEVEL_OUTOF10: 0

## 2022-02-25 ASSESSMENT — PAIN DESCRIPTION - PROGRESSION
CLINICAL_PROGRESSION: RESOLVED
CLINICAL_PROGRESSION: GRADUALLY IMPROVING

## 2022-02-25 ASSESSMENT — PAIN DESCRIPTION - DESCRIPTORS: DESCRIPTORS: SORE

## 2022-02-25 ASSESSMENT — PAIN DESCRIPTION - LOCATION: LOCATION: ABDOMEN;INCISION

## 2022-02-25 ASSESSMENT — PAIN DESCRIPTION - PAIN TYPE: TYPE: SURGICAL PAIN

## 2022-02-25 ASSESSMENT — PAIN DESCRIPTION - ONSET: ONSET: GRADUAL

## 2022-02-25 ASSESSMENT — PAIN - FUNCTIONAL ASSESSMENT: PAIN_FUNCTIONAL_ASSESSMENT: PREVENTS OR INTERFERES SOME ACTIVE ACTIVITIES AND ADLS

## 2022-02-25 ASSESSMENT — PAIN DESCRIPTION - FREQUENCY: FREQUENCY: INTERMITTENT

## 2022-02-25 ASSESSMENT — PAIN DESCRIPTION - ORIENTATION: ORIENTATION: MID

## 2022-02-25 NOTE — PROGRESS NOTES
Subjective:  2/21/2022  78year-old woman  Seen in cardiovascular ICU earlier this morning  Registered nurse at bedside  Orally intubated  Orogastric tube draining bloody material  Oxygenating well  Awaiting to be extubated  Data reviewed in detail  2/22/2022  Patient was seen in the cardiovascular ICU earlier this morning  Extubated and off ventilator  Not requiring any supplemental oxygen  Daughter at bedside  Data reviewed in detail  Patient is fully alert  2/23/2022  Patient was seen in cardiovascular ICU earlier this morning  Currently n.p.o. Tolerating medications  Registered nurse at bedside  No flatus yet  Wants to eat  2/24/2022  Patient was seen on the cardiovascular ICU earlier this morning  Oral intake better  Pain control adequate  Data reviewed in detail  Tolerating medications  2/25/2022  Patient was seen in cardiovascular ICU earlier this morning  Oral intake adequate  Pain control adequate  Tolerating medications  Data reviewed in detail  Objective:    BP (!) 106/51   Pulse 85   Temp 98.4 °F (36.9 °C) (Temporal)   Resp 18   Ht 5' 1\" (1.549 m)   Wt 211 lb 8 oz (95.9 kg)   SpO2 92%   BMI 39.96 kg/m²   Fully alert and oriented  Pupils were equal  Orogastric tube out  IJ line in the right side of the neck  Trachea midline  No adenopathy  Heart:  RRR, no murmurs, gallops, or rubs.   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   Overall medically stable  Patient Active Problem List   Diagnosis    Essential hypertension, benign    Lymphedema    Osteoarthritis    Hyperlipidemia    Anxiety    Acute mesenteric arterial occlusion (HCC)    Acute saddle pulmonary embolism with acute cor pulmonale (HCC)    Occlusion of left subclavian artery    Limb ischemia    Multiple pulmonary emboli (HCC)       Plan:  Currently on Eliquis loading dose 10 mg twice daily  Questions answered to her satisfaction  Advance diet and activity  Monitor labs  Long-term anticoagulant therapy will be necessary  ICU care as outlined  May transfer to [facility]        Joanie Medel MD  10:02 AM  2/25/2022

## 2022-02-25 NOTE — PROGRESS NOTES
Vascular Surgery Progress Note    CC:  left subclavian artery occlusion, submassive pulmonary emboli and SMA embolus    HISTORY:  Pt s/e. Denies abdominal or left arm pain. Tolerting diet. Working with PT/OT. No new complaints. IMPRESSION:   s/p VCF insertion, Ex Lap, SMA embolectomy, L UE embolectomy 2/19    PLAN: Vascular exam stable. Continue Ac with [facility] for transfer out of ICU from vascular standpoint. PT/OT. Remove aquacel dressing from abdomen POD 7 or is saturated.     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    Limb ischemia I99.8    Multiple pulmonary emboli (HCC) I26.99       Current Medications:    dextrose      sodium chloride        oxyCODONE **OR** oxyCODONE, HYDROmorphone, glucose, dextrose, glucagon (rDNA), dextrose, sodium chloride flush, sodium chloride, perflutren lipid microspheres    atorvastatin  10 mg Oral Daily    apixaban  10 mg Oral BID    Followed by   Keely Cap ON 3/3/2022] apixaban  5 mg Oral BID    gabapentin  300 mg Oral TID    furosemide  20 mg Oral Daily    losartan  100 mg Oral Daily    And    hydroCHLOROthiazide  25 mg Oral Daily    acetaminophen  650 mg Oral Q4H While awake    polyethylene glycol  17 g Oral Daily    sennosides-docusate sodium  2 tablet Oral BID    insulin lispro  0-12 Units SubCUTAneous TID     insulin lispro  0-6 Units SubCUTAneous Nightly    DULoxetine  20 mg Oral Daily    sodium chloride flush  5-40 mL IntraVENous 2 times per day          PHYSICAL EXAM:    Vitals:    02/25/22 0800   BP: 116/63   Pulse: 85   Resp: 11   Temp: 98.4 °F (36.9 °C)   SpO2: 93%     CONSTITUTIONAL:  awake, alert, cooperative, no apparent distress  LUNGS: Easy, nonlabored on 5L NC  CARDIOVASCULAR: regular rate and rhythm  ABDOMEN:  soft, non-distended and tender to palpation along midline incision. Aquacel dressing in place with no strike through noted   R UE: 3+ radial, 5/5 hand  strength  L UE: upper arm incision well approximated, slight ecchymosis 3+ radial. 5/5 hand  strength  R LE: foot, warm, well perfused. + DP/PT signal  L LE: foot warm, well perfused.  2+ DP, + PT signal    LABS:    Lab Results   Component Value Date    WBC 8.8 02/25/2022    HGB 8.7 (L) 02/25/2022    HCT 29.2 (L) 02/25/2022     02/25/2022    PROTIME 12.1 02/19/2022    INR 1.1 02/19/2022    APTT 33.6 02/25/2022    K 3.8 02/25/2022    BUN 17 02/25/2022    CREATININE 0.7 02/25/2022       RADIOLOGY:

## 2022-02-25 NOTE — PLAN OF CARE
Problem: Skin Integrity:  Goal: Will show no infection signs and symptoms  Description: Will show no infection signs and symptoms  Outcome: Met This Shift  Goal: Absence of new skin breakdown  Description: Absence of new skin breakdown  Outcome: Met This Shift     Problem: Falls - Risk of:  Goal: Will remain free from falls  Description: Will remain free from falls  Outcome: Met This Shift  Goal: Absence of physical injury  Description: Absence of physical injury  Outcome: Met This Shift     Problem: Pain:  Goal: Pain level will decrease  Description: Pain level will decrease  Outcome: Met This Shift  Goal: Control of acute pain  Description: Control of acute pain  Outcome: Met This Shift     Problem: Tissue Perfusion:  Goal: Peripheral tissue perfusion will improve  Description: Peripheral tissue perfusion will improve  Outcome: Met This Shift

## 2022-02-25 NOTE — PROGRESS NOTES
Janet Ryder Black 1940    SUBJECTIVE:  -Discussed results of thrombophilia panel with patient and her daughter/son   -Intermittent abdominal pain, no SOB, chest pain or N/V: tolerating eliquis.   -Playing cards in bed-hopefully DC out of ICU shortly     OBJECTIVE:  VITALS:  BP (!) 94/55   Pulse 83   Temp 98.4 °F (36.9 °C) (Temporal)   Resp 13   Ht 5' 1\" (1.549 m)   Wt 211 lb 8 oz (95.9 kg)   SpO2 94%   BMI 39.96 kg/m²    GENERAL: No acute distress, Alert and oriented   ENT:  oropharynx clear, no evidence of mucositis. NECK:  No  lymphadenopathy. LUNGS:  Clear to auscultation, no wheeze or rhonchi   CARDIOVASCULAR:  Regular rate and rhythm. No clicks, murmurs, rubs or gallops. ABDOMEN:  Soft, tender, nondistended    NEUROLOGIC:  No focal deficits.   EXTREMITIES: Generalized edema to bilateral lower extremities, without clubbing, cyanosis,     DIAGNOSTIC DATA  Lab Results   Component Value Date    WBC 8.8 02/25/2022    HGB 8.7 (L) 02/25/2022    HCT 29.2 (L) 02/25/2022    MCV 82.3 02/25/2022     02/25/2022     Recent Labs     02/23/22  0500 02/24/22  0500 02/25/22  0430    137 138   CO2 32* 33* 33*   PHOS 2.6 3.5 3.8   BUN 10 14 17   CREATININE 0.6 0.8 0.7     Lab Results   Component Value Date    ALT 62 (H) 02/25/2022    AST 65 (H) 02/25/2022    ALKPHOS 143 (H) 02/25/2022    BILITOT 0.4 02/25/2022     Lab Results   Component Value Date    LABALBU 2.5 (L) 02/25/2022     Current Facility-Administered Medications   Medication Dose Route Frequency Provider Last Rate Last Admin    atorvastatin (LIPITOR) tablet 10 mg  10 mg Oral Daily Ethyl Mary, APRN - CNP   10 mg at 02/25/22 4097    apixaban (ELIQUIS) tablet 10 mg  10 mg Oral BID Ethyl Mary, APRN - CNP   10 mg at 02/25/22 6873    Followed by    Alondra Licona ON 3/3/2022] apixaban (ELIQUIS) tablet 5 mg  5 mg Oral BID SHEY Loving - CNP        gabapentin (NEURONTIN) capsule 300 mg  300 mg Oral TID Bernardino Lopez MD   300 mg at 02/25/22 0758    furosemide (LASIX) tablet 20 mg  20 mg Oral Daily Coby England MD   20 mg at 02/25/22 2892    losartan (COZAAR) tablet 100 mg  100 mg Oral Daily Coby England MD   100 mg at 02/25/22 8005    And    hydroCHLOROthiazide (HYDRODIURIL) tablet 25 mg  25 mg Oral Daily Coby England MD   25 mg at 02/25/22 4754    oxyCODONE (ROXICODONE) immediate release tablet 5 mg  5 mg Oral Q4H PRN Coby England MD   5 mg at 02/25/22 0361    Or    oxyCODONE HCl (OXY-IR) immediate release tablet 10 mg  10 mg Oral Q4H PRN Coby England MD        HYDROmorphone (DILAUDID) injection 0.5 mg  0.5 mg IntraVENous Q4H PRN Coby England MD   0.5 mg at 02/25/22 4295    acetaminophen (TYLENOL) tablet 650 mg  650 mg Oral Q4H While awake Coby England MD   650 mg at 02/25/22 9616    polyethylene glycol (GLYCOLAX) packet 17 g  17 g Oral Daily Coby England MD   17 g at 02/25/22 0801    sennosides-docusate sodium (SENOKOT-S) 8.6-50 MG tablet 2 tablet  2 tablet Oral BID Coby England MD   2 tablet at 02/25/22 0758    insulin lispro (HUMALOG) injection vial 0-12 Units  0-12 Units SubCUTAneous TID WC oCby England MD   2 Units at 02/24/22 1158    insulin lispro (HUMALOG) injection vial 0-6 Units  0-6 Units SubCUTAneous Nightly Coby England MD   1 Units at 02/24/22 2236    glucose (GLUTOSE) 40 % oral gel 15 g  15 g Oral PRN Terrea Habermann, MD        dextrose 50 % IV solution  12.5 g IntraVENous PRN Terrea Habermann, MD        glucagon (rDNA) injection 1 mg  1 mg IntraMUSCular PRN Terrea Habermann, MD        dextrose 5 % solution  100 mL/hr IntraVENous PRN Terrea Habermann, MD        DULoxetine (CYMBALTA) extended release capsule 20 mg  20 mg Oral Daily Terrea Habermann, MD   20 mg at 02/25/22 0802    sodium chloride flush 0.9 % injection 5-40 mL  5-40 mL IntraVENous 2 times per day Terrea Habermann, MD   10 mL at 02/25/22 0758    sodium chloride flush 0.9 % injection 5-40 mL  5-40 mL IntraVENous PRN Terrea Habermann, MD        0.9 % sodium chloride infusion  25 mL IntraVENous PRN Megan Dennis MD        perflutren lipid microspheres (DEFINITY) injection 1.65 mg  1.5 mL IntraVENous ONCE PRN Megan Dennis MD         IMPRESSION:  Patient Active Problem List   Diagnosis    Essential hypertension, benign    Lymphedema    Osteoarthritis    Hyperlipidemia    Anxiety    Acute mesenteric arterial occlusion (HCC)    Acute saddle pulmonary embolism with acute cor pulmonale (HCC)    Occlusion of left subclavian artery    Limb ischemia    Multiple pulmonary emboli [facility])     Impression/Plan:  78 y.o. female with Hx of HTN, DMII, lymphedema, multiple prior DVT who was treated with short term anticoagulation (Coumadin), was not currently taking, who presented with bilateral massive PE, left subclavian artery thrombus, and SMA thrombus. She was started on a heparin drip and then taken to OR by Vascular for SMA embolectomy, IVC filter, and left arm embolectomy. Hypercoag work-up in process  -D-Dimer 4531  -Homocysteine 4.3  -DRVVT Negative  -Cardiolipin antibodies: IgG <10 (<14), IgM: 11 (<12) IgA: <10 (<11)  -AT-III Activity 64% - may be low in acute setting.  -Protein C 83%   -Protein S : 93%   -Thrombophilia:   Factor V Leiden: No mutation  Prothrombin 65276Q>A: no mutation detected  RENA-1 Genotype: Heterozygote   Factor XIII V34L: no mutation detected   Continue to monitor CBC : Hb 8.7 on 02/25/2022; Transfuse if Hb <7.0  Dr. Teodora Moreno consulted to evaluate the liver lesion on CTA. CTA triphasic liver consistent with hepatic cysts. Has transitioned to Eliquis -on 10 mg BID, continue Eliquis, D/C per Primary team, we will sign of at this time and f/up with her as OP, please call or PS for any questions. Thank you for allowing us to participate in the care of Mrs. Cleaning. Yue Arroyo [address] [place] [address]   [phone]    The patient was seen and examined, I agree with Yue Arroyo APRN-FNP's findings, assessment and plan as outlined.      Iqra Dc MD GABRIEL   HEMATOLOGY/MEDICAL [address] [address] [place] [facility]  Ray Younger [address] [facility] [id number]  Dept: 71 Ronel Garcia: [phone]

## 2022-02-25 NOTE — CARE COORDINATION
Met with pt son and daughter in room. Provided son with 30 day free Eliquis coupon. Spoke with pt about likelihood of need for rehab before going home. Explained to her that if she improves and able to go home, we can cancel rehab referrals, but its a good idea to have places in mind so we can check for bed availability. Daughter has jovan and [facility] lists I emailed her. Provided another jovan list to son. They are awaiting therapy to see today to see if she has improved.

## 2022-02-26 LAB
AFP-TUMOR MARKER: 1 NG/ML (ref 0–9)
ALBUMIN SERPL-MCNC: 3 G/DL (ref 3.5–5.2)
ALP BLD-CCNC: 160 U/L (ref 35–104)
ALT SERPL-CCNC: 96 U/L (ref 0–32)
ANION GAP SERPL CALCULATED.3IONS-SCNC: 9 MMOL/L (ref 7–16)
AST SERPL-CCNC: 86 U/L (ref 0–31)
BASOPHILS ABSOLUTE: 0.09 E9/L (ref 0–0.2)
BASOPHILS RELATIVE PERCENT: 0.9 % (ref 0–2)
BILIRUB SERPL-MCNC: 0.3 MG/DL (ref 0–1.2)
BUN BLDV-MCNC: 26 MG/DL (ref 6–23)
CALCIUM IONIZED: 1.29 MMOL/L (ref 1.15–1.33)
CALCIUM SERPL-MCNC: 9.4 MG/DL (ref 8.6–10.2)
CHLORIDE BLD-SCNC: 96 MMOL/L (ref 98–107)
CO2: 32 MMOL/L (ref 22–29)
CREAT SERPL-MCNC: 0.8 MG/DL (ref 0.5–1)
EOSINOPHILS ABSOLUTE: 0.4 E9/L (ref 0.05–0.5)
EOSINOPHILS RELATIVE PERCENT: 3.8 % (ref 0–6)
GFR AFRICAN AMERICAN: >60
GFR NON-AFRICAN AMERICAN: >60 ML/MIN/1.73
GLUCOSE BLD-MCNC: 122 MG/DL (ref 74–99)
HCT VFR BLD CALC: 32.5 % (ref 34–48)
HEMOGLOBIN: 9.8 G/DL (ref 11.5–15.5)
IMMATURE GRANULOCYTES #: 0.47 E9/L
IMMATURE GRANULOCYTES %: 4.5 % (ref 0–5)
LYMPHOCYTES ABSOLUTE: 1.85 E9/L (ref 1.5–4)
LYMPHOCYTES RELATIVE PERCENT: 17.8 % (ref 20–42)
MAGNESIUM: 2.1 MG/DL (ref 1.6–2.6)
MCH RBC QN AUTO: 24.7 PG (ref 26–35)
MCHC RBC AUTO-ENTMCNC: 30.2 % (ref 32–34.5)
MCV RBC AUTO: 81.9 FL (ref 80–99.9)
METER GLUCOSE: 116 MG/DL (ref 74–99)
METER GLUCOSE: 128 MG/DL (ref 74–99)
METER GLUCOSE: 149 MG/DL (ref 74–99)
METER GLUCOSE: 167 MG/DL (ref 74–99)
MONOCYTES ABSOLUTE: 0.97 E9/L (ref 0.1–0.95)
MONOCYTES RELATIVE PERCENT: 9.3 % (ref 2–12)
NEUTROPHILS ABSOLUTE: 6.61 E9/L (ref 1.8–7.3)
NEUTROPHILS RELATIVE PERCENT: 63.7 % (ref 43–80)
PDW BLD-RTO: 14.5 FL (ref 11.5–15)
PHOSPHORUS: 4.1 MG/DL (ref 2.5–4.5)
PLATELET # BLD: 370 E9/L (ref 130–450)
PMV BLD AUTO: 9.4 FL (ref 7–12)
POTASSIUM SERPL-SCNC: 4.2 MMOL/L (ref 3.5–5)
RBC # BLD: 3.97 E12/L (ref 3.5–5.5)
SODIUM BLD-SCNC: 137 MMOL/L (ref 132–146)
TOTAL PROTEIN: 5.9 G/DL (ref 6.4–8.3)
WBC # BLD: 10.4 E9/L (ref 4.5–11.5)

## 2022-02-26 PROCEDURE — 82330 ASSAY OF CALCIUM: CPT

## 2022-02-26 PROCEDURE — 83735 ASSAY OF MAGNESIUM: CPT

## 2022-02-26 PROCEDURE — 80053 COMPREHEN METABOLIC PANEL: CPT

## 2022-02-26 PROCEDURE — 2140000000 HC CCU INTERMEDIATE R&B

## 2022-02-26 PROCEDURE — 6370000000 HC RX 637 (ALT 250 FOR IP): Performed by: NURSE PRACTITIONER

## 2022-02-26 PROCEDURE — 85025 COMPLETE CBC W/AUTO DIFF WBC: CPT

## 2022-02-26 PROCEDURE — 36415 COLL VENOUS BLD VENIPUNCTURE: CPT

## 2022-02-26 PROCEDURE — 82962 GLUCOSE BLOOD TEST: CPT

## 2022-02-26 PROCEDURE — 84100 ASSAY OF PHOSPHORUS: CPT

## 2022-02-26 PROCEDURE — 94660 CPAP INITIATION&MGMT: CPT

## 2022-02-26 PROCEDURE — 2580000003 HC RX 258: Performed by: NURSE PRACTITIONER

## 2022-02-26 PROCEDURE — 2700000000 HC OXYGEN THERAPY PER DAY

## 2022-02-26 RX ADMIN — FUROSEMIDE 20 MG: 20 TABLET ORAL at 12:13

## 2022-02-26 RX ADMIN — APIXABAN 10 MG: 5 TABLET, FILM COATED ORAL at 09:50

## 2022-02-26 RX ADMIN — ATORVASTATIN CALCIUM 10 MG: 10 TABLET, FILM COATED ORAL at 12:14

## 2022-02-26 RX ADMIN — HYDROCHLOROTHIAZIDE 25 MG: 25 TABLET ORAL at 09:47

## 2022-02-26 RX ADMIN — POLYETHYLENE GLYCOL 3350 17 G: 17 POWDER, FOR SOLUTION ORAL at 09:49

## 2022-02-26 RX ADMIN — GABAPENTIN 300 MG: 300 CAPSULE ORAL at 21:14

## 2022-02-26 RX ADMIN — ACETAMINOPHEN 650 MG: 325 TABLET ORAL at 05:42

## 2022-02-26 RX ADMIN — GABAPENTIN 300 MG: 300 CAPSULE ORAL at 09:47

## 2022-02-26 RX ADMIN — ACETAMINOPHEN 650 MG: 325 TABLET ORAL at 09:57

## 2022-02-26 RX ADMIN — LOSARTAN POTASSIUM 100 MG: 50 TABLET, FILM COATED ORAL at 09:47

## 2022-02-26 RX ADMIN — Medication 5 ML: at 09:56

## 2022-02-26 RX ADMIN — GABAPENTIN 300 MG: 300 CAPSULE ORAL at 15:03

## 2022-02-26 RX ADMIN — ACETAMINOPHEN 650 MG: 325 TABLET ORAL at 18:41

## 2022-02-26 RX ADMIN — Medication 10 ML: at 21:14

## 2022-02-26 RX ADMIN — APIXABAN 10 MG: 5 TABLET, FILM COATED ORAL at 21:14

## 2022-02-26 RX ADMIN — INSULIN LISPRO 2 UNITS: 100 INJECTION, SOLUTION INTRAVENOUS; SUBCUTANEOUS at 12:15

## 2022-02-26 RX ADMIN — SENNOSIDES AND DOCUSATE SODIUM 2 TABLET: 50; 8.6 TABLET ORAL at 21:15

## 2022-02-26 RX ADMIN — ACETAMINOPHEN 650 MG: 325 TABLET ORAL at 21:15

## 2022-02-26 RX ADMIN — ACETAMINOPHEN 650 MG: 325 TABLET ORAL at 15:03

## 2022-02-26 RX ADMIN — SENNOSIDES AND DOCUSATE SODIUM 2 TABLET: 50; 8.6 TABLET ORAL at 09:47

## 2022-02-26 RX ADMIN — DULOXETINE HYDROCHLORIDE 20 MG: 20 CAPSULE, DELAYED RELEASE ORAL at 12:13

## 2022-02-26 RX ADMIN — INSULIN LISPRO 1 UNITS: 100 INJECTION, SOLUTION INTRAVENOUS; SUBCUTANEOUS at 21:15

## 2022-02-26 ASSESSMENT — PAIN DESCRIPTION - PAIN TYPE
TYPE: SURGICAL PAIN;ACUTE PAIN
TYPE: SURGICAL PAIN
TYPE: ACUTE PAIN;SURGICAL PAIN

## 2022-02-26 ASSESSMENT — PAIN DESCRIPTION - LOCATION
LOCATION: ABDOMEN

## 2022-02-26 ASSESSMENT — PAIN - FUNCTIONAL ASSESSMENT: PAIN_FUNCTIONAL_ASSESSMENT: ACTIVITIES ARE NOT PREVENTED

## 2022-02-26 ASSESSMENT — PAIN SCALES - GENERAL
PAINLEVEL_OUTOF10: 6
PAINLEVEL_OUTOF10: 6
PAINLEVEL_OUTOF10: 2
PAINLEVEL_OUTOF10: 6
PAINLEVEL_OUTOF10: 0
PAINLEVEL_OUTOF10: 0
PAINLEVEL_OUTOF10: 6

## 2022-02-26 ASSESSMENT — PAIN DESCRIPTION - ORIENTATION: ORIENTATION: MID

## 2022-02-26 ASSESSMENT — PAIN DESCRIPTION - DESCRIPTORS: DESCRIPTORS: ACHING;DISCOMFORT;BURNING

## 2022-02-26 ASSESSMENT — PAIN DESCRIPTION - PROGRESSION: CLINICAL_PROGRESSION: NOT CHANGED

## 2022-02-26 ASSESSMENT — PAIN DESCRIPTION - FREQUENCY: FREQUENCY: CONTINUOUS

## 2022-02-26 ASSESSMENT — PAIN DESCRIPTION - ONSET: ONSET: ON-GOING

## 2022-02-26 NOTE — PROGRESS NOTES
Subjective:  2/21/2022  78year-old woman  Seen in cardiovascular ICU earlier this morning  Registered nurse at bedside  Orally intubated  Orogastric tube draining bloody material  Oxygenating well  Awaiting to be extubated  Data reviewed in detail  2/22/2022  Patient was seen in the cardiovascular ICU earlier this morning  Extubated and off ventilator  Not requiring any supplemental oxygen  Daughter at bedside  Data reviewed in detail  Patient is fully alert  2/23/2022  Patient was seen in cardiovascular ICU earlier this morning  Currently n.p.o. Tolerating medications  Registered nurse at bedside  No flatus yet  Wants to eat  2/24/2022  Patient was seen on the cardiovascular ICU earlier this morning  Oral intake better  Pain control adequate  Data reviewed in detail  Tolerating medications  2/25/2022  Patient was seen in cardiovascular ICU earlier this morning  Oral intake adequate  Pain control adequate  Tolerating medications  Data reviewed in detail  2/26/2022  Patient was seen on the floor earlier this morning  On supplemental oxygen of 4 L through nasal cannula  Pain control adequate  Oral intake adequate  Tolerating medications  Data reviewed in detail  Objective:    /69   Pulse 93   Temp 97.7 °F (36.5 °C) (Oral)   Resp 16   Ht 5' 1\" (1.549 m)   Wt 206 lb 1.6 oz (93.5 kg)   SpO2 97%   BMI 38.94 kg/m²   Fully alert and oriented  Pupils were equal  Orogastric tube out  IJ line is out  Trachea midline  No adenopathy  Heart:  RRR, no murmurs, gallops, or rubs.   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   Overall medically stable  Patient Active Problem List   Diagnosis    Essential hypertension, benign    Lymphedema    Osteoarthritis    Hyperlipidemia    Anxiety    Acute mesenteric arterial occlusion (HCC)    Acute saddle pulmonary embolism with acute cor pulmonale (HCC)    Occlusion of left subclavian artery    Limb ischemia    Multiple pulmonary emboli (HCC)       Plan:  Currently on Eliquis loading dose 10 mg twice daily  Questions answered to her satisfaction  Advance diet and activity  Monitor labs  Long-term anticoagulant therapy will be necessary        Maritza Horton MD  12:15 PM  2/26/2022

## 2022-02-26 NOTE — PROGRESS NOTES
Date: 2/25/2022    Time: 11:48 PM    Patient Placed On BIPAP/CPAP/ Non-Invasive Ventilation? Yes    If no must comment. Facial area red/color change? No           If YES are Blister/Lesion present? No   If yes must notify nursing staff  BIPAP/CPAP skin barrier?   Yes    Skin barrier type:mepilexlite       Comments:        Howard Theodore RCP

## 2022-02-26 NOTE — PROGRESS NOTES
Vascular Surgery Progress Note    CC:  left subclavian artery occlusion, submassive pulmonary emboli and SMA embolus    HISTORY:  Pt s/e. Denies abdominal or left arm pain. Tolerting diet. Working with PT/OT. No new complaints. , transferred out of ICU. Removed abdominal midline dressing today. IMPRESSION:   s/p VCF insertion, Ex Lap, SMA embolectomy, L UE embolectomy 2/19    PLAN: Vascular exam stable. Continue Ac with lgen. PT/OT. Aquacel dressing removed at bedside.      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    Limb ischemia I99.8    Multiple pulmonary emboli (HCC) I26.99       Current Medications:    dextrose      sodium chloride        oxyCODONE **OR** oxyCODONE, glucose, dextrose, glucagon (rDNA), dextrose, sodium chloride flush, sodium chloride    atorvastatin  10 mg Oral Daily    apixaban  10 mg Oral BID    Followed by   Lonza Bent ON 3/3/2022] apixaban  5 mg Oral BID    gabapentin  300 mg Oral TID    furosemide  20 mg Oral Daily    losartan  100 mg Oral Daily    And    hydroCHLOROthiazide  25 mg Oral Daily    acetaminophen  650 mg Oral Q4H While awake    polyethylene glycol  17 g Oral Daily    sennosides-docusate sodium  2 tablet Oral BID    insulin lispro  0-12 Units SubCUTAneous TID WC    insulin lispro  0-6 Units SubCUTAneous Nightly    DULoxetine  20 mg Oral Daily    sodium chloride flush  5-40 mL IntraVENous 2 times per day          PHYSICAL EXAM:    Vitals:    02/26/22 0020   BP: 118/66   Pulse: 72   Resp: 14   Temp: 99 °F (37.2 °C)   SpO2: 100%     CONSTITUTIONAL:  awake, alert, cooperative, no apparent distress  LUNGS:  Easy, nonlabored on 5L NC  CARDIOVASCULAR: regular rate and rhythm  ABDOMEN:  soft, non-distended and tender to palpation along midline incision. Aquacel dressing removed, incision C/D/I, closed with staples  R UE: 3+ radial, 5/5 hand  strength  L UE: upper arm incision well approximated, slight ecchymosis 3+ radial. 5/5 hand  strength  R LE: foot, warm, well perfused. + DP/PT signal  L LE: foot warm, well perfused.  2+ DP, + PT signal    LABS:    Lab Results   Component Value Date    WBC 10.4 02/26/2022    HGB 9.8 (L) 02/26/2022    HCT 32.5 (L) 02/26/2022     02/26/2022    PROTIME 12.1 02/19/2022    INR 1.1 02/19/2022    APTT 33.6 02/25/2022    K 3.8 02/25/2022    BUN 17 02/25/2022    CREATININE 0.7 02/25/2022       RADIOLOGY:

## 2022-02-26 NOTE — PATIENT CARE CONFERENCE
[facility] Quality Flow/Interdisciplinary Rounds Progress Note        Quality Flow Rounds held on February 26, 2022    Disciplines Attending:  Bedside Nurse, ,  and Nursing Unit Leadership    Litzy Hopkins was admitted on 2/19/2022 12:29 PM    Anticipated Discharge Date:  Expected Discharge Date: 02/28/22    Disposition:    Luis Score:  Luis Scale Score: 17    Readmission Risk              Risk of Unplanned Readmission:  15           Discussed patient goal for the day, patient clinical progression, and barriers to discharge.   The following Goal(s) of the Day/Commitment(s) have been identified:  Diagnostics - Report Results      Donny Mishra RN  February 26, 2022

## 2022-02-27 LAB
ALBUMIN SERPL-MCNC: 2.6 G/DL (ref 3.5–5.2)
ALP BLD-CCNC: 132 U/L (ref 35–104)
ALT SERPL-CCNC: 66 U/L (ref 0–32)
ANION GAP SERPL CALCULATED.3IONS-SCNC: 7 MMOL/L (ref 7–16)
ANISOCYTOSIS: ABNORMAL
AST SERPL-CCNC: 41 U/L (ref 0–31)
ATYPICAL LYMPHOCYTE RELATIVE PERCENT: 0.9 % (ref 0–4)
BASOPHILS ABSOLUTE: 0 E9/L (ref 0–0.2)
BASOPHILS RELATIVE PERCENT: 0.8 % (ref 0–2)
BILIRUB SERPL-MCNC: 0.3 MG/DL (ref 0–1.2)
BUN BLDV-MCNC: 24 MG/DL (ref 6–23)
CALCIUM IONIZED: 1.27 MMOL/L (ref 1.15–1.33)
CALCIUM SERPL-MCNC: 8.7 MG/DL (ref 8.6–10.2)
CHLORIDE BLD-SCNC: 98 MMOL/L (ref 98–107)
CO2: 35 MMOL/L (ref 22–29)
CREAT SERPL-MCNC: 0.8 MG/DL (ref 0.5–1)
EOSINOPHILS ABSOLUTE: 0.27 E9/L (ref 0.05–0.5)
EOSINOPHILS RELATIVE PERCENT: 2.6 % (ref 0–6)
GFR AFRICAN AMERICAN: >60
GFR NON-AFRICAN AMERICAN: >60 ML/MIN/1.73
GLUCOSE BLD-MCNC: 104 MG/DL (ref 74–99)
HCT VFR BLD CALC: 29.4 % (ref 34–48)
HEMOGLOBIN: 8.8 G/DL (ref 11.5–15.5)
HYPOCHROMIA: ABNORMAL
LYMPHOCYTES ABSOLUTE: 2.75 E9/L (ref 1.5–4)
LYMPHOCYTES RELATIVE PERCENT: 26.1 % (ref 20–42)
MAGNESIUM: 2 MG/DL (ref 1.6–2.6)
MCH RBC QN AUTO: 24.4 PG (ref 26–35)
MCHC RBC AUTO-ENTMCNC: 29.9 % (ref 32–34.5)
MCV RBC AUTO: 81.4 FL (ref 80–99.9)
METER GLUCOSE: 121 MG/DL (ref 74–99)
METER GLUCOSE: 125 MG/DL (ref 74–99)
METER GLUCOSE: 180 MG/DL (ref 74–99)
METER GLUCOSE: 232 MG/DL (ref 74–99)
MONOCYTES ABSOLUTE: 0.41 E9/L (ref 0.1–0.95)
MONOCYTES RELATIVE PERCENT: 4.3 % (ref 2–12)
MYELOCYTE PERCENT: 2.6 % (ref 0–0)
NEUTROPHILS ABSOLUTE: 6.73 E9/L (ref 1.8–7.3)
NEUTROPHILS RELATIVE PERCENT: 63.5 % (ref 43–80)
OVALOCYTES: ABNORMAL
PDW BLD-RTO: 14.7 FL (ref 11.5–15)
PHOSPHORUS: 3.3 MG/DL (ref 2.5–4.5)
PLATELET # BLD: 387 E9/L (ref 130–450)
PMV BLD AUTO: 9.3 FL (ref 7–12)
POIKILOCYTES: ABNORMAL
POLYCHROMASIA: ABNORMAL
POTASSIUM SERPL-SCNC: 4.1 MMOL/L (ref 3.5–5)
RBC # BLD: 3.61 E12/L (ref 3.5–5.5)
SODIUM BLD-SCNC: 140 MMOL/L (ref 132–146)
TOTAL PROTEIN: 5.4 G/DL (ref 6.4–8.3)
WBC # BLD: 10.2 E9/L (ref 4.5–11.5)

## 2022-02-27 PROCEDURE — 36415 COLL VENOUS BLD VENIPUNCTURE: CPT

## 2022-02-27 PROCEDURE — 84100 ASSAY OF PHOSPHORUS: CPT

## 2022-02-27 PROCEDURE — 99024 POSTOP FOLLOW-UP VISIT: CPT | Performed by: SURGERY

## 2022-02-27 PROCEDURE — 2700000000 HC OXYGEN THERAPY PER DAY

## 2022-02-27 PROCEDURE — 82330 ASSAY OF CALCIUM: CPT

## 2022-02-27 PROCEDURE — 6360000002 HC RX W HCPCS: Performed by: INTERNAL MEDICINE

## 2022-02-27 PROCEDURE — 2500000003 HC RX 250 WO HCPCS: Performed by: INTERNAL MEDICINE

## 2022-02-27 PROCEDURE — 6370000000 HC RX 637 (ALT 250 FOR IP): Performed by: NURSE PRACTITIONER

## 2022-02-27 PROCEDURE — 83735 ASSAY OF MAGNESIUM: CPT

## 2022-02-27 PROCEDURE — 80053 COMPREHEN METABOLIC PANEL: CPT

## 2022-02-27 PROCEDURE — 82962 GLUCOSE BLOOD TEST: CPT

## 2022-02-27 PROCEDURE — 94660 CPAP INITIATION&MGMT: CPT

## 2022-02-27 PROCEDURE — 85025 COMPLETE CBC W/AUTO DIFF WBC: CPT

## 2022-02-27 PROCEDURE — 2580000003 HC RX 258: Performed by: INTERNAL MEDICINE

## 2022-02-27 PROCEDURE — 2140000000 HC CCU INTERMEDIATE R&B

## 2022-02-27 PROCEDURE — 2580000003 HC RX 258: Performed by: NURSE PRACTITIONER

## 2022-02-27 RX ORDER — DULOXETIN HYDROCHLORIDE 20 MG/1
20 CAPSULE, DELAYED RELEASE ORAL EVERY OTHER DAY
Status: DISCONTINUED | OUTPATIENT
Start: 2022-02-28 | End: 2022-02-28 | Stop reason: HOSPADM

## 2022-02-27 RX ORDER — BUMETANIDE 0.25 MG/ML
1 INJECTION, SOLUTION INTRAMUSCULAR; INTRAVENOUS ONCE
Status: COMPLETED | OUTPATIENT
Start: 2022-02-27 | End: 2022-02-27

## 2022-02-27 RX ORDER — SODIUM FERRIC GLUCONATE COMPLEX IN SUCROSE 12.5 MG/ML
125 INJECTION INTRAVENOUS ONCE
Status: DISCONTINUED | OUTPATIENT
Start: 2022-02-27 | End: 2022-02-27 | Stop reason: SDUPTHER

## 2022-02-27 RX ADMIN — Medication 5 ML: at 10:05

## 2022-02-27 RX ADMIN — GABAPENTIN 300 MG: 300 CAPSULE ORAL at 15:52

## 2022-02-27 RX ADMIN — FUROSEMIDE 20 MG: 20 TABLET ORAL at 15:51

## 2022-02-27 RX ADMIN — GABAPENTIN 300 MG: 300 CAPSULE ORAL at 08:43

## 2022-02-27 RX ADMIN — INSULIN LISPRO 2 UNITS: 100 INJECTION, SOLUTION INTRAVENOUS; SUBCUTANEOUS at 20:58

## 2022-02-27 RX ADMIN — GABAPENTIN 300 MG: 300 CAPSULE ORAL at 20:58

## 2022-02-27 RX ADMIN — ATORVASTATIN CALCIUM 10 MG: 10 TABLET, FILM COATED ORAL at 08:43

## 2022-02-27 RX ADMIN — HYDROCHLOROTHIAZIDE 25 MG: 25 TABLET ORAL at 08:50

## 2022-02-27 RX ADMIN — SODIUM CHLORIDE 125 MG: 9 INJECTION, SOLUTION INTRAVENOUS at 13:01

## 2022-02-27 RX ADMIN — BUMETANIDE 1 MG: 0.25 INJECTION, SOLUTION INTRAMUSCULAR; INTRAVENOUS at 11:27

## 2022-02-27 RX ADMIN — ACETAMINOPHEN 650 MG: 325 TABLET ORAL at 06:04

## 2022-02-27 RX ADMIN — ACETAMINOPHEN 650 MG: 325 TABLET ORAL at 10:06

## 2022-02-27 RX ADMIN — SENNOSIDES AND DOCUSATE SODIUM 2 TABLET: 50; 8.6 TABLET ORAL at 08:44

## 2022-02-27 RX ADMIN — Medication 10 ML: at 21:04

## 2022-02-27 RX ADMIN — LOSARTAN POTASSIUM 100 MG: 50 TABLET, FILM COATED ORAL at 08:50

## 2022-02-27 RX ADMIN — INSULIN LISPRO 2 UNITS: 100 INJECTION, SOLUTION INTRAVENOUS; SUBCUTANEOUS at 13:03

## 2022-02-27 RX ADMIN — APIXABAN 10 MG: 5 TABLET, FILM COATED ORAL at 20:58

## 2022-02-27 RX ADMIN — APIXABAN 10 MG: 5 TABLET, FILM COATED ORAL at 08:44

## 2022-02-27 RX ADMIN — SENNOSIDES AND DOCUSATE SODIUM 2 TABLET: 50; 8.6 TABLET ORAL at 20:58

## 2022-02-27 RX ADMIN — ACETAMINOPHEN 650 MG: 325 TABLET ORAL at 18:11

## 2022-02-27 RX ADMIN — ACETAMINOPHEN 650 MG: 325 TABLET ORAL at 15:50

## 2022-02-27 ASSESSMENT — PAIN SCALES - GENERAL
PAINLEVEL_OUTOF10: 3
PAINLEVEL_OUTOF10: 6
PAINLEVEL_OUTOF10: 6

## 2022-02-27 ASSESSMENT — PAIN DESCRIPTION - ORIENTATION
ORIENTATION: MID

## 2022-02-27 ASSESSMENT — PAIN DESCRIPTION - PAIN TYPE
TYPE: ACUTE PAIN
TYPE: SURGICAL PAIN
TYPE: SURGICAL PAIN

## 2022-02-27 ASSESSMENT — PAIN DESCRIPTION - LOCATION
LOCATION: ABDOMEN

## 2022-02-27 NOTE — PROGRESS NOTES
History of Present Illness:   58-year-old male presenting to [facility] emergency department on 8/1/2020 with a complaint of repeat episodes of nausea and vomiting. Patient was seen on 7/31/2020 for the same and sent home. Patient had a positive urine drug screen for cannabis. Patient admits to discontinue cannabis use approximately 3 days ago. Patient also has a history of cannabis induced gastroparesis proven by nuclear medicine gastric emptying study several years ago. Patient had previously been on erythromycin and metoclopramide. Patient discontinued these for unknown reason. When specifically asked the patient stated that he had no adverse effects to these medications. Patient does have home ondansetron but this is been of little benefit for him during this episode. Patient reports 30 episodes of nausea and vomiting last 24 hours. Patient does note that he has had some blood-streaked emesis. Patient has a history of Alexandra-Lynch tear x2 in the past proven by EGD. Patient denies headache, fever, chills, abdominal pain, diarrhea, melena, hematochezia. In the emergency department the patient was noted to have labile blood pressures ranging between 133/88 and 170/117. AST and ALT mildly elevated and consistent with alcoholic hepatitis. Patient was treated with ondansetron, haloperidol, diphenhydramine x2. Patient given 1 L LR bolus in the emergency department. Patient treated with dexamethasone x1, subcutaneous sumatriptan x1, scheduled IV metoclopramide, continuous infusion of LR IVF. Today: Patient with improved bowel sounds. Only 6 episodes of vomiting since yesterday. This likely represents cannabis induced gastroparesis. Continue metoclopramide. Advance to full liquid diet. Add viscous lidocaine for esophageal pain from retching. Hemoglobin stable from what was likely a Alexandra-Lynch tear. Comprehensive review of systems negative unless stated above.     Past Medical Vascular Surgery Progress Note    CC:  left subclavian artery occlusion, submassive pulmonary emboli and SMA embolus    HISTORY:  Pt s/e. Denies any abdominal pain. Tolerated a regular diet. No nausea no vomiting. Vascular exam stable.     IMPRESSION:   s/p VCF insertion, Ex Lap, SMA embolectomy, L UE embolectomy 2/19    PLAN: Continue to monitor bilateral lower extremity and left upper extremity neurovascular exam  Continue to monitor abdominal exam    Patient Active Problem List   Diagnosis Code    Essential hypertension, benign I10    Lymphedema I89.0    Osteoarthritis M19.90    Hyperlipidemia E78.5    Anxiety F41.9    Acute mesenteric arterial occlusion ([facility] [address] K55.069    Acute saddle pulmonary embolism with acute cor pulmonale (HCC) I26.02    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    Limb ischemia I99.8    Multiple pulmonary emboli (HCC) I26.99       Current Medications:    dextrose      sodium chloride        oxyCODONE **OR** oxyCODONE, glucose, dextrose, glucagon (rDNA), dextrose, sodium chloride flush, sodium chloride    atorvastatin  10 mg Oral Daily    apixaban  10 mg Oral BID    Followed by   Keely Wong ON 3/3/2022] apixaban  5 mg Oral BID    gabapentin  300 mg Oral TID    furosemide  20 mg Oral Daily    losartan  100 mg Oral Daily    And    hydroCHLOROthiazide  25 mg Oral Daily    acetaminophen  650 mg Oral Q4H While awake    polyethylene glycol  17 g Oral Daily    sennosides-docusate sodium  2 tablet Oral BID    insulin lispro  0-12 Units SubCUTAneous TID WC    insulin lispro  0-6 Units SubCUTAneous Nightly    DULoxetine  20 mg Oral Daily    sodium chloride flush  5-40 mL IntraVENous 2 times per day          PHYSICAL EXAM:    Vitals:    02/27/22 0025   BP: 132/61   Pulse: 84   Resp: 20   Temp: 96.6 °F (35.9 °C)   SpO2: 100%     CONSTITUTIONAL:  awake, alert, cooperative, no apparent distress  LUNGS:  Easy, nonlabored on 5L NC  CARDIOVASCULAR: regular rate and rhythm  ABDOMEN: History:  Cannabis induced gastroparesis  Cannabis abuse  HTN  Esophagitis, GERD  History of C. difficile colitis    Physical Exam:  General: Young black male, sitting up in bed, fatigued appearing, no acute distress  HEENT: NCAT, dry mucous membranes  Skin: No rash noted  Cardio: RRR, normal S1/S2, no rubs, no gallops, no murmurs  Pulm: Non labored respirations on room air, LCAB, no wheezing, no rales, no rhonchi  GI: Soft, Nt, Nd, normal bowel sounds, no masses noted  Extremity: Atraumatic, no deformities, no edema  Neuro: Alert, oriented, moving all extremities, no focal deficits noted  Psych: Pleasant, cooperative, normal range of affect    I have personally reviewed all current data for this patient. Assessment and Plan:    #Cyclic vomiting syndrome, cannabis hyperemesis?,  Cannabis induced gastroparesis: Recurrent history of episodes nearly identical to this. All surrounding cannabis use. Prior nuclear medicine gastric emptying study consistent with gastroparesis that is likely cannabis induced. Patient continues to smoke cannabis and positive urine drug screen. Patient came down to smoking cannabis 4 days ago.  -Admit to observation  -LR IVF  -Sumatriptan subcutaneous x1  -Scheduled metoclopramide IV, transition to oral once patient is able to take oral better  -Dexamethasone IV x1  -Viscous lidocaine oral as needed  -Advance to full liquid diet  -LR IVF  -Daily labs    #Coffee-ground emesis, Alexandra-Lynch tear?:  Coffee-ground emesis. History Alexandra-Lynch tear. Repeated episodes of vomiting.  -Pantoprazole IV  -Gastric occult blood assay  -Trend hemoglobin, has been stable  -Treat vomiting as above    #Alcoholic hepatitis?:  AST and ALT elevation consistent with alcoholic hepatitis.   No hyperbilirubinemia.  -Trend LFTs    #HTN: Not on home antihypertensive    Full Code    Inpatient for above    SCDs for VTE prevention    Please call [phone] for questions or concerns soft, non-distended and tender to palpation along midline incision. Aquacel dressing removed, incision C/D/I, closed with staples  R UE: 2+ radial, 5/5 hand  strength  L UE: upper arm incision well approximated, 2+ radial/ulnar. 5/5 hand  strength, sensation intact  R LE: foot, warm, well perfused. Multiphasic DP/PT  L LE: foot warm, well perfused. Multiphasic DP/PT    LABS:    Lab Results   Component Value Date    WBC 10.2 02/27/2022    HGB 8.8 (L) 02/27/2022    HCT 29.4 (L) 02/27/2022     02/27/2022    PROTIME 12.1 02/19/2022    INR 1.1 02/19/2022    APTT 33.6 02/25/2022    K 4.2 02/26/2022    BUN 26 (H) 02/26/2022    CREATININE 0.8 02/26/2022       RADIOLOGY:    This patient was seen and examined at the bedside. Overall she is doing remarkably well. She has a nice palpable radial pulse. Motor and sensation are intact. Bilateral palpable femorals. The incision of the abdomen is well approximated. Both lower extremities are pink to touch. Assessment plan. #1 status post embolectomy of the SMA combined with a left upper extremity embolectomy. Continue with current medical management.     Neda Walls

## 2022-02-27 NOTE — PATIENT CARE CONFERENCE
[facility] Quality Flow/Interdisciplinary Rounds Progress Note        Quality Flow Rounds held on February 27, 2022    Disciplines Attending:  Bedside Nurse, ,  and Nursing Unit Leadership    Gutierrez Brown was admitted on 2/19/2022 12:29 PM    Anticipated Discharge Date:  Expected Discharge Date: 02/28/22    Disposition:    Luis Score:  Luis Scale Score: 15    Readmission Risk              Risk of Unplanned Readmission:  18           Discussed patient goal for the day, patient clinical progression, and barriers to discharge.   The following Goal(s) of the Day/Commitment(s) have been identified:  Diagnostics - Report Results      Yue Talbot RN  February 27, 2022

## 2022-02-27 NOTE — PROGRESS NOTES
Date: 2/26/2022    Time: 9:38 PM    Patient Placed On BIPAP/CPAP/ Non-Invasive Ventilation? Yes    If no must comment. Facial area red/color change? No           If YES are Blister/Lesion present? No   If yes must notify nursing staff  BIPAP/CPAP skin barrier?   Yes    Skin barrier type:mepilexlite       Comments:        Precilla Seat, POORNIMA

## 2022-02-27 NOTE — PROGRESS NOTES
Subjective:  2/21/2022  78year-old woman  Seen in cardiovascular ICU earlier this morning  Registered nurse at bedside  Orally intubated  Orogastric tube draining bloody material  Oxygenating well  Awaiting to be extubated  Data reviewed in detail  2/22/2022  Patient was seen in the cardiovascular ICU earlier this morning  Extubated and off ventilator  Not requiring any supplemental oxygen  Daughter at bedside  Data reviewed in detail  Patient is fully alert  2/23/2022  Patient was seen in cardiovascular ICU earlier this morning  Currently n.p.o. Tolerating medications  Registered nurse at bedside  No flatus yet  Wants to eat  2/24/2022  Patient was seen on the cardiovascular ICU earlier this morning  Oral intake better  Pain control adequate  Data reviewed in detail  Tolerating medications  2/25/2022  Patient was seen in cardiovascular ICU earlier this morning  Oral intake adequate  Pain control adequate  Tolerating medications  Data reviewed in detail  2/26/2022  Patient was seen on the floor earlier this morning  On supplemental oxygen of 4 L through nasal cannula  Pain control adequate  Oral intake adequate  Tolerating medications  Data reviewed in detail  2/27/2022  Patient was seen on the floor earlier this morning  Remains a stable  On supplemental oxygen 4 L nasal cannula  Tolerating medications  Pain control adequate  Oral intake adequate  Objective:    BP (!) 142/67 Comment: pt just moved from chair to bed  Pulse 78   Temp 96.6 °F (35.9 °C) (Temporal)   Resp 18   Ht 5' 1\" (1.549 m)   Wt 196 lb 8 oz (89.1 kg)   SpO2 95%   BMI 37.13 kg/m²   Fully alert and oriented  Pupils were equal  Orogastric tube out  IJ line is out  Trachea midline  No adenopathy  Heart:  RRR, no murmurs, gallops, or rubs.   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   Overall medically stable  Patient Active Problem List   Diagnosis    Essential hypertension, benign    Lymphedema    Osteoarthritis    Hyperlipidemia    Anxiety    Acute mesenteric arterial occlusion (HCC)    Acute saddle pulmonary embolism with acute cor pulmonale (HCC)    Occlusion of left subclavian artery    Limb ischemia    Multiple pulmonary emboli ([address]       Plan:   Additional dose of IV diuretics  Currently on Eliquis loading dose 10 mg twice daily  Questions answered to her satisfaction  Advance diet and activity  Monitor labs  Long-term anticoagulant therapy will be necessary        Erlinda Berry MD  12:14 PM  2/27/2022

## 2022-02-28 ENCOUNTER — HOSPITAL ENCOUNTER (INPATIENT)
Age: 82
LOS: 4 days | Discharge: NURSING, REHAB OR LONG-TERM CARE | DRG: 698 | End: 2022-03-05
Attending: EMERGENCY MEDICINE | Admitting: INTERNAL MEDICINE
Payer: MEDICARE

## 2022-02-28 VITALS
HEART RATE: 80 BPM | TEMPERATURE: 97.4 F | RESPIRATION RATE: 20 BRPM | BODY MASS INDEX: 37.12 KG/M2 | HEIGHT: 61 IN | DIASTOLIC BLOOD PRESSURE: 53 MMHG | OXYGEN SATURATION: 94 % | SYSTOLIC BLOOD PRESSURE: 110 MMHG | WEIGHT: 196.6 LBS

## 2022-02-28 DIAGNOSIS — N39.0 URINARY TRACT INFECTION WITH HEMATURIA, SITE UNSPECIFIED: ICD-10-CM

## 2022-02-28 DIAGNOSIS — R31.9 URINARY TRACT INFECTION WITH HEMATURIA, SITE UNSPECIFIED: ICD-10-CM

## 2022-02-28 DIAGNOSIS — M54.9 ACUTE BACK PAIN, UNSPECIFIED BACK LOCATION, UNSPECIFIED BACK PAIN LATERALITY: Primary | ICD-10-CM

## 2022-02-28 DIAGNOSIS — I26.99 OTHER PULMONARY EMBOLISM WITHOUT ACUTE COR PULMONALE, UNSPECIFIED CHRONICITY (HCC): ICD-10-CM

## 2022-02-28 LAB
ALBUMIN SERPL-MCNC: 2.9 G/DL (ref 3.5–5.2)
ALBUMIN SERPL-MCNC: 3.5 G/DL (ref 3.5–5.2)
ALP BLD-CCNC: 123 U/L (ref 35–104)
ALP BLD-CCNC: 135 U/L (ref 35–104)
ALT SERPL-CCNC: 49 U/L (ref 0–32)
ALT SERPL-CCNC: 51 U/L (ref 0–32)
ANION GAP SERPL CALCULATED.3IONS-SCNC: 10 MMOL/L (ref 7–16)
ANION GAP SERPL CALCULATED.3IONS-SCNC: 12 MMOL/L (ref 7–16)
APTT: 35.8 SEC (ref 24.5–35.1)
AST SERPL-CCNC: 23 U/L (ref 0–31)
AST SERPL-CCNC: 23 U/L (ref 0–31)
BASOPHILS ABSOLUTE: 0.06 E9/L (ref 0–0.2)
BASOPHILS ABSOLUTE: 0.07 E9/L (ref 0–0.2)
BASOPHILS RELATIVE PERCENT: 0.4 % (ref 0–2)
BASOPHILS RELATIVE PERCENT: 0.7 % (ref 0–2)
BILIRUB SERPL-MCNC: 0.3 MG/DL (ref 0–1.2)
BILIRUB SERPL-MCNC: 0.5 MG/DL (ref 0–1.2)
BUN BLDV-MCNC: 29 MG/DL (ref 6–23)
BUN BLDV-MCNC: 29 MG/DL (ref 6–23)
CALCIUM IONIZED: 1.3 MMOL/L (ref 1.15–1.33)
CALCIUM SERPL-MCNC: 9.4 MG/DL (ref 8.6–10.2)
CALCIUM SERPL-MCNC: 9.9 MG/DL (ref 8.6–10.2)
CHLORIDE BLD-SCNC: 93 MMOL/L (ref 98–107)
CHLORIDE BLD-SCNC: 98 MMOL/L (ref 98–107)
CO2: 32 MMOL/L (ref 22–29)
CO2: 34 MMOL/L (ref 22–29)
CREAT SERPL-MCNC: 0.7 MG/DL (ref 0.5–1)
CREAT SERPL-MCNC: 0.9 MG/DL (ref 0.5–1)
EOSINOPHILS ABSOLUTE: 0.15 E9/L (ref 0.05–0.5)
EOSINOPHILS ABSOLUTE: 0.33 E9/L (ref 0.05–0.5)
EOSINOPHILS RELATIVE PERCENT: 1 % (ref 0–6)
EOSINOPHILS RELATIVE PERCENT: 3.2 % (ref 0–6)
GFR AFRICAN AMERICAN: >60
GFR AFRICAN AMERICAN: >60
GFR NON-AFRICAN AMERICAN: 60 ML/MIN/1.73
GFR NON-AFRICAN AMERICAN: >60 ML/MIN/1.73
GLUCOSE BLD-MCNC: 117 MG/DL (ref 74–99)
GLUCOSE BLD-MCNC: 185 MG/DL (ref 74–99)
HCT VFR BLD CALC: 30.7 % (ref 34–48)
HCT VFR BLD CALC: 34.7 % (ref 34–48)
HEMOGLOBIN: 10.5 G/DL (ref 11.5–15.5)
HEMOGLOBIN: 9.2 G/DL (ref 11.5–15.5)
IMMATURE GRANULOCYTES #: 0.44 E9/L
IMMATURE GRANULOCYTES #: 0.48 E9/L
IMMATURE GRANULOCYTES %: 3.2 % (ref 0–5)
IMMATURE GRANULOCYTES %: 4.3 % (ref 0–5)
INR BLD: 1.6
LACTIC ACID, SEPSIS: 1.9 MMOL/L (ref 0.5–1.9)
LIPASE: 45 U/L (ref 13–60)
LYMPHOCYTES ABSOLUTE: 1.99 E9/L (ref 1.5–4)
LYMPHOCYTES ABSOLUTE: 1.99 E9/L (ref 1.5–4)
LYMPHOCYTES RELATIVE PERCENT: 13.2 % (ref 20–42)
LYMPHOCYTES RELATIVE PERCENT: 19.5 % (ref 20–42)
Lab: NORMAL
MAGNESIUM: 2 MG/DL (ref 1.6–2.6)
MCH RBC QN AUTO: 24.4 PG (ref 26–35)
MCH RBC QN AUTO: 24.7 PG (ref 26–35)
MCHC RBC AUTO-ENTMCNC: 30 % (ref 32–34.5)
MCHC RBC AUTO-ENTMCNC: 30.3 % (ref 32–34.5)
MCV RBC AUTO: 80.7 FL (ref 80–99.9)
MCV RBC AUTO: 82.3 FL (ref 80–99.9)
METER GLUCOSE: 129 MG/DL (ref 74–99)
METER GLUCOSE: 174 MG/DL (ref 74–99)
MONOCYTES ABSOLUTE: 0.81 E9/L (ref 0.1–0.95)
MONOCYTES ABSOLUTE: 0.91 E9/L (ref 0.1–0.95)
MONOCYTES RELATIVE PERCENT: 6.1 % (ref 2–12)
MONOCYTES RELATIVE PERCENT: 7.9 % (ref 2–12)
NEUTROPHILS ABSOLUTE: 11.44 E9/L (ref 1.8–7.3)
NEUTROPHILS ABSOLUTE: 6.59 E9/L (ref 1.8–7.3)
NEUTROPHILS RELATIVE PERCENT: 64.4 % (ref 43–80)
NEUTROPHILS RELATIVE PERCENT: 76.1 % (ref 43–80)
PDW BLD-RTO: 15 FL (ref 11.5–15)
PDW BLD-RTO: 15.1 FL (ref 11.5–15)
PHOSPHORUS: 4.1 MG/DL (ref 2.5–4.5)
PLATELET # BLD: 425 E9/L (ref 130–450)
PLATELET # BLD: 498 E9/L (ref 130–450)
PMV BLD AUTO: 8.9 FL (ref 7–12)
PMV BLD AUTO: 9.1 FL (ref 7–12)
POTASSIUM REFLEX MAGNESIUM: 3.7 MMOL/L (ref 3.5–5)
POTASSIUM SERPL-SCNC: 4.2 MMOL/L (ref 3.5–5)
PRO-BNP: 207 PG/ML (ref 0–450)
PROTHROMBIN TIME: 17.4 SEC (ref 9.3–12.4)
RBC # BLD: 3.73 E12/L (ref 3.5–5.5)
RBC # BLD: 4.3 E12/L (ref 3.5–5.5)
REPORT: NORMAL
SARS-COV-2, NAAT: NOT DETECTED
SODIUM BLD-SCNC: 139 MMOL/L (ref 132–146)
SODIUM BLD-SCNC: 140 MMOL/L (ref 132–146)
THIS TEST SENT TO: NORMAL
TOTAL PROTEIN: 5.6 G/DL (ref 6.4–8.3)
TOTAL PROTEIN: 7 G/DL (ref 6.4–8.3)
TROPONIN, HIGH SENSITIVITY: 17 NG/L (ref 0–9)
WBC # BLD: 10.2 E9/L (ref 4.5–11.5)
WBC # BLD: 15 E9/L (ref 4.5–11.5)

## 2022-02-28 PROCEDURE — 6370000000 HC RX 637 (ALT 250 FOR IP): Performed by: NURSE PRACTITIONER

## 2022-02-28 PROCEDURE — 96375 TX/PRO/DX INJ NEW DRUG ADDON: CPT

## 2022-02-28 PROCEDURE — 6370000000 HC RX 637 (ALT 250 FOR IP): Performed by: INTERNAL MEDICINE

## 2022-02-28 PROCEDURE — 87635 SARS-COV-2 COVID-19 AMP PRB: CPT

## 2022-02-28 PROCEDURE — 85025 COMPLETE CBC W/AUTO DIFF WBC: CPT

## 2022-02-28 PROCEDURE — 85610 PROTHROMBIN TIME: CPT

## 2022-02-28 PROCEDURE — 80053 COMPREHEN METABOLIC PANEL: CPT

## 2022-02-28 PROCEDURE — 2580000003 HC RX 258: Performed by: NURSE PRACTITIONER

## 2022-02-28 PROCEDURE — 97530 THERAPEUTIC ACTIVITIES: CPT

## 2022-02-28 PROCEDURE — 85730 THROMBOPLASTIN TIME PARTIAL: CPT

## 2022-02-28 PROCEDURE — 36415 COLL VENOUS BLD VENIPUNCTURE: CPT

## 2022-02-28 PROCEDURE — 84100 ASSAY OF PHOSPHORUS: CPT

## 2022-02-28 PROCEDURE — 83605 ASSAY OF LACTIC ACID: CPT

## 2022-02-28 PROCEDURE — 2700000000 HC OXYGEN THERAPY PER DAY

## 2022-02-28 PROCEDURE — 97530 THERAPEUTIC ACTIVITIES: CPT | Performed by: PHYSICAL THERAPIST

## 2022-02-28 PROCEDURE — 96374 THER/PROPH/DIAG INJ IV PUSH: CPT

## 2022-02-28 PROCEDURE — 6360000002 HC RX W HCPCS: Performed by: STUDENT IN AN ORGANIZED HEALTH CARE EDUCATION/TRAINING PROGRAM

## 2022-02-28 PROCEDURE — 99233 SBSQ HOSP IP/OBS HIGH 50: CPT | Performed by: INTERNAL MEDICINE

## 2022-02-28 PROCEDURE — 97535 SELF CARE MNGMENT TRAINING: CPT

## 2022-02-28 PROCEDURE — 82962 GLUCOSE BLOOD TEST: CPT

## 2022-02-28 PROCEDURE — 82330 ASSAY OF CALCIUM: CPT

## 2022-02-28 PROCEDURE — 84484 ASSAY OF TROPONIN QUANT: CPT

## 2022-02-28 PROCEDURE — 94660 CPAP INITIATION&MGMT: CPT

## 2022-02-28 PROCEDURE — 83735 ASSAY OF MAGNESIUM: CPT

## 2022-02-28 PROCEDURE — 96376 TX/PRO/DX INJ SAME DRUG ADON: CPT

## 2022-02-28 PROCEDURE — 83690 ASSAY OF LIPASE: CPT

## 2022-02-28 PROCEDURE — 99285 EMERGENCY DEPT VISIT HI MDM: CPT

## 2022-02-28 PROCEDURE — 93005 ELECTROCARDIOGRAM TRACING: CPT | Performed by: STUDENT IN AN ORGANIZED HEALTH CARE EDUCATION/TRAINING PROGRAM

## 2022-02-28 PROCEDURE — 83880 ASSAY OF NATRIURETIC PEPTIDE: CPT

## 2022-02-28 RX ORDER — POLYETHYLENE GLYCOL 3350 17 G/17G
17 POWDER, FOR SOLUTION ORAL DAILY
Qty: 527 G | Refills: 1 | DISCHARGE
Start: 2022-03-01 | End: 2022-03-31

## 2022-02-28 RX ORDER — FENTANYL CITRATE 50 UG/ML
50 INJECTION, SOLUTION INTRAMUSCULAR; INTRAVENOUS ONCE
Status: COMPLETED | OUTPATIENT
Start: 2022-02-28 | End: 2022-02-28

## 2022-02-28 RX ORDER — GABAPENTIN 300 MG/1
300 CAPSULE ORAL 3 TIMES DAILY
Qty: 90 CAPSULE | Refills: 3 | DISCHARGE
Start: 2022-02-28 | End: 2022-06-17

## 2022-02-28 RX ORDER — ONDANSETRON 2 MG/ML
4 INJECTION INTRAMUSCULAR; INTRAVENOUS ONCE
Status: COMPLETED | OUTPATIENT
Start: 2022-02-28 | End: 2022-02-28

## 2022-02-28 RX ORDER — DULOXETIN HYDROCHLORIDE 20 MG/1
20 CAPSULE, DELAYED RELEASE ORAL EVERY OTHER DAY
Qty: 30 CAPSULE | Refills: 3 | DISCHARGE
Start: 2022-03-02

## 2022-02-28 RX ADMIN — DULOXETINE HYDROCHLORIDE 20 MG: 20 CAPSULE, DELAYED RELEASE ORAL at 08:45

## 2022-02-28 RX ADMIN — Medication 10 ML: at 08:50

## 2022-02-28 RX ADMIN — GABAPENTIN 300 MG: 300 CAPSULE ORAL at 15:59

## 2022-02-28 RX ADMIN — LOSARTAN POTASSIUM 100 MG: 50 TABLET, FILM COATED ORAL at 08:45

## 2022-02-28 RX ADMIN — APIXABAN 10 MG: 5 TABLET, FILM COATED ORAL at 10:00

## 2022-02-28 RX ADMIN — FUROSEMIDE 20 MG: 20 TABLET ORAL at 12:00

## 2022-02-28 RX ADMIN — HYDROCHLOROTHIAZIDE 25 MG: 25 TABLET ORAL at 08:45

## 2022-02-28 RX ADMIN — ATORVASTATIN CALCIUM 10 MG: 10 TABLET, FILM COATED ORAL at 08:45

## 2022-02-28 RX ADMIN — SENNOSIDES AND DOCUSATE SODIUM 2 TABLET: 50; 8.6 TABLET ORAL at 08:46

## 2022-02-28 RX ADMIN — INSULIN LISPRO 1 UNITS: 100 INJECTION, SOLUTION INTRAVENOUS; SUBCUTANEOUS at 12:00

## 2022-02-28 RX ADMIN — GABAPENTIN 300 MG: 300 CAPSULE ORAL at 08:45

## 2022-02-28 RX ADMIN — ONDANSETRON 4 MG: 2 INJECTION INTRAMUSCULAR; INTRAVENOUS at 23:12

## 2022-02-28 RX ADMIN — ACETAMINOPHEN 650 MG: 325 TABLET ORAL at 11:09

## 2022-02-28 RX ADMIN — FENTANYL CITRATE 50 MCG: 50 INJECTION INTRAMUSCULAR; INTRAVENOUS at 23:11

## 2022-02-28 ASSESSMENT — ENCOUNTER SYMPTOMS
VOMITING: 1
BACK PAIN: 1
ABDOMINAL PAIN: 0
SHORTNESS OF BREATH: 0
NAUSEA: 1
DIARRHEA: 0
COUGH: 0
SORE THROAT: 0

## 2022-02-28 ASSESSMENT — PAIN SCALES - GENERAL
PAINLEVEL_OUTOF10: 7
PAINLEVEL_OUTOF10: 10
PAINLEVEL_OUTOF10: 0
PAINLEVEL_OUTOF10: 4

## 2022-02-28 ASSESSMENT — PAIN DESCRIPTION - PAIN TYPE: TYPE: ACUTE PAIN

## 2022-02-28 ASSESSMENT — PAIN DESCRIPTION - LOCATION: LOCATION: BACK

## 2022-02-28 NOTE — PROGRESS NOTES
Date: 2/27/2022    Time: 9:55PM  Patient Placed On BIPAP/CPAP/ Non-Invasive Ventilation? Yes    If no must comment. Facial area red/color change? No           If YES are Blister/Lesion present? No   If yes must notify nursing staff  BIPAP/CPAP skin barrier?   Yes    Skin barrier type:mepilexlite       Comments:        Anirudh Tiwari RCP

## 2022-02-28 NOTE — PROGRESS NOTES
Subjective:  2/21/2022  78year-old woman  Seen in cardiovascular ICU earlier this morning  Registered nurse at bedside  Orally intubated  Orogastric tube draining bloody material  Oxygenating well  Awaiting to be extubated  Data reviewed in detail  2/22/2022  Patient was seen in the cardiovascular ICU earlier this morning  Extubated and off ventilator  Not requiring any supplemental oxygen  Daughter at bedside  Data reviewed in detail  Patient is fully alert  2/23/2022  Patient was seen in cardiovascular ICU earlier this morning  Currently n.p.o. Tolerating medications  Registered nurse at bedside  No flatus yet  Wants to eat  2/24/2022  Patient was seen on the cardiovascular ICU earlier this morning  Oral intake better  Pain control adequate  Data reviewed in detail  Tolerating medications  2/25/2022  Patient was seen in cardiovascular ICU earlier this morning  Oral intake adequate  Pain control adequate  Tolerating medications  Data reviewed in detail  2/26/2022  Patient was seen on the floor earlier this morning  On supplemental oxygen of 4 L through nasal cannula  Pain control adequate  Oral intake adequate  Tolerating medications  Data reviewed in detail  2/27/2022  Patient was seen on the floor earlier this morning  Remains a stable  On supplemental oxygen 4 L nasal cannula  Tolerating medications  Pain control adequate  Oral intake adequate  2/28/2022  Patient was seen on the floor earlier this morning  Lying flat in bed without orthopnea  On 4 L of supplemental oxygen through nasal cannula  Saturating about 97%  Registered nurse at bedside  Data reviewed in detail  Objective:    BP (!) 101/56   Pulse 86   Temp 97.7 °F (36.5 °C) (Temporal)   Resp 18   Ht 5' 1\" (1.549 m)   Wt 196 lb 9.6 oz (89.2 kg)   SpO2 98%   BMI 37.15 kg/m²   Fully alert and oriented  Pupils were equal  Orogastric tube out  IJ line is out  Trachea midline  No adenopathy  Heart:  RRR, no murmurs, gallops, or rubs.   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   Overall medically stable  Patient Active Problem List   Diagnosis    Essential hypertension, benign    Lymphedema    Osteoarthritis    Hyperlipidemia    Anxiety    Acute mesenteric arterial occlusion (HCC)    Acute saddle pulmonary embolism with acute cor pulmonale (HCC)    Occlusion of left subclavian artery    Limb ischemia    Multiple pulmonary emboli (HCC)       Plan:  Medically stable for discharge to subacute rehab  Currently on Eliquis loading dose 10 mg twice daily  Questions answered to her satisfaction  Advance diet and activity  Monitor labs  Long-term anticoagulant therapy will be necessary        Karlee Calixto MD  9:48 AM  2/28/2022

## 2022-02-28 NOTE — PROGRESS NOTES
Micky Talley Eliaao 1940    SUBJECTIVE:  Sitting comfortable. No bleeding on Eliquis    OBJECTIVE:  VITALS:  BP (!) 110/53   Pulse 80   Temp 97.4 °F (36.3 °C) (Temporal)   Resp 20   Ht 5' 1\" (1.549 m)   Wt 196 lb 9.6 oz (89.2 kg)   SpO2 94%   BMI 37.15 kg/m²    GENERAL: Fatigued. Alert and oriented   ENT:  oropharynx clear, no evidence of mucositis. NECK:  No  lymphadenopathy. LUNGS:  Clear to auscultation, no wheeze or rhonchi   CARDIOVASCULAR:  Regular rate and rhythm. ABDOMEN:  Soft, tender, nondistended    NEUROLOGIC:  No focal deficits. EXTREMITIES: without clubbing, cyanosis, or edema. DIAGNOSTIC DATA  Lab Results   Component Value Date    WBC 10.2 02/28/2022    HGB 9.2 (L) 02/28/2022    HCT 30.7 (L) 02/28/2022    MCV 82.3 02/28/2022     02/28/2022     IMPRESSION:  Patient Active Problem List   Diagnosis    Essential hypertension, benign    Lymphedema    Osteoarthritis    Hyperlipidemia    Anxiety    Acute mesenteric arterial occlusion (HCC)    Acute saddle pulmonary embolism with acute cor pulmonale (HCC)    Occlusion of left subclavian artery    Limb ischemia    Multiple pulmonary emboli (HCC)     Impression/Plan:  78 y.o. female with Hx of HTN, DMII, lymphedema, multiple prior DVT who was treated with short term anticoagulation (Coumadin), was not currently taking, who presented with bilateral massive PE, left subclavian artery thrombus, and SMA thrombus. She was started on a heparin drip and then taken to OR by [facility] for SMA embolectomy, IVC filter, and left arm embolectomy.      Hypercoag work-up in process  -D-Dimer 4531  -Homocysteine 4.3 (0-15)  -KHRIS      Negative  -DRVVT Negative  -Cardiolipin antibodies: IgG <10 (<14), IgM: 11 (<12) IgA: <10 (<11)  Beta-2 Glyco 1 IgA <=20 JAVIER 19    -AT-III Activity 64% - may be low in acute setting.  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 No bleeding noted. Continue to monitor CBC : Hb 9.2 on 02/28/2022; Transfuse if Hb <7.0  Dr. Eden Eugene consulted to evaluate the liver lesion on CTA. CTA triphasic liver consistent with hepatic cysts.  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

## 2022-02-28 NOTE — LETTER
[place] Department Medicaid  CERTIFICATION OF NECESSITY  FOR NON-EMERGENCY TRANSPORTATION   BY GROUND AMBULANCE      Individual Information   1. Name: Anoop Farley 2. [place] Medicaid Billing Number:    3. Address: [address] [place] [address]      Transportation Provider Information   4. Provider Name:    5. [place] Medicaid Provider Number:  National Provider Identifier (NPI):      Certification  7. Criteria:  During transport, this individual requires:  [x] Medical treatment or continuous     supervision by an EMT. [x] The administration or regulation of oxygen by another person. [] Supervised protective restraint. 8. Period Beginning Date: 3/4/22   9. Length  [x] Not more than 5 day(s)  [] One Year     Additional Information Relevant to Certification   10. Comments or Explanations, If Necessary or Appropriate      Abdominal Surgery, 02-2L, Fall Risk Generalized Weakness-enhibits decreased strength, balance, coordination impairing functional ability. Certifying Practitioner Information   11. Name of Practitioner: Cira Smith MD   12. [place] Medicaid Provider Number, If Applicable:  [address] Provider Identifier (NPI):      Signature Information   14. Date of Signature: 3/4/22 15. Name of Person Signing: Electronically signed by Adi Garcia on 3/4/2022 at 11:10 AM   16. Signature and Professional Designation: Electronically signed by Adi Garcia, Discharge Planner on 3/4/2022 at 11:10 AM     OD 78096  Rev. 7/2015    [address] [place] [address] Encounter Date/Time: 2/28/2022 2234    Hospital Account: [de-identified]    MRN: [id number]    Patient: Anoop Farley    Contact Serial #: [id number]      ENCOUNTER          Patient Class: I Private Enc?   No Unit RM BD: SEYZ 5S NS 5216/5216-A   Hospital Service: MED   Encounter DX: Back pain [M54.9]   ADM Provider: Cira Smith MD   Procedure:     ATT Provider: Cira Smith MD   REF Provider:        Admission DX: Back pain, Urinary tract infection with hematuria, site unspecified, Other pulmonary embolism without acute cor pulmonale, unspecified chronicity ([facility] Acute back pain, unspecified back location, unspecified back pain laterality and DX codes: M54.9, N39.0, R31.9, I26.99, M54.9      PATIENT                 Name: Yue Gordon : 1940 (81 yrs)   Address: [address] [place] [address] Sex: Female   [place]: [address] [place] [address]         Marital Status:    Employer: RETIRED         Mu-ism: Amish   Primary Care Provider: Galilea Urbano MD         Primary Phone: [phone]   EMERGENCY CONTACT   Contact Name Legal Guardian? Relationship to Patient Home Phone Work Phone   1. Vianey Garcia  2. Paulo Perez No    Child  Child [phone]              GUARANTOR            Guarantor: Yue Gordon     : 1940   Address: [address] [place] [address] Sex: Female     [address]      Relation to Patient: Self       Home Phone: [phone]   Guarantor ID: [id number]       Work Phone:     Guarantor Employer: RETIRED         Status: RETIRED      COVERAGE        PRIMARY INSURANCE   Payor: [facility] MEDICARE Plan: JUVENAL MEDIMAGALIEUE LORIEENTIA*   Payor Address: [facility] [id number] [place] [address]       Group Number: [id number] Type: INDEMNITY   Subscriber Name: Fannie Montemayor Subscriber : 1940   Subscriber ID: [id number] [place]. Rel. to Sub: Self   SECONDARY INSURANCE   Payor:   Plan:     Payor Address:  ,           Group Number:   Insurance Type:     Subscriber Name:   Subscriber :     Subscriber ID:   Pat.  Rel. to Sub:             CSN: [id number]

## 2022-02-28 NOTE — PLAN OF CARE
Problem: Skin Integrity:  Goal: Will show no infection signs and symptoms  Description: Will show no infection signs and symptoms  Outcome: Met This Shift  Goal: Absence of new skin breakdown  Description: Absence of new skin breakdown  Outcome: Met This Shift     Problem: Falls - Risk of:  Goal: Will remain free from falls  Description: Will remain free from falls  Outcome: Met This Shift  Goal: Absence of physical injury  Description: Absence of physical injury  Outcome: Met This Shift     Problem: Pain:  Goal: Pain level will decrease  Description: Pain level will decrease  Outcome: Met This Shift  Goal: Control of acute pain  Description: Control of acute pain  Outcome: Met This Shift  Goal: Control of chronic pain  Description: Control of chronic pain  Outcome: Met This Shift     Problem: Musculor/Skeletal Functional Status  Goal: Highest potential functional level  Outcome: Met This Shift  Goal: Absence of falls  Outcome: Met This Shift     Problem: Health Behavior:  Goal: Ability to identify and alter actions that are detrimental to health will improve  Description: Ability to identify and alter actions that are detrimental to health will improve  Outcome: Met This Shift     Problem: Tissue Perfusion:  Goal: Peripheral tissue perfusion will improve  Description: Peripheral tissue perfusion will improve  Outcome: Met This Shift     Problem: Fluid Volume:  Goal: Risk for vascular injury will decrease  Description: Risk for vascular injury will decrease  Outcome: Met This Shift     Problem: Physical Regulation:  Goal: Complications related to the disease process, condition or treatment will be avoided or minimized  Description: Complications related to the disease process, condition or treatment will be avoided or minimized  Outcome: Met This Shift     Problem: Safety:  Goal: Ability to remain free from injury will improve  Description: Ability to remain free from injury will improve  Outcome: Met This Shift

## 2022-02-28 NOTE — CARE COORDINATION
SOCIAL WORK/CASEMANAGEMENT TRANSITION OF CARE [id number] [place]  [facility] [address], [place], [phone]): accepted to [facility] today, snf;loc. [facility] ambulette  at 5:30 p.m. rn just sent for covid. Discharge paper work in chart with lavern completed. Daughter, Miladis Villarreal, is present and in agreement. BERNARDO Cobos  2/28/2022  PASSAR COMPLETED.  BERNARDO Cobos  ,2/28/2022

## 2022-02-28 NOTE — DISCHARGE INSTR - COC
Continuity of Care Form    Patient Name: America Delcid   :  1940  MRN:  [id number]    Admit date:  2022  Discharge date:  ***    Code Status Order: Full Code   Advance Directives:      Admitting Physician:  Mamadou Dejesus MD  PCP: Erendira Mak MD    Discharging Nurse: [facility] Unit/Room#: 2902/5568-S  Discharging Unit Phone Number: ***    Emergency Contact:   Extended Emergency Contact Information  Primary Emergency Contact: Shahid Chan  [place] Phone: [phone]  Relation: Child  Preferred language: English   needed?  No  Secondary Emergency Contact: Paulo Perez   [address] Phone: [phone]  Relation: Child    Past Surgical History:  Past Surgical History:   Procedure Laterality Date    ABDOMINAL AORTIC ANEURYSM REPAIR N/A 2022    EXPLORATORY LAPAROTOMY, MESENTERIC ARTERY EMBOLECTOMY performed by Keila Hogue MD at [id number] [place] [address] Left 2022    LEFT ARM  EMBOLECTOMY performed by Keila Hogue MD at [facility] Right 2022    VENA CAVA FILTER INSERTION performed by Keila Hogue MD at [address] [place]       Immunization History:   Immunization History   Administered Date(s) Administered    Influenza Vaccine, unspecified formulation 10/26/2016    Influenza Virus Vaccine 10/10/2012    Influenza, High Dose (Fluzone 65 yrs and older) 10/30/2015, 10/26/2016, 2017, 10/31/2018, 2019    Pneumococcal Conjugate 13-valent ([place]) 2005, 10/30/2015    Pneumococcal Polysaccharide ([address]) 2020    Zoster Live (Zostavax) 2013       Active Problems:  Patient Active Problem List   Diagnosis Code    Essential hypertension, benign I10    Lymphedema I89.0    Osteoarthritis M19.90    Hyperlipidemia E78.5    Anxiety F41.9    Acute mesenteric arterial occlusion ([address] K55.069    Acute saddle pulmonary embolism with acute cor pulmonale ([facility] [address] [facility] I26.02 JSVJ:244479415}    Treatments at the Time of Hospital Discharge:   Respiratory Treatments: ***  Oxygen Therapy:  {Therapy; copd oxygen:43049}  Ventilator:    {MH CC Vent PVBI:818805908}    Rehab Therapies:  PT and OT to eval and treat   Weight Bearing Status/Restrictions: 50Shantanu LEMON Weight Bearin}  Other Medical Equipment (for information only, NOT a DME order):  {EQUIPMENT:708233917}  Other Treatments: ***    Patient's personal belongings (please select all that are sent with patient):  {P DME Belongings:805004246}    RN SIGNATURE:  {Esignature:675096797}    CASE MANAGEMENT/SOCIAL WORK SECTION    Inpatient Status Date: 22    Readmission Risk Assessment Score:  Readmission Risk              Risk of Unplanned Readmission:  18           Discharging to Facility/ Agency   Name: [facility]  Address:  Phone:  Fax:    Dialysis Facility (if applicable)   Name:  Address:  Dialysis Schedule:  Phone:  Fax:    / signature: Electronically signed by Giovani Stevenson RN on 2022 at 11:24 AM     PHYSICIAN SECTION    Prognosis: {Prognosis:9558163729}    Condition at Discharge: 50Shantanu Stone Patient Condition:588391945}    Rehab Potential (if transferring to Rehab): {Prognosis:5003831229}    Recommended Labs or Other Treatments After Discharge: ***    Physician Certification: I certify the above information and transfer of Litzy Hopkins  is necessary for the continuing treatment of the diagnosis listed and that she requires [place] for less 30 days.      Update Admission H&P: {CHP DME Changes in FKWRF:742405549}    PHYSICIAN SIGNATURE:  Litzy Guillen MD

## 2022-02-28 NOTE — PATIENT CARE CONFERENCE
P Quality Flow/Interdisciplinary Rounds Progress Note        Quality Flow Rounds held on February 28, 2022    Disciplines Attending:  Bedside Nurse, ,  and Nursing Unit Leadership    Dodie Wagner was admitted on 2/19/2022 12:29 PM    Anticipated Discharge Date:  Expected Discharge Date: 02/28/22    Disposition:    Luis Score:  Luis Scale Score: 19    Readmission Risk              Risk of Unplanned Readmission:  18           Discussed patient goal for the day, patient clinical progression, and barriers to discharge.   The following Goal(s) of the Day/Commitment(s) have been identified:  Diagnostics - Report Results      Natalya Hanson RN  February 28, 2022

## 2022-02-28 NOTE — PROGRESS NOTES
note  BLE:  WFL       Strength BUE:  Defer to OT note  BLE:  4/5 grossly    Increase by 1/3 MMT grade   Balance Sitting EOB:  ModA dynamically   Dynamic Standing:  ModA with [employer] Sitting EOB:  SBA    Dynamic Standing:  Guillermina with [employer] Sitting EOB:  SBA  Dynamic Standing:  SBA with [employer]      Pt is A & O x 3  Sensation:  Pt denies numbness and tingling to extremities  Edema:  Mild edema of B lower legs    Vitals:  Resting 98% on 3L/min via NC  Maintained SPO2 97% on 3L/min with amb  Titrated down to 2L/min with SPO2 maintained at 95% post amb    Patient education  Pt educated on role of therapy, safety with mobility, transfer technique, improved gait mechanics and walker approximation    Patient response to education:   Pt verbalized understanding Pt demonstrated skill Pt requires further education in this area   yes yes yes     ASSESSMENT:    Comments:  Pt seated on BSC upon therapist arrival, agreeable to PT session. Pt educated in safe hand placement with transfers with fair carryover. Pt amb with forward flexed posture and downward gaze throughout requiring light balance assist and cues for walker approximation. Pt completed 2 bouts of amb with seated rest break between distances and standing rest break between 40' distance. Pt seated in recliner upon completion of session with family at bedside and all needs in reach. Treatment:  Patient practiced and was instructed in the following treatment:     Transfer training: cues for hand placement and safety, manual assist for lift/lower   Gait training: cues for upright posture and walker approximation, education for improved mechanics, manual assist for balance, skilled monitoring of SPO2 response to activity    PLAN:    Patient is making good progress towards established goals. Will continue with current POC.         PLAN:    Time in  0955  Time out  1025    Total Treatment Time  30 min    CPT codes:  [] Gait training 06029 0 minutes  [] Manual therapy 20874 0 minutes  [x] Therapeutic activities 65435 30 minutes  [] Therapeutic exercises 89570 0 minutes  [] Neuromuscular reeducation 58770 0 minutes      Dwight Browne, PT, DPT  [id number]

## 2022-02-28 NOTE — CARE COORDINATION
Therapy dept called to see pt.     1115  Met with pt and pt's daughter, Jovana Crow, in room. They placed pt's son, Nuzhat Sapp, on speaker phone. Informed pt will need jovan. Await therapy treatment notes. jovan choices are #1 [facility] - referral was made to [facility]. #2 [facility] #3 Eliel Danielson.

## 2022-02-28 NOTE — PROGRESS NOTES
Vascular Surgery Progress Note    CC:  left subclavian artery occlusion, submassive pulmonary emboli and SMA embolus    Subjective: Denies any pain or changes in motor or sensation. States she wants to go home. IMPRESSION:   s/p VCF insertion, Ex Lap, SMA embolectomy, L UE embolectomy 2/19    PLAN: Vascular exam stable. Continue Ac with eliquis. 04[address] Jenn Chapman for discharge from vascular standpoint. Will arrange fu.     Patient Active Problem List   Diagnosis Code    Essential hypertension, benign I10    Lymphedema I89.0    Osteoarthritis M19.90    Hyperlipidemia E78.5    Anxiety F41.9    Acute mesenteric arterial occlusion ([address] K55.069    Acute saddle pulmonary embolism with acute cor pulmonale (HCC) I26.02    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    Limb ischemia I99.8    Multiple pulmonary emboli (HCC) I26.99       Current Medications:    dextrose      sodium chloride        oxyCODONE **OR** oxyCODONE, glucose, dextrose, glucagon (rDNA), dextrose, sodium chloride flush, sodium chloride    DULoxetine  20 mg Oral Every Other Day    atorvastatin  10 mg Oral Daily    apixaban  10 mg Oral BID    Followed by   Jon Otoole ON 3/3/2022] apixaban  5 mg Oral BID    gabapentin  300 mg Oral TID    furosemide  20 mg Oral Daily    losartan  100 mg Oral Daily    And    hydroCHLOROthiazide  25 mg Oral Daily    acetaminophen  650 mg Oral Q4H While awake    polyethylene glycol  17 g Oral Daily    sennosides-docusate sodium  2 tablet Oral BID    insulin lispro  0-12 Units SubCUTAneous TID WC    insulin lispro  0-6 Units SubCUTAneous Nightly    sodium chloride flush  5-40 mL IntraVENous 2 times per day          PHYSICAL EXAM:    Vitals:    02/28/22 0830   BP: (!) 101/56   Pulse: 86   Resp: 18   Temp: 97.7 °F (36.5 °C)   SpO2:      CONSTITUTIONAL:  awake, alert, cooperative, no apparent distress  LUNGS:  Easy, nonlabored on 5L NC  CARDIOVASCULAR: regular rate and rhythm  ABDOMEN:  soft, non-distended and tender to palpation along midline incision. incision C/D/I  R UE: 2+ radial, 5/5 hand  strength  L UE: upper arm incision well approximated, 2+ radial/ulnar. 5/5 hand  strength, sensation intact  R LE: foot warm, well perfused. +motor/sensation  L LE: foot warm, well perfused. 2+ DP, foot warm and well perfused.  + motor/sensation    LABS:    Lab Results   Component Value Date    WBC 10.2 02/28/2022    HGB 9.2 (L) 02/28/2022    HCT 30.7 (L) 02/28/2022     02/28/2022    PROTIME 12.1 02/19/2022    INR 1.1 02/19/2022    APTT 33.6 02/25/2022    K 4.2 02/28/2022    BUN 29 (H) 02/28/2022    CREATININE 0.7 02/28/2022       RADIOLOGY:

## 2022-02-28 NOTE — PROGRESS NOTES
Vascular Surgery Progress Note    CC:  left subclavian artery occlusion, submassive pulmonary emboli and SMA embolus    Subjective: No abdominal pain. Having bowel movements and tolerating her diet.   No lower extremity pain or left upper extremity pain    IMPRESSION:   s/p VCF insertion, Ex Lap, SMA embolectomy, L UE embolectomy 2/19    PLAN: Continue to monitor bilateral lower extremity and left upper extremity neurovascular exam  Continue to monitor abdominal exam  PT/OT    Patient Active Problem List   Diagnosis Code    Essential hypertension, benign I10    Lymphedema I89.0    Osteoarthritis M19.90    Hyperlipidemia E78.5    Anxiety F41.9    Acute mesenteric arterial occlusion ([facility] [address] K55.069    Acute saddle pulmonary embolism with acute cor pulmonale (HCC) I26.02    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    Limb ischemia I99.8    Multiple pulmonary emboli (HCC) I26.99       Current Medications:    dextrose      sodium chloride        oxyCODONE **OR** oxyCODONE, glucose, dextrose, glucagon (rDNA), dextrose, sodium chloride flush, sodium chloride    DULoxetine  20 mg Oral Every Other Day    atorvastatin  10 mg Oral Daily    apixaban  10 mg Oral BID    Followed by   Ronit Swartz ON 3/3/2022] apixaban  5 mg Oral BID    gabapentin  300 mg Oral TID    furosemide  20 mg Oral Daily    losartan  100 mg Oral Daily    And    hydroCHLOROthiazide  25 mg Oral Daily    acetaminophen  650 mg Oral Q4H While awake    polyethylene glycol  17 g Oral Daily    sennosides-docusate sodium  2 tablet Oral BID    insulin lispro  0-12 Units SubCUTAneous TID WC    insulin lispro  0-6 Units SubCUTAneous Nightly    sodium chloride flush  5-40 mL IntraVENous 2 times per day          PHYSICAL EXAM:    Vitals:    02/28/22 0400   BP: 133/64   Pulse: 80   Resp: 16   Temp: 98.1 °F (36.7 °C)   SpO2: 98%     CONSTITUTIONAL:  awake, alert, cooperative, no apparent distress  LUNGS:  Easy, nonlabored on 5L NC  CARDIOVASCULAR: regular rate and rhythm  ABDOMEN:  soft, non-distended and tender to palpation along midline incision. incision C/D/I  R UE: 2+ radial, 5/5 hand  strength  L UE: upper arm incision well approximated, 2+ radial/ulnar. 5/5 hand  strength, sensation intact  R LE: foot, warm, well perfused. Multiphasic DP/PT  L LE: foot warm, well perfused.   Multiphasic DP/PT    LABS:    Lab Results   Component Value Date    WBC 10.2 02/28/2022    HGB 9.2 (L) 02/28/2022    HCT 30.7 (L) 02/28/2022     02/28/2022    PROTIME 12.1 02/19/2022    INR 1.1 02/19/2022    APTT 33.6 02/25/2022    K 4.1 02/27/2022    BUN 24 (H) 02/27/2022    CREATININE 0.8 02/27/2022       RADIOLOGY:

## 2022-02-28 NOTE — LETTER
[address] Medicaid  CERTIFICATION OF NECESSITY  FOR TRANSPORTATION   BY WHEELCHAIR VAN     Individual Information   1. Name: Alta Randall 2. [place] Medicaid Billing Number:    3. Address: [address] [place] [address]      Transportation Provider Information   4. Provider Name:    5. [place] Medicaid Provider Number:  National Provider Identifier (NPI):      Certification  7. Criteria:  By signing this document, the practitioner certifies that two statements are true:  A. This individual must be accompanied by a mobility-related assistive device from the point of pick-up to the point of drop-off. B. Transport of this individual by standard passenger vehicle or common carrier is precluded or contraindicated. 8. Period Beginning Date:  03/02/22   9. Length  [x] Not more than 8 day(s)  [] One Year     Additional Information Relevant to Certification   10. Comments or Explanations, If Necessary or Appropriate     Pulmonary Embolism, UTI, Back Pain, IVC Filter     Certifying Practitioner Information   11. Name of Practitioner:  Dr Hong Moya MD   12. [place] Medicaid Provider Number, If Applicable:  [address] Provider Identifier (NPI):      Signature Information   14. Date of Signature:  03/02/2022 15. Name of Person Signing:  Lesia Gonzalez RN.   16. Signature and Professional Designation:   Electronically signed by Lesia Gonzalez RN on 3/2/22 at 11:35 AM EST       [facility] [address]  Rev. 7/2015    [facility] Encounter Date/Time: 2/28/2022 [address] Account: [de-identified]    MRN: [id number]    Patient: Alta Randall    Contact Serial #: [id number]      ENCOUNTER          Patient Class: I Private Enc?   No [facility] [id number]   Hospital Service: MED   Encounter DX: Back pain [M54.9]   ADM Provider: Hong Moya MD   Procedure:     ATT Provider: Hong Moya MD   REF Provider:        Admission DX: Back pain, Urinary tract infection with hematuria, site unspecified, Other pulmonary embolism without acute cor pulmonale, unspecified chronicity ([address] Acute back pain, unspecified back location, unspecified back pain laterality and DX codes: M54.9, N39.0, R31.9, I26.99, M54.9      PATIENT                 Name: Miguelangel Mendoza : 1940 (81 yrs)   Address: [address] [place] [address] Sex: Female   [place] city: [address] [place] [address]         Marital Status:    Employer: RETIRED         Buddhist: Church   Primary Care Provider: Piteer Noe MD         Primary Phone: [phone]   EMERGENCY CONTACT   Contact Name Legal Guardian? Relationship to Patient Home Phone Work Phone   1. Maheshbob Jamie  2. ChrisPaulo No    Child  Child [phone]              GUARANTOR            Guarantor: Miguelangel Mendoza     : 1940   Address: [address] [place] [address] Sex: Female     [address] [place] [address]     Relation to Patient: Self       Home Phone: [phone]   Guarantor ID: [id number]       Work Phone:     Guarantor Employer: RETIRED         Status: RETIRED      COVERAGE        PRIMARY INSURANCE   Payor: [facility] MEDICARE Plan[facility]   Payor Address: [facility] [id number] [place] [address]       Group Number: [address] Type: INDEMNITY   Subscriber Name: Zoe Juarez Subscriber : 1940   Subscriber ID: [id number] Cony Shall. Rel. to Sub: Self   SECONDARY INSURANCE   Payor:   Plan:     Payor Address:  ,           Group Number:   Insurance Type:     Subscriber Name:   Subscriber :     Subscriber ID:   Pat.  Rel. to Sub:             CSN: [id number]

## 2022-02-28 NOTE — PROGRESS NOTES
OCCUPATIONAL THERAPY TREATMENT NOTE    [address] [address] [facility]  [address]                                                               Patient Name: Hector Mancera  MRN: [id number]  : 1940  Room: [address] [place] [address]       Evaluating OT: Mau Craven OTR/L 4504     Referring Provider: Leonardo Menezes MD   Specific Provider Orders/Date: OT eval and treat (22)        Diagnosis:  Superior mesenteric artery thrombosis                Limb ischemia               Left axillary artery thrombus               Acute saddle pulmonary embolism with acute cor pulmonale              Ischemia of left upper extremity               Multiple pulmonary emboli      Reason for admission: stroke like symptoms; L UE numbness and weakness. 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 Functional mobility          [x]? ADLs           [x]? Strength                  []?Cognition   [x]? Functional transfers        [x]? IADLs         [x]? Safety Awareness   [x]? Endurance   []? Fine Coordination           [x]? ROM           []? Vision/perception    []? Sensation     []? Gross Motor Coordination [x]? Balance    []? Delirium                  []?Motor Control     []?  Communication     OT PLAN OF CARE   OT POC based on physician orders, patient diagnosis and results of clinical assessment.        Frequency/Duration: 1-3 days/wk for 1-2 weeks PRN    Specific OT Treatment to include:   ADL retraining/adapted techniques and AE recommendations to increase functional independence within precautions                    Energy conservation techniques to improve tolerance for selfcare routine   Functional transfer/mobility training/DME recommendations for increased independence, safety and fall prevention         Patient/family education to increase safety and functional independence within precautions              Environmental modifications for safe mobility and completion of ADLs                           Therapeutic activity to improve functional performance during ADLs/IADLs                                         Therapeutic exercise to improve tolerance and functional strength for ADLs   Balance retraining exercises/tasks for facilitation of postural control with dynamic challenges during ADLs . Positioning to improve functional independence     Recommended Adaptive Equipment: TBA: LB Dressing AE, tub transfer bench and rail, commode rails       Home Living: Pt lives alone  in a 2 floor plan with 3 step(s) to enter through garage and no rail(s); bed & half bath on first floor; walk in shower in basement: flight with rail. Tub/shower in 2nd floor: 9 steps/1HR. Bathroom setup: pt uses walk in shower in basement with seat; standard height commode seats  Equipment owned: shower seat, rollator     Prior Level of Function: IND with ADLs;  IND with IADLs. IND for ambulation. Pt has been using rollator for past week due to difficulty ambulating (LE and back pain.)  Driving: yes     Pain Level: Pt complained of minimal abdominal pain this session     Cognition: A&O: 4/4    Follows 1-2 step commands appropriately.               Memory: good             Comprehension good             Problem solving: fair+/good             Judgement/safety: fair+/good                Communication skills: WFL             Vision: WFL                    Glasses:s/p cataract surgery- no longer wears glasses                                                       Hearing: WFL               RASS: 0  CAM-ICU: (NT) Delirium     UE Assessment:  Hand Dominance: Right [x]? Left []?       ROM Strength   RUE  WFL 4/5   LUE WFL 4-/5      Sensation: No c/o numbness/tingling in  extremities. Tone: WNL   Edema: min edema L UE; min B LE's/moderate in feet     Functional Assessment:  AM-PAC Daily Activity Raw Score: 14/24    Initial Eval Status  Date: 2/22/22 Treatment Status  Date:2/27 STGs = LTGs  Time frame: 7-14 days   Feeding S; set up Setup                       Rita  while seated up in chair to increase activity tolerance         Grooming Min A  set up SBA  Pt washed face, applied deodorant, combed hair seated upright in chair                       SBA   while standing sink level requiring [employer] for balance and demonstrating G tolerance       UB dressing/bathing Max A modA-dressing  [facility] gown as robe seated upright in chair    Veronica-bathing  Simulated Task                       Min A         LB dressing/bathing Dep     Max A using AE after instruction; seated up in chair maxA-dressing  [facility] socks    maxA-bathing  Simulated Task                       Min A   using AE as needed for safe reach/ energy conservation        Toileting Dep maxA  Pt completed toielitng task on bedside commode, assistance to transfer, complete of hygiene and manage of clothes                       Min A      Bed Mobility  Supine to sit: Max A+2     Sit to supine:   NT DNT  maxAx2 per previous level    Pt seated upright in chair upon arrival and at end of session                       Min A  in prep of ADL tasks & transfers   Functional Transfers Sit to stand:    Mod A     Stand to sit:   Mod A modA  Sit to Stand  Stand to Sit    Cueing for hand placement                       Min A  sit<>stand/functional bathroom transfers using AD/DME as needed for balance and safety   Functional Mobility Mod A SPT to chair using [employer] Veronica  Pt ambulated short household distance in room and within ruiz with seated rest break and use of brittany, monitoring of O2                      Min A   functional/bathroom mobility using AD as needed & demonstrating G safety      Balance Sitting:     Static:  Min A    Dynamic:ModA/ posterior LOB  Standing: Mod A [employer] Sitting Supported:  Supervision    Standing:  Min/modA S dynamic sitting balance; Min A dynamic standing balance  during ADL tasks & transfers   Endurance/Activity Tolerance    F tolerance with light activity. Fair- G   tolerance with moderate activity/self care routine   Visual/  Perceptual    WFL                            Comments/Treatment/Education: Upon arrival, pt seated upright in chair, agreeable to therapy, speaking with nursing okaying pt to be seen this session. Pt completed of transfers, functional mobility, and light ADL tasks this session. Pt educated on role of OT, goals to be reached, importance of OOB activity, safety and hand placement with transfers, safety and walker management with functional mobility, compensatory strategies to assist with ADL tasks, energy conservation and deep breathing techniques. At end of session, pt seated upright in chair, all tubes and lines intact, call light within reach, daughter present. Pt being on 3L of O2 during session being within normal limits and rest and with mobility lowering pt to 2L, with pt still maintaining levels    · Pt has made good progress towards set goals.    · Continue with current plan of care focusing on increasing of independency with transfers and ADL tasks       Time In: 9:55am              Time Out: 10:25am        Total Treatment Time: 30mins       Treatment Charges: Mins Units   Ther Ex  02041     Manual Therapy 57287     Thera Activities 91598 22 1   ADL/Home Mgt 61125 8 1   Neuro Re-ed 15963     Orthotic manage/training  37917     Non-Billable Time         Abbie Salazar BHATT/L 22697

## 2022-03-01 ENCOUNTER — APPOINTMENT (OUTPATIENT)
Dept: CT IMAGING | Age: 82
DRG: 698 | End: 2022-03-01
Payer: MEDICARE

## 2022-03-01 PROBLEM — M54.9 BACK PAIN: Status: ACTIVE | Noted: 2022-03-01

## 2022-03-01 LAB
BACTERIA: ABNORMAL /HPF
BILIRUBIN URINE: NEGATIVE
BLOOD, URINE: ABNORMAL
CLARITY: CLEAR
COLOR: YELLOW
EKG ATRIAL RATE: 82 BPM
EKG P AXIS: 50 DEGREES
EKG P-R INTERVAL: 154 MS
EKG Q-T INTERVAL: 344 MS
EKG QRS DURATION: 80 MS
EKG QTC CALCULATION (BAZETT): 401 MS
EKG R AXIS: -7 DEGREES
EKG T AXIS: 79 DEGREES
EKG VENTRICULAR RATE: 82 BPM
GLUCOSE URINE: 100 MG/DL
KETONES, URINE: 15 MG/DL
LACTIC ACID, SEPSIS: 1.4 MMOL/L (ref 0.5–1.9)
LEUKOCYTE ESTERASE, URINE: ABNORMAL
METER GLUCOSE: 125 MG/DL (ref 74–99)
METER GLUCOSE: 131 MG/DL (ref 74–99)
METER GLUCOSE: 137 MG/DL (ref 74–99)
NITRITE, URINE: NEGATIVE
PH UA: 7 (ref 5–9)
PROTEIN UA: NEGATIVE MG/DL
RBC UA: ABNORMAL /HPF (ref 0–2)
SPECIFIC GRAVITY UA: 1.01 (ref 1–1.03)
TROPONIN, HIGH SENSITIVITY: 23 NG/L (ref 0–9)
TROPONIN, HIGH SENSITIVITY: 28 NG/L (ref 0–9)
UROBILINOGEN, URINE: 0.2 E.U./DL
WBC UA: ABNORMAL /HPF (ref 0–5)

## 2022-03-01 PROCEDURE — 2580000003 HC RX 258: Performed by: RADIOLOGY

## 2022-03-01 PROCEDURE — 2580000003 HC RX 258: Performed by: STUDENT IN AN ORGANIZED HEALTH CARE EDUCATION/TRAINING PROGRAM

## 2022-03-01 PROCEDURE — 2060000000 HC ICU INTERMEDIATE R&B

## 2022-03-01 PROCEDURE — 83605 ASSAY OF LACTIC ACID: CPT

## 2022-03-01 PROCEDURE — 36415 COLL VENOUS BLD VENIPUNCTURE: CPT

## 2022-03-01 PROCEDURE — 82962 GLUCOSE BLOOD TEST: CPT

## 2022-03-01 PROCEDURE — 74174 CTA ABD&PLVS W/CONTRAST: CPT

## 2022-03-01 PROCEDURE — 87077 CULTURE AEROBIC IDENTIFY: CPT

## 2022-03-01 PROCEDURE — 71275 CT ANGIOGRAPHY CHEST: CPT

## 2022-03-01 PROCEDURE — 81001 URINALYSIS AUTO W/SCOPE: CPT

## 2022-03-01 PROCEDURE — 87186 SC STD MICRODIL/AGAR DIL: CPT

## 2022-03-01 PROCEDURE — 6360000004 HC RX CONTRAST MEDICATION: Performed by: RADIOLOGY

## 2022-03-01 PROCEDURE — 2700000000 HC OXYGEN THERAPY PER DAY

## 2022-03-01 PROCEDURE — 84484 ASSAY OF TROPONIN QUANT: CPT

## 2022-03-01 PROCEDURE — 6360000002 HC RX W HCPCS: Performed by: INTERNAL MEDICINE

## 2022-03-01 PROCEDURE — 6360000002 HC RX W HCPCS: Performed by: STUDENT IN AN ORGANIZED HEALTH CARE EDUCATION/TRAINING PROGRAM

## 2022-03-01 PROCEDURE — 96374 THER/PROPH/DIAG INJ IV PUSH: CPT

## 2022-03-01 PROCEDURE — 87088 URINE BACTERIA CULTURE: CPT

## 2022-03-01 PROCEDURE — 6370000000 HC RX 637 (ALT 250 FOR IP): Performed by: INTERNAL MEDICINE

## 2022-03-01 PROCEDURE — 6360000002 HC RX W HCPCS: Performed by: EMERGENCY MEDICINE

## 2022-03-01 PROCEDURE — 96375 TX/PRO/DX INJ NEW DRUG ADDON: CPT

## 2022-03-01 PROCEDURE — 96376 TX/PRO/DX INJ SAME DRUG ADON: CPT

## 2022-03-01 RX ORDER — DEXTROSE MONOHYDRATE 25 G/50ML
12.5 INJECTION, SOLUTION INTRAVENOUS PRN
Status: DISCONTINUED | OUTPATIENT
Start: 2022-03-01 | End: 2022-03-05 | Stop reason: HOSPADM

## 2022-03-01 RX ORDER — DEXTROSE MONOHYDRATE 50 MG/ML
100 INJECTION, SOLUTION INTRAVENOUS PRN
Status: DISCONTINUED | OUTPATIENT
Start: 2022-03-01 | End: 2022-03-05 | Stop reason: HOSPADM

## 2022-03-01 RX ORDER — GABAPENTIN 300 MG/1
300 CAPSULE ORAL 3 TIMES DAILY
Status: DISCONTINUED | OUTPATIENT
Start: 2022-03-01 | End: 2022-03-05 | Stop reason: HOSPADM

## 2022-03-01 RX ORDER — ONDANSETRON 2 MG/ML
4 INJECTION INTRAMUSCULAR; INTRAVENOUS EVERY 6 HOURS PRN
Status: DISCONTINUED | OUTPATIENT
Start: 2022-03-01 | End: 2022-03-05 | Stop reason: HOSPADM

## 2022-03-01 RX ORDER — MORPHINE SULFATE 2 MG/ML
4 INJECTION, SOLUTION INTRAMUSCULAR; INTRAVENOUS ONCE
Status: COMPLETED | OUTPATIENT
Start: 2022-03-01 | End: 2022-03-01

## 2022-03-01 RX ORDER — POTASSIUM CHLORIDE 20 MEQ/1
20 TABLET, EXTENDED RELEASE ORAL DAILY
Status: DISCONTINUED | OUTPATIENT
Start: 2022-03-01 | End: 2022-03-05 | Stop reason: HOSPADM

## 2022-03-01 RX ORDER — CALCIUM CARBONATE 500(1250)
500 TABLET ORAL DAILY
Status: DISCONTINUED | OUTPATIENT
Start: 2022-03-01 | End: 2022-03-05 | Stop reason: HOSPADM

## 2022-03-01 RX ORDER — LOSARTAN POTASSIUM AND HYDROCHLOROTHIAZIDE 25; 100 MG/1; MG/1
1 TABLET ORAL DAILY
Status: DISCONTINUED | OUTPATIENT
Start: 2022-03-01 | End: 2022-03-01 | Stop reason: CLARIF

## 2022-03-01 RX ORDER — ACETAMINOPHEN 500 MG
500 TABLET ORAL EVERY 4 HOURS PRN
Status: DISCONTINUED | OUTPATIENT
Start: 2022-03-01 | End: 2022-03-05 | Stop reason: HOSPADM

## 2022-03-01 RX ORDER — FUROSEMIDE 20 MG/1
20 TABLET ORAL DAILY
Status: DISCONTINUED | OUTPATIENT
Start: 2022-03-01 | End: 2022-03-05 | Stop reason: HOSPADM

## 2022-03-01 RX ORDER — PROMETHAZINE HYDROCHLORIDE 25 MG/ML
12.5 INJECTION, SOLUTION INTRAMUSCULAR; INTRAVENOUS ONCE
Status: COMPLETED | OUTPATIENT
Start: 2022-03-01 | End: 2022-03-01

## 2022-03-01 RX ORDER — M-VIT,TX,IRON,MINS/CALC/FOLIC 27MG-0.4MG
1 TABLET ORAL DAILY
Status: DISCONTINUED | OUTPATIENT
Start: 2022-03-01 | End: 2022-03-05 | Stop reason: HOSPADM

## 2022-03-01 RX ORDER — HYDROCHLOROTHIAZIDE 25 MG/1
25 TABLET ORAL DAILY
Status: DISCONTINUED | OUTPATIENT
Start: 2022-03-01 | End: 2022-03-05 | Stop reason: HOSPADM

## 2022-03-01 RX ORDER — POLYETHYLENE GLYCOL 3350 17 G/17G
17 POWDER, FOR SOLUTION ORAL DAILY
Status: DISCONTINUED | OUTPATIENT
Start: 2022-03-01 | End: 2022-03-05 | Stop reason: HOSPADM

## 2022-03-01 RX ORDER — LOSARTAN POTASSIUM 50 MG/1
100 TABLET ORAL DAILY
Status: DISCONTINUED | OUTPATIENT
Start: 2022-03-01 | End: 2022-03-05 | Stop reason: HOSPADM

## 2022-03-01 RX ORDER — SODIUM CHLORIDE 0.9 % (FLUSH) 0.9 %
10 SYRINGE (ML) INJECTION PRN
Status: COMPLETED | OUTPATIENT
Start: 2022-03-01 | End: 2022-03-01

## 2022-03-01 RX ORDER — NICOTINE POLACRILEX 4 MG
15 LOZENGE BUCCAL PRN
Status: DISCONTINUED | OUTPATIENT
Start: 2022-03-01 | End: 2022-03-05 | Stop reason: HOSPADM

## 2022-03-01 RX ORDER — ATORVASTATIN CALCIUM 10 MG/1
10 TABLET, FILM COATED ORAL DAILY
Status: DISCONTINUED | OUTPATIENT
Start: 2022-03-01 | End: 2022-03-05 | Stop reason: HOSPADM

## 2022-03-01 RX ORDER — DULOXETIN HYDROCHLORIDE 20 MG/1
20 CAPSULE, DELAYED RELEASE ORAL EVERY OTHER DAY
Status: DISCONTINUED | OUTPATIENT
Start: 2022-03-01 | End: 2022-03-05 | Stop reason: HOSPADM

## 2022-03-01 RX ADMIN — MORPHINE SULFATE 4 MG: 2 INJECTION, SOLUTION INTRAMUSCULAR; INTRAVENOUS at 00:30

## 2022-03-01 RX ADMIN — MORPHINE SULFATE 4 MG: 2 INJECTION, SOLUTION INTRAMUSCULAR; INTRAVENOUS at 02:21

## 2022-03-01 RX ADMIN — ONDANSETRON 4 MG: 2 INJECTION INTRAMUSCULAR; INTRAVENOUS at 11:08

## 2022-03-01 RX ADMIN — METFORMIN HYDROCHLORIDE 500 MG: 500 TABLET ORAL at 11:46

## 2022-03-01 RX ADMIN — Medication 1 TABLET: at 11:46

## 2022-03-01 RX ADMIN — DULOXETINE HYDROCHLORIDE 20 MG: 20 CAPSULE, DELAYED RELEASE ORAL at 13:05

## 2022-03-01 RX ADMIN — ACETAMINOPHEN 500 MG: 500 TABLET ORAL at 13:05

## 2022-03-01 RX ADMIN — LOSARTAN POTASSIUM 100 MG: 50 TABLET, FILM COATED ORAL at 12:00

## 2022-03-01 RX ADMIN — IOPAMIDOL 90 ML: 755 INJECTION, SOLUTION INTRAVENOUS at 00:43

## 2022-03-01 RX ADMIN — ATORVASTATIN CALCIUM 10 MG: 10 TABLET, FILM COATED ORAL at 11:46

## 2022-03-01 RX ADMIN — METFORMIN HYDROCHLORIDE 500 MG: 500 TABLET ORAL at 16:37

## 2022-03-01 RX ADMIN — POTASSIUM CHLORIDE 20 MEQ: 1500 TABLET, EXTENDED RELEASE ORAL at 11:46

## 2022-03-01 RX ADMIN — Medication 10 ML: at 00:43

## 2022-03-01 RX ADMIN — FUROSEMIDE 20 MG: 20 TABLET ORAL at 11:46

## 2022-03-01 RX ADMIN — GABAPENTIN 300 MG: 300 CAPSULE ORAL at 12:00

## 2022-03-01 RX ADMIN — CEFTRIAXONE 1000 MG: 1 INJECTION, POWDER, FOR SOLUTION INTRAMUSCULAR; INTRAVENOUS at 07:59

## 2022-03-01 RX ADMIN — GABAPENTIN 300 MG: 300 CAPSULE ORAL at 21:40

## 2022-03-01 RX ADMIN — HYDROCHLOROTHIAZIDE 25 MG: 25 TABLET ORAL at 11:46

## 2022-03-01 RX ADMIN — APIXABAN 10 MG: 5 TABLET, FILM COATED ORAL at 21:40

## 2022-03-01 RX ADMIN — Medication 500 MG: at 11:46

## 2022-03-01 RX ADMIN — POLYETHYLENE GLYCOL 3350 17 G: 17 POWDER, FOR SOLUTION ORAL at 11:47

## 2022-03-01 RX ADMIN — APIXABAN 10 MG: 5 TABLET, FILM COATED ORAL at 11:47

## 2022-03-01 RX ADMIN — PROMETHAZINE HYDROCHLORIDE 12.5 MG: 25 INJECTION, SOLUTION INTRAMUSCULAR; INTRAVENOUS at 08:00

## 2022-03-01 ASSESSMENT — PAIN DESCRIPTION - FREQUENCY
FREQUENCY: CONTINUOUS

## 2022-03-01 ASSESSMENT — PAIN DESCRIPTION - PROGRESSION
CLINICAL_PROGRESSION: GRADUALLY IMPROVING
CLINICAL_PROGRESSION: GRADUALLY IMPROVING

## 2022-03-01 ASSESSMENT — PAIN DESCRIPTION - ORIENTATION: ORIENTATION: MID

## 2022-03-01 ASSESSMENT — PAIN SCALES - GENERAL
PAINLEVEL_OUTOF10: 7
PAINLEVEL_OUTOF10: 4
PAINLEVEL_OUTOF10: 5
PAINLEVEL_OUTOF10: 10
PAINLEVEL_OUTOF10: 8
PAINLEVEL_OUTOF10: 10

## 2022-03-01 ASSESSMENT — PAIN DESCRIPTION - ONSET
ONSET: ON-GOING

## 2022-03-01 ASSESSMENT — PAIN - FUNCTIONAL ASSESSMENT
PAIN_FUNCTIONAL_ASSESSMENT: PREVENTS OR INTERFERES SOME ACTIVE ACTIVITIES AND ADLS

## 2022-03-01 ASSESSMENT — PAIN DESCRIPTION - LOCATION
LOCATION: BACK;ABDOMEN
LOCATION: ABDOMEN
LOCATION: ABDOMEN
LOCATION: ABDOMEN;BACK

## 2022-03-01 ASSESSMENT — PAIN DESCRIPTION - PAIN TYPE
TYPE: ACUTE PAIN

## 2022-03-01 ASSESSMENT — PAIN DESCRIPTION - DESCRIPTORS: DESCRIPTORS: ACHING;SHARP;DISCOMFORT

## 2022-03-01 NOTE — DISCHARGE SUMMARY
Physician Discharge Summary     Patient ID:  Antonia Roberts  [id number]  78 y.o.  1940    Admit date: 2/19/2022    Discharge date and time: 2/28/2022  6:00 PM     Admission Diagnoses: Superior mesenteric artery thrombosis ([address] [K55.069]  Limb ischemia [I99.8]  Left axillary artery thrombus (HCC) [I74.2]  Acute saddle pulmonary embolism with acute cor pulmonale (HCC) [I26.02]  Ischemia of left upper extremity [I99.8]  Multiple pulmonary emboli (HCC) [I26.99]    Discharge Diagnoses:   Extensive bilateral pulmonary emboli  Mesenteric arterial embolism  Left subclavian arterial embolism  Acute blood loss anemia  Patient Active Problem List   Diagnosis    Essential hypertension, benign    Lymphedema    Osteoarthritis    Hyperlipidemia    Anxiety    Acute mesenteric arterial occlusion (HCC)    Acute saddle pulmonary embolism with acute cor pulmonale (HCC)    Occlusion of left subclavian artery    Limb ischemia    Multiple pulmonary emboli (HCC)    Back pain       Consults: Vascular surgery    Procedures:   IVC filter placement  Mesenteric arterial embolectomy  Left subclavian arterial embolectomy    Hospital Course:   78-year-old woman presented to emergency room with left arm numbness and shortness of breath  Patient was found to have left subclavian arterial embolism mesenteric arterial embolism and bilateral pulmonary emboli  Patient underwent IVC filter placement along with mesenteric arterial embolectomy and left subclavian arterial embolectomy  Postoperatively she did well  Heparin infusion was initiated on admission  This was changed to Eliquis loading dose of 10 mg twice daily  She was stable at the time of discharge    Discharge Exam:  See progress note from today    Disposition: Subacute rehab  Stable at the time of discharge  Patient Instructions:   Discharge Medication List as of 2/28/2022  4:18 PM      START taking these medications    Details   !! apixaban (ELIQUIS) 5 MG TABS tablet Take 1 tablet by mouth 2 times daily, Disp-60 tabletDC to SNF      polyethylene glycol (GLYCOLAX) 17 g packet Take 17 g by mouth daily, Disp-527 g, R-1DC to SNF      !! apixaban (ELIQUIS) 5 MG TABS tablet Take 2 tablets by mouth 2 times daily for 5 doses, Disp-60 tabletDC to SNF       ! ! - Potential duplicate medications found. Please discuss with provider. CONTINUE these medications which have CHANGED    Details   gabapentin (NEURONTIN) 300 MG capsule Take 1 capsule by mouth 3 times daily for 30 days. , Disp-90 capsule, R-3DC to SNF      DULoxetine (CYMBALTA) 20 MG extended release capsule Take 1 capsule by mouth every other day, Disp-30 capsule, R-3DC to SNF         CONTINUE these medications which have NOT CHANGED    Details   furosemide (LASIX) 20 MG tablet Take 1 tablet by mouth daily, Disp-30 tablet, R-3Normal      potassium chloride (KLOR-CON M) 20 MEQ extended release tablet Take 1 tablet by mouth daily, Disp-30 tablet, R-3Normal      metFORMIN (GLUCOPHAGE) 500 MG tablet Take 1 tablet by mouth 2 times daily (with meals), Disp-180 tablet, R-3Please DeliverNormal      losartan-hydrochlorothiazide (HYZAAR) 100-25 MG per tablet Take 1 tablet by mouth daily, Disp-90 tablet, R-5Normal      atorvastatin (LIPITOR) 20 MG tablet Take 1 tablet by mouth daily, Disp-90 tablet, R-3Normal      Multiple Vitamins-Minerals (THERAPEUTIC MULTIVITAMIN-MINERALS) tablet Take 1 tablet by mouth daily      calcium carbonate (OSCAL) 500 MG TABS tablet Take 500 mg by mouth daily Indications: taking 3 timew a week [facility]      [facility] Starting 10/26[facility], Until Discontinued, Disp-1 each, R-0, Print5 years         STOP taking these medications       amitriptyline (ELAVIL) 25 MG tablet Comments:   Reason for Stopping:         cyclobenzaprine (FLEXERIL) 10 MG tablet Comments:   Reason for Stopping:             Activity: As tolerated  Diet: General    Follow-up with PCP and vascular surgery    Note that over 40 minutes was

## 2022-03-01 NOTE — CONSULTS
VASCULAR SURGERY  CONSULT NOTE  3/1/2022    Chief Complaint   Patient presents with    Back Pain     Pt from [facility]. Pt discharged today at 5pm after having back surgery Saturday. Pt alert and oriented. Increased back pain, unknown what pain medications pt is on.  Emesis     Pt sent to ED from nursing home for vomiting and hypertension. Pt had episode of vomiting PTA. HPI  Anoop Farley is a 78 y.o. female for whom vascular surgery was consulted after presenting with back pain, nausea and emesis. The patient is known to the service during her most recent hospitalization where she underwent SMA, LUE embolectomy and IVCF placement for diffuse arterial and venous thrombus on 2/19/22. Notably, she has not had s/s of mesenteric ischemia prior to or after intervention on 2/19. She was discharged to a nursing home. She ambulated more than she had during her entire hospitalization and performed a transfer from standing to a wheelchair prior to the start of her symptoms. She had sudden onset lower back pain associated with nausea and emesis of undigested food and pills that she had recently taken. She presented to the hospital for evaluation. Denies N/V otherwise, abdominal pain, hematochezia, melenic stools, PO intolerance, fevers, or other back pain. Given her symptom complex, she underwent CTA of the chest abdomen and pelvis which was negative for dissection but did show a hematoma distal to the take off of the SMA that was larger than appeared on prior CT scan performed on 2/23/22. Since arrival, her back pain is better controlled with pain medications. Her back pain is at the lower back and nothing seems to make the pain better or worse. It does not radiate anywhere. She has had this type of back pain in the past and she has received injections for her back pain with relief. She is neurovascularly intact.  Her abdomen is soft, non-distended, with minimal TTP at the L mid abdomen without rebound tenderness or guarding. She has not had any other episodes of emesis since arrival to the hospital.    Labs are remarkable for elevated WBC to [address] and normal lactic acid. Hgb 10.5 which is elevated compared to prior 9.2 a day prior. Patient Active Problem List   Diagnosis    Essential hypertension, benign    Lymphedema    Osteoarthritis    Hyperlipidemia    Anxiety    Acute mesenteric arterial occlusion (HCC)    Acute saddle pulmonary embolism with acute cor pulmonale (HCC)    Occlusion of left subclavian artery    Limb ischemia    Multiple pulmonary emboli (HCC)       Past Medical History:   Diagnosis Date    Anxiety     Arthritis     osteoarthritis    Depression     Diverticulitis     Dupuytren's contracture     Hyperlipidemia     Hypertension     Hypothyroidism     Lymphedema     Pre-diabetes        Past Surgical History:   Procedure Laterality Date    ABDOMINAL AORTIC ANEURYSM REPAIR N/A 2/19/2022    EXPLORATORY LAPAROTOMY, MESENTERIC ARTERY EMBOLECTOMY performed by Mohini Pederson MD at [facility] Left 2/19/2022    LEFT ARM  EMBOLECTOMY performed by Mohini Pederson MD at [facility] Right 2/19/2022    VENA CAVA FILTER INSERTION performed by Mohini Pederson MD at [address]       Prior to Admission medications    Medication Sig Start Date End Date Taking? Authorizing Provider   apixaban (ELIQUIS) 5 MG TABS tablet Take 1 tablet by mouth 2 times daily 3/3/22  Yes Bertha Breen MD   gabapentin (NEURONTIN) 300 MG capsule Take 1 capsule by mouth 3 times daily for 30 days.  2/28/22 3/30/22 Yes Bertha Breen MD   DULoxetine (CYMBALTA) 20 MG extended release capsule Take 1 capsule by mouth every other day 3/2/22  Yes Bertha Breen MD   polyethylene glycol (GLYCOLAX) 17 g packet Take 17 g by mouth daily 3/1/22 3/31/22 Yes Bertha Breen MD   furosemide (LASIX) 20 MG tablet Take 1 tablet by mouth daily 7/6/20  Yes Rosalie MD Nicki   potassium chloride (KLOR-CON M) 20 MEQ extended release tablet Take 1 tablet by mouth daily 7/6/20  Yes Patti No MD   metFORMIN (GLUCOPHAGE) 500 MG tablet Take 1 tablet by mouth 2 times daily (with meals) 4/6/20  Yes Patti oN MD   losartan-hydrochlorothiazide (HYZAAR) 100-25 MG per tablet Take 1 tablet by mouth daily 1/24/20  Yes Patti No MD   atorvastatin (LIPITOR) 20 MG tablet Take 1 tablet by mouth daily 2/22/19  Yes Patti No MD   Multiple Vitamins-Minerals (THERAPEUTIC MULTIVITAMIN-MINERALS) tablet Take 1 tablet by mouth daily   Yes Historical Provider, MD   calcium carbonate (OSCAL) 500 MG TABS tablet Take 500 mg by mouth daily Indications: taking 3 timew a week    Yes Historical Provider, MD       No Known Allergies    History reviewed. No pertinent family history. The patient has a family history that is negative for severe cardiovascular or respiratory issues, negative for reaction to anesthesia. Social History     Tobacco Use    Smoking status: Never Smoker    Smokeless tobacco: Never Used   Substance Use Topics    Alcohol use: No     Alcohol/week: 0.0 standard drinks    Drug use: Not on file           Review of Systems:  A complete 10 system review was performed and are otherwise negative unless mentioned in the above HPI. Specific negatives are listed below but may not include all those reviewed.      General ROS: negative obtundation, AMS  ENT ROS: negative rhinorrhea, epistaxis  Allergy and Immunology ROS: negative itchy/watery eyes or nasal congestion  Hematological and Lymphatic ROS: negative spontaneous bleeding or bruising  Endocrine ROS: negative  lethargy, mood swings, palpitations or polydipsia/polyuria  Respiratory ROS: negative sputum changes, stridor, tachypnea or wheezing  Cardiovascular ROS: negative for - loss of consciousness, murmur or orthopnea  Gastrointestinal ROS: negative for - hematochezia or hematemesis; positive for nausea and emesis (NBNB)  Genito-Urinary ROS: negative for -  genital discharge or hematuria  Musculoskeletal ROS: negative for - focal weakness, gangrene; positive for lower back pain  Psych/Neuro ROS: negative for - visual or auditory hallucinations, suicidal ideation         PHYSICAL EXAM:  BP (!) 168/90   Pulse 88   Temp 98.6 °F (37 °C) (Oral)   Resp 18   Ht 5' 2\" (1.575 m)   Wt 194 lb (88 kg)   SpO2 97%   BMI 35.48 kg/m²     General appearance: NAD, lying on side  Head: NCAT, PERRLA, EOMI, red conjunctiva  Neck: supple, no masses, trachea midline  Lungs: Equal chest rise bilaterally, no accessory muscle use  Heart: warm throughout; bilateral carotid, brachial, radial and femoral pulses 2+  Abdomen: soft, mildly TTP at the L mid abdomen without rebound or guarding; staples at midline incision which is without erythema or drainage; non distended; no palpable masses  Skin: as above  Gu: no cva tenderness  Extremities: atraumatic, no focal motor deficits, no open wounds  Back: no TTP along the spine, has pain at the lower back without radiation; no stepoffs or deformities  Psych: No tremor, visual hallucinations      LABS:  Recent Labs     02/27/22  0540 02/28/22  0606 02/28/22  2316   WBC 10.2 10.2 15.0*   HGB 8.8* 9.2* 10.5*   HCT 29.4* 30.7* 34.7    425 498*       Recent Labs     02/27/22  0540 02/28/22  0606 02/28/22  2316    140 139   K 4.1 4.2 3.7   CL 98 98 93*   CO2 35* 32* 34*   BUN 24* 29* 29*   CREATININE 0.8 0.7 0.9   CALCIUM 8.7 9.4 9.9       Recent Labs     02/27/22  0540 02/28/22  0606 02/28/22  2316   LIPASE  --   --  45   BILITOT 0.3 0.3 0.5   AST 41* 23 23   ALT 66* 49* 51*   ALKPHOS 132* 123* 135*   LABALBU 2.6* 2.9* 3.5         RADIOLOGY:  CTA CHEST W CONTRAST    Result Date: 3/1/2022  EXAMINATION: CTA OF THE CHEST 3/1/2022 12:40 am TECHNIQUE: CTA of the chest was performed after the administration of intravenous contrast.  Multiplanar reformatted images are provided for review. MIP images are provided for review. Dose modulation, iterative reconstruction, and/or weight based adjustment of the mA/kV was utilized to reduce the radiation dose to as low as reasonably achievable. COMPARISON: None. HISTORY: ORDERING SYSTEM PROVIDED HISTORY: back pain, hypertenstion; history PE; r/o dissection, r/o PE TECHNOLOGIST PROVIDED HISTORY: Reason for exam:->back pain, hypertenstion; history PE; r/o dissection, r/o PE Decision Support Exception - unselect if not a suspected or confirmed emergency medical condition->Emergency Medical Condition (MA) What reading provider will be dictating this exam?->CRC FINDINGS: Pulmonary Arteries/aorta: Again seen is large pulmonary embolus within the main right pulmonary artery extending into lobar arteries. There is been only slight improvement in the interval.  There is no thoracic aortic aneurysm. There is no aortic dissection seen. There is calcified plaque throughout thoracic aorta. Mediastinum: No evidence of mediastinal lymphadenopathy. The heart and pericardium demonstrate no acute abnormality. There are coronary artery calcifications. Lungs/pleura: There is some peripheral interstitial prominence which is similar to prior may be chronic disease. There is some atelectasis at the lung bases. There is no pneumothorax or pleural effusion. Upper Abdomen: Images through the upper abdomen demonstrate distended gallbladder. There is a 6 cm cyst in the right hepatic lobe. Soft Tissues/Bones: No acute bone or soft tissue abnormality. 1. Large pulmonary embolus involving right main pulmonary artery extending into lobar arteries. There is been only slight improvement since the prior. 2. No aortic aneurysm or dissection seen. 3. Unchanged mild interstitial prominence peripherally which may be chronic disease.      CTA ABDOMEN PELVIS W CONTRAST    Result Date: 3/1/2022  EXAMINATION: CTA OF THE ABDOMEN AND PELVIS WITH CONTRAST 3/1/2022 12:40 am: TECHNIQUE: CTA of the abdomen and pelvis was performed with the administration of intravenous contrast. Multiplanar reformatted images are provided for review. MIP images are provided for review. Dose modulation, iterative reconstruction, and/or weight based adjustment of the mA/kV was utilized to reduce the radiation dose to as low as reasonably achievable. COMPARISON: 02/23/2022 celiac trunk/axis Jeannie HISTORY: ORDERING SYSTEM PROVIDED HISTORY: abdominal pain, back pain, hypertension; r/o dissection TECHNOLOGIST PROVIDED HISTORY: Reason for exam:->abdominal pain, back pain, hypertension; r/o dissection Decision Support Exception - unselect if not a suspected or confirmed emergency medical condition->Emergency Medical Condition (MA) What reading provider will be dictating this exam?->CRC FINDINGS: CTA ABDOMEN: There is no abdominal aortic aneurysm or dissection. Aortic branch vessels including celiac trunk/axis, SMA and renal arteries are patent without significant stenosis or evidence of occlusion. There is a 6 cm cyst in the right hepatic lobe which is unchanged. The spleen, adrenal glands and kidneys demonstrate no acute abnormality. There is a a worsening phlegmonous masslike area in the right of midline abdomen located posterior to SMA branches. This measures 4.8 x 3.7 cm. It is source is unclear. There are some surgical clips in the region. It is located inferior to the pancreatic head and anterior to the distal duodenum. There are no findings of intestinal obstruction. The appendix is normal. There is diverticulosis throughout the colon. There is no evidence for acute diverticulitis. There is no free air. Gallbladder is distended but appears otherwise unremarkable. There is some inflammatory change in the anterior abdomen at the level of abdominal incision. This is unchanged. There is an IVC filter present. CTA PELVIS: There is no iliac artery aneurysm or occlusion.   There is no evidence of iliac stenosis. Bladder is unremarkable in appearance. There is no abnormal pelvic mass or fluid collection seen. There is sigmoid diverticulosis without evidence for diverticulitis. There is interval development of a phlegmonous mass in the right of midline located posterior to mesenteric vessels and adjacent to distal duodenum. There are surgical clips in the region. Source is unclear. This could emanate from duodenal inflammation or less likely emanate from pancreatitis. No abscess identified. Intra-abdominal inflammatory change anteriorly the level of abdominal incision which appears unchanged. No aortic aneurysm or evidence for dissection. Pulmonary embolism seen in right main pulmonary artery and lobar branches, similar prior. I have reviewed relevant labs/imaging from this admission and interpretation is included in my assessment and plan. ASSESSMENT:  78 y.o. female with sudden onset back pain and x1 episode of N/V who is s/p SMA and LUE embolectomy and IVCF placement for arterial and venous thrombosis on 2/19/22. There is a hematoma at the distal proximal SMA that is larger than prior on 2/23 CT scan but without extravasation on well timed CT scan arterial phase. Back pain and N/V is unrelated to surgical issue. PLAN:  - admit medicine  - trend H/H q6 - if stable can stop - monitoring for hematoma expansion above  - npo for now  - IVF  - if emesis, consider NG decompression to prevent aspiration  - PT/OT evaluation for disposition    Discussed with Dr. Garcia Slider. Richard Peterson MD  General Surgery Resident, PGY-4    Electronically signed on 3/1/22 at 3:39 AM EST      Agree. CAT scan reviewed. Images consistent with a hematoma around the operative site and not abscess. Continue anticoagulation. We will likely remove staples prior to discharge.

## 2022-03-01 NOTE — ED PROVIDER NOTES
[address]  Department of Emergency Medicine     Written by: Merced Bryant DO  Patient Name: Litzy Hopkins  Attending Provider: Germaine Martinez DO  Admit Date: 2022 10:34 PM  MRN: [id number]    : 1940        Chief Complaint   Patient presents with    Back Pain     Pt from [facility]. Pt discharged today at 5pm after having back surgery Saturday. Pt alert and oriented. Increased back pain, unknown what pain medications pt is on.  Emesis     Pt sent to ED from nursing home for vomiting and hypertension. Pt had episode of vomiting PTA.     - Chief complaint    HPI   Litzy Hopkins is a 78 y.o. female presenting to the ED for evaluation of Back Pain (Pt from [facility]. Pt discharged today at 5pm after having back surgery Saturday. Pt alert and oriented. Increased back pain, unknown what pain medications pt is on. ) and Emesis (Pt sent to ED from nursing home for vomiting and hypertension. Pt had episode of vomiting PTA. )    Please note, patient did NOT have back surgery recently; she had ex lap, IVC filter, embolectomy of SMA and left subclavian. Patient has a past medical history of HTN, HLD, anxiety, history of blood clots -- patient recently admitted for complicated medical diagnoses, including saddle pulmonary embolism, occlusion of left subclavian artery, and acute mesenteric arterial occlusion; she underwent ex lap with SMA embolectomy,  IVC filter placement, and left subclavian artery embolectomy; patient was admitted in the ICU from  until today ; she was discharged earlier today to nursing facility; daughter states she was stable and improved overall since discharge until about 1.5 hr PTA when she develop sudden onset diffuse back pain with nausea and x1 episode emesis. Also endorses abdominal pain, but states it is mild. She is on eliquis. Only taking tylenol for pain, but states she likely threw it up.  Daughter reports BP PTA was 151V systolic. Patient endorses chronic back pain but states this is severe and much worse than normal. Denies any falls or injuries; she did not have back pain while in the hospital. Patient's complaints are severe in severity, and persistent. Nothing makes it better, palpation of her back anywhere makes it worse. Denies any fevers, neck pain or stiffness, cough or sore throat, chest pain or palpitations, shortness of breath, diarrhea, black or bloody stools, urinary symptoms, numbness or tingling anywhere, lower extremity edema or tenderness. Review of Systems   Constitutional: Negative for chills and fever. HENT: Negative for congestion and sore throat. Eyes: Negative for visual disturbance. Respiratory: Negative for cough and shortness of breath. Cardiovascular: Negative for chest pain and palpitations. Gastrointestinal: Positive for nausea and vomiting. Negative for abdominal pain and diarrhea. Genitourinary: Negative for dysuria and frequency. Musculoskeletal: Positive for back pain. Negative for myalgias. Skin: Negative for rash and wound. Neurological: Negative for weakness and headaches. Psychiatric/Behavioral: Negative for confusion. All other systems reviewed and are negative. Physical Exam  Vitals and nursing note reviewed. Constitutional:       General: She is not in acute distress. Appearance: She is ill-appearing (appears very uncomfortable due to symptoms). HENT:      Head: Normocephalic and atraumatic. Right Ear: External ear normal.      Left Ear: External ear normal.      Nose: Nose normal. No rhinorrhea. Mouth/Throat:      Mouth: Mucous membranes are moist.      Pharynx: Oropharynx is clear. Eyes:      Extraocular Movements: Extraocular movements intact. Conjunctiva/sclera: Conjunctivae normal.      Pupils: Pupils are equal, round, and reactive to light. Cardiovascular:      Rate and Rhythm: Normal rate and regular rhythm.       Pulses: Normal pulses. Heart sounds: Normal heart sounds. Pulmonary:      Effort: Pulmonary effort is normal. No respiratory distress. Breath sounds: Normal breath sounds. No wheezing or rales. Comments: Diffuse back TTP, though exam limited as patient essentially endorses back pain everywhere and exam is non-focal, seems difficult to localize    Abdominal:      General: Bowel sounds are normal. There is no distension. Palpations: Abdomen is soft. Tenderness: There is abdominal tenderness (mild). There is no guarding or rebound. Comments: Midline abdominal incision stapled and appears CDI; mild generalized abdominal pain   Musculoskeletal:         General: Normal range of motion. Cervical back: Normal range of motion and neck supple. No rigidity or tenderness. Right lower leg: No edema. Left lower leg: No edema. Skin:     General: Skin is warm and dry. Capillary Refill: Capillary refill takes less than 2 seconds. Coloration: Skin is not jaundiced or pale. Neurological:      General: No focal deficit present. Mental Status: She is alert and oriented to person, place, and time. Sensory: No sensory deficit. Motor: No weakness. Psychiatric:         Mood and Affect: Mood normal.         Behavior: Behavior normal.          Procedures       [facility]     ED Course as of 03/01/22 0651   Tue Mar 01, 2022   0013 Patient reassessed, nausea is improved but back pain shows no improvement. [VG]   0025 Called CT, patient next in line. Marked ready. [VG]   0101 Patient reassessed; per repeat vitals she was 90% on room air; placed on 2L NC o2. Patient states 4mg IV morphine did nothing for her back pain. Additional 4 mg IV morphine ordered. [VG]   0140 CTA CHEST W CONTRAST  IMPRESSION:  1. Large pulmonary embolus involving right main pulmonary artery extending  into lobar arteries. There is been only slight improvement since the prior.   2. No aortic aneurysm or dissection seen. 3. Unchanged mild interstitial prominence peripherally which may be chronic  disease.    [VG]   0202 Of note, after speaking again with patient and her daughter, it was clarified that YES, patient is supposed to be on 2.5-3L supplemental oxygen since discharge from the hospital today. She is currently 97% and on 2L with normal WOB and no shortness of breath. [VG]   6705 Patient and her daughter updated again on results thus far. Patient is stable, vitals improving.  [VG]   [id number] ED attending spoke with Dr. Mercedes Kruse; will have resident see. [VG]   [id number] Surgery residents in room evaluating patient at this time. [VG]   2771 Spoke with Dr. Murali Castillo, discussed case, patient is accepted for admission. [VG]      ED Course User Index  [VG] Gabriela Meneses, DO       MDM    This is a 78 y.o. female presenting for evaluation of back pain; complicated recent medical history as noted above, patient is s/p ex lap and embolectomy of SMA and left subclavian on 2/19 as well as IVC filter placement and diagnosis of saddle pulmonary embolism at that time; she has been on Eliquis, was discharged from the hospital today in stable condition reportedly supposed to be on 2.5 L NC O2.  Presents today due to generalized back pain. Vitals initially significant for , improved during this encounter with analgesia. She was also nauseous, given antiemetics with improvement. Abdomen is soft and nontender, surgical incision site from previous ex lap appears CDI. CTA of chest abdomen pelvis ordered given patient's back pain and hypertension; shows no aortic aneurysm or evidence for dissection; does show pulmonary embolism on right main pulmonary artery and lobar branches which appears similar to prior, this is a known finding.   Notably, there is interval development of a \"phlegmonous mass\" in the right of midline, located posterior to mesenteric vessels and adjacent to the distal duodenum; consult placed to vascular surgery, patient seen by surgery residents in the ER; per consultant, there is no extravasation noted on CT but there is concern for possible hematoma, patient did have recent instrumentation however they recommend patient admitted at least for monitoring of her hemoglobin. Patient also having significant back pain which is difficult to control. Labs reviewed, Hb 10.5, WBC 15.0; both are increased from CBC yesterday. Given these findings as well as patient's recent admission and worsening symptoms and need for monitoring and further evaluation by surgery, decision made to admit. Discussed results and plan with the patient and her daughter who is at bedside, they voiced understanding and are amenable. Spoke with admitting provider, patient is accepted. Of note, labs additionally show UA positive for UTI; patient treated with IV rocephin. EKG reviewed and interpreted by me:  Rate is 82; rhythm is sinus; interpretation is NSR, LAD, , QRS 80, QTc 401, no ST elevations; T wave inversion in lead III which is noted on previous; stable as compared to most recent EKG. I have discussed this patient with my attending, who has seen the patient and agrees with this disposition. Patient was seen and evaluated by myself and my attending Barry Lopez DO. Assessment and Plan discussed with attending provider, please see attestation for final plan of care.         --------------------------------------------- PAST HISTORY ---------------------------------------------  Past Medical History:  has a past medical history of Anxiety, Arthritis, Depression, Diverticulitis, Dupuytren's contracture, Hyperlipidemia, Hypertension, Hypothyroidism, Lymphedema, and Pre-diabetes. Past Surgical History:  has a past surgical history that includes Hysterectomy; Vena Cava Filter Placement (Right, 2/19/2022); Abdominal aortic aneurysm repair (N/A, 2/19/2022); and embolectomy (Left, 2/19/2022).     Social History: reports that she has never smoked. She has never used smokeless tobacco. She reports that she does not drink alcohol. Family History: family history is not on file. The patients home medications have been reviewed. Allergies: Patient has no known allergies.     -------------------------------------------------- RESULTS -------------------------------------------------    LABS:  Results for orders placed or performed during the hospital encounter of 02/28/22   CBC with Auto Differential   Result Value Ref Range    WBC 15.0 (H) 4.5 - 11.5 E9/L    RBC 4.30 3.50 - 5.50 E12/L    Hemoglobin 10.5 (L) 11.5 - 15.5 g/dL    Hematocrit 34.7 34.0 - 48.0 %    MCV 80.7 80.0 - 99.9 fL    MCH 24.4 (L) 26.0 - 35.0 pg    MCHC 30.3 (L) 32.0 - 34.5 %    RDW 15.1 (H) 11.5 - 15.0 fL    Platelets 799 (H) 474 - 450 E9/L    MPV 8.9 7.0 - 12.0 fL    Neutrophils % 76.1 43.0 - 80.0 %    Immature Granulocytes % 3.2 0.0 - 5.0 %    Lymphocytes % 13.2 (L) 20.0 - 42.0 %    Monocytes % 6.1 2.0 - 12.0 %    Eosinophils % 1.0 0.0 - 6.0 %    Basophils % 0.4 0.0 - 2.0 %    Neutrophils Absolute 11.44 (H) 1.80 - 7.30 E9/L    Immature Granulocytes # 0.48 E9/L    Lymphocytes Absolute 1.99 1.50 - 4.00 E9/L    Monocytes Absolute 0.91 0.10 - 0.95 E9/L    Eosinophils Absolute 0.15 0.05 - 0.50 E9/L    Basophils Absolute 0.06 0.00 - 0.20 E9/L   Comprehensive Metabolic Panel w/ Reflex to MG   Result Value Ref Range    Sodium 139 132 - 146 mmol/L    Potassium reflex Magnesium 3.7 3.5 - 5.0 mmol/L    Chloride 93 (L) 98 - 107 mmol/L    CO2 34 (H) 22 - 29 mmol/L    Anion Gap 12 7 - 16 mmol/L    Glucose 185 (H) 74 - 99 mg/dL    BUN 29 (H) 6 - 23 mg/dL    CREATININE 0.9 0.5 - 1.0 mg/dL    GFR Non-African American 60 >=60 mL/min/1.73    GFR African American >60     Calcium 9.9 8.6 - 10.2 mg/dL    Total Protein 7.0 6.4 - 8.3 g/dL    Albumin 3.5 3.5 - 5.2 g/dL    Total Bilirubin 0.5 0.0 - 1.2 mg/dL    Alkaline Phosphatase 135 (H) 35 - 104 U/L    ALT 51 (H) 0 - 32 U/L    AST 23 0 - 31 U/L   Lipase   Result Value Ref Range    Lipase 45 13 - 60 U/L   Troponin   Result Value Ref Range    Troponin, High Sensitivity 17 (H) 0 - 9 ng/L   Brain Natriuretic Peptide   Result Value Ref Range    Pro- 0 - 450 pg/mL   Urinalysis with Microscopic   Result Value Ref Range    Color, UA Yellow Straw/Yellow    Clarity, UA Clear Clear    Glucose, Ur 100 (A) Negative mg/dL    Bilirubin Urine Negative Negative    Ketones, Urine 15 (A) Negative mg/dL    Specific Gravity, UA 1.010 1.005 - 1.030    Blood, Urine SMALL (A) Negative    pH, UA 7.0 5.0 - 9.0    Protein, UA Negative Negative mg/dL    Urobilinogen, Urine 0.2 <2.0 E.U./dL    Nitrite, Urine Negative Negative    Leukocyte Esterase, Urine TRACE (A) Negative    WBC, UA 5-10 (A) 0 - 5 /HPF    RBC, UA 5-10 (A) 0 - 2 /HPF    Bacteria, UA MANY (A) None Seen /HPF   Lactate, Sepsis   Result Value Ref Range    Lactic Acid, Sepsis 1.9 0.5 - 1.9 mmol/L   Lactate, Sepsis   Result Value Ref Range    Lactic Acid, Sepsis 1.4 0.5 - 1.9 mmol/L   Protime-INR   Result Value Ref Range    Protime 17.4 (H) 9.3 - 12.4 sec    INR 1.6    APTT   Result Value Ref Range    aPTT 35.8 (H) 24.5 - 35.1 sec   Troponin   Result Value Ref Range    Troponin, High Sensitivity 23 (H) 0 - 9 ng/L   Troponin   Result Value Ref Range    Troponin, High Sensitivity 28 (H) 0 - 9 ng/L   EKG 12 Lead   Result Value Ref Range    Ventricular Rate 82 BPM    Atrial Rate 82 BPM    P-R Interval 154 ms    QRS Duration 80 ms    Q-T Interval 344 ms    QTc Calculation (Bazett) 401 ms    P Axis 50 degrees    R Axis -7 degrees    T Axis 79 degrees       RADIOLOGY:  CTA CHEST W CONTRAST   Final Result   1. Large pulmonary embolus involving right main pulmonary artery extending   into lobar arteries. There is been only slight improvement since the prior. 2. No aortic aneurysm or dissection seen. 3. Unchanged mild interstitial prominence peripherally which may be chronic   disease.          CTA ABDOMEN PELVIS W CONTRAST   Final Result   There is interval development of a phlegmonous mass in the right of midline   located posterior to mesenteric vessels and adjacent to distal duodenum. There are surgical clips in the region. Source is unclear. This could emanate   from duodenal inflammation or less likely emanate from pancreatitis. No   abscess identified. Intra-abdominal inflammatory change anteriorly the level of abdominal   incision which appears unchanged. No aortic aneurysm or evidence for dissection. Pulmonary embolism seen in right main pulmonary artery and lobar branches,   similar prior. ------------------------- NURSING NOTES AND VITALS REVIEWED ---------------------------  Date / Time Roomed:  2/28/2022 10:34 PM  ED Bed Assignment:  24/24    The nursing notes within the ED encounter and vital signs as below have been reviewed. Patient Vitals for the past 24 hrs:   BP Temp Temp src Pulse Resp SpO2 Height Weight   03/01/22 0438 (!) 168/75 -- -- 88 20 92 % -- --   03/01/22 0203 (!) 168/90 -- -- -- -- -- -- --   03/01/22 0202 -- -- -- -- -- 97 % -- --   03/01/22 0043 (!) 194/86 -- -- 88 18 90 % -- --   02/28/22 2231 (!) 203/94 98.6 °F (37 °C) Oral 84 18 93 % 5' 2\" (1.575 m) 194 lb (88 kg)       Oxygen Saturation Interpretation: Normal on 2.5L NC O2    ------------------------------------------ PROGRESS NOTES ------------------------------------------  Re-evaluation(s):  Please see ED course    Counseling:  I have spoken with the patient and her daughter and discussed todays results, in addition to providing specific details for the plan of care and counseling regarding the diagnosis and prognosis.   Their questions are answered at this time and they are agreeable with the plan of admission.    --------------------------------- ADDITIONAL PROVIDER NOTES ---------------------------------  Consultations:  Please see ED course    This patient's ED course included: a personal history and physicial examination, re-evaluation prior to disposition, multiple bedside re-evaluations, IV medications, cardiac monitoring, continuous pulse oximetry and complex medical decision making and emergency management    This patient has remained hemodynamically stable during their ED course. Diagnosis:  1. Acute back pain, unspecified back location, unspecified back pain laterality    2. Other pulmonary embolism without acute cor pulmonale, unspecified chronicity ([facility] [address]    3. Urinary tract infection with hematuria, site unspecified        Disposition:  Patient's disposition: Admit to telemetry  Patient's condition is serious.        Indu Lemos DO  Resident  03/01/22 5821

## 2022-03-01 NOTE — ED NOTES
Pt alert oriented skin warm dry resp easy abd softly distended diffusely tender with hypoactive bowel sounds pt states pain better staples intact incision well approx without redness or drainage     Aixa Brooke RN  03/01/22 1721

## 2022-03-01 NOTE — H&P
MG extended release capsule, Take 1 capsule by mouth every other day  polyethylene glycol (GLYCOLAX) 17 g packet, Take 17 g by mouth daily  furosemide (LASIX) 20 MG tablet, Take 1 tablet by mouth daily  potassium chloride (KLOR-CON M) 20 MEQ extended release tablet, Take 1 tablet by mouth daily  metFORMIN (GLUCOPHAGE) 500 MG tablet, Take 1 tablet by mouth 2 times daily (with meals)  losartan-hydrochlorothiazide (HYZAAR) 100-25 MG per tablet, Take 1 tablet by mouth daily  atorvastatin (LIPITOR) 20 MG tablet, Take 1 tablet by mouth daily  Multiple Vitamins-Minerals (THERAPEUTIC MULTIVITAMIN-MINERALS) tablet, Take 1 tablet by mouth daily  calcium carbonate (OSCAL) 500 MG TABS tablet, Take 500 mg by mouth daily Indications: taking 3 timew a week     Allergies:    Patient has no known allergies. Social History:    reports that she has never smoked. She has never used smokeless tobacco. She reports that she does not drink alcohol. Family History:   family history is not on file. REVIEW OF SYSTEMS:  As above in the HPI, otherwise negative    PHYSICAL EXAM:    Vitals:  BP (!) 151/77   Pulse 90   Temp 98.3 °F (36.8 °C) (Temporal)   Resp 18   Ht 5' 2\" (1.575 m)   Wt 194 lb (88 kg)   SpO2 96%   BMI 35.48 kg/m²     General:  Awake, alert, oriented X 3. Well developed, well nourished, well groomed. In mild distress  HEENT:  Normocephalic, atraumatic. Pupils equal, round, reactive to light. No scleral icterus. No conjunctival injection. .  No nasal discharge. Neck:  Supple no adenopathy  Heart:  RRR, no murmurs, gallops, rubs  Lungs:  CTA bilaterally, bilat symmetrical expansion, no wheeze, rales, or rhonchi  Abdomen:   Bowel sounds present, soft, suprapubic tenderness noted, no masses, no organomegaly, no peritoneal signs  Abdominal wall incision looks clean and dry with staples intact  Extremities:  No clubbing, cyanosis, or edema  Left upper extremity incision is healing well  Skin:  Warm and dry, no open lesions or rash  Neuro:  Cranial nerves 2-12 intact, no focal deficits  Breast: deferred  Rectal: deferred  Genitalia:  deferred    LABS: Data reviewed in detail      ASSESSMENT:    Acute cystitis, catheterization associated  Recent IVC filter placement for pulmonary emboli  Recent left subclavian arterial embolectomy  Recent mesenteric arterial embolectomy  Thrombophilia disorder  Essential hypertension  Type 2 diabetes mellitus  Patient Active Problem List   Diagnosis    Essential hypertension, benign    Lymphedema    Osteoarthritis    Hyperlipidemia    Anxiety    Acute mesenteric arterial occlusion (HCC)    Acute saddle pulmonary embolism with acute cor pulmonale (HCC)    Occlusion of left subclavian artery    Limb ischemia    Multiple pulmonary emboli (HCC)    Back pain           PLAN:  IV Rocephin  Symptomatic treatment with Zofran  Resume home medications including the loading dose of Eliquis  Sliding scale with insulin  Questions answered to daughter satisfaction  Check urine culture    Kamryn Lund MD  11:30 AM  3/1/2022

## 2022-03-01 NOTE — ED NOTES
Pt alert oriented skin warm dry resp easy lungs cta abd softly distended diffusely tender bowel sounds hypoactive staples intact to abd pt c/o abd pain   Orysia BRANDON Sutton  03/01/22 5 Marco Horton RN  03/01/22 0045

## 2022-03-02 LAB
ALBUMIN SERPL-MCNC: 3.1 G/DL (ref 3.5–5.2)
ALP BLD-CCNC: 109 U/L (ref 35–104)
ALT SERPL-CCNC: 36 U/L (ref 0–32)
ANION GAP SERPL CALCULATED.3IONS-SCNC: 8 MMOL/L (ref 7–16)
AST SERPL-CCNC: 20 U/L (ref 0–31)
BASOPHILS ABSOLUTE: 0.07 E9/L (ref 0–0.2)
BASOPHILS RELATIVE PERCENT: 0.6 % (ref 0–2)
BILIRUB SERPL-MCNC: 0.4 MG/DL (ref 0–1.2)
BUN BLDV-MCNC: 19 MG/DL (ref 6–23)
CALCIUM SERPL-MCNC: 9.7 MG/DL (ref 8.6–10.2)
CHLORIDE BLD-SCNC: 98 MMOL/L (ref 98–107)
CO2: 34 MMOL/L (ref 22–29)
CREAT SERPL-MCNC: 0.7 MG/DL (ref 0.5–1)
EOSINOPHILS ABSOLUTE: 0.22 E9/L (ref 0.05–0.5)
EOSINOPHILS RELATIVE PERCENT: 1.8 % (ref 0–6)
GFR AFRICAN AMERICAN: >60
GFR NON-AFRICAN AMERICAN: >60 ML/MIN/1.73
GLUCOSE BLD-MCNC: 124 MG/DL (ref 74–99)
HCT VFR BLD CALC: 33.4 % (ref 34–48)
HEMOGLOBIN: 10 G/DL (ref 11.5–15.5)
IMMATURE GRANULOCYTES #: 0.13 E9/L
IMMATURE GRANULOCYTES %: 1 % (ref 0–5)
LYMPHOCYTES ABSOLUTE: 1.47 E9/L (ref 1.5–4)
LYMPHOCYTES RELATIVE PERCENT: 11.7 % (ref 20–42)
MCH RBC QN AUTO: 24.6 PG (ref 26–35)
MCHC RBC AUTO-ENTMCNC: 29.9 % (ref 32–34.5)
MCV RBC AUTO: 82.3 FL (ref 80–99.9)
METER GLUCOSE: 116 MG/DL (ref 74–99)
METER GLUCOSE: 132 MG/DL (ref 74–99)
METER GLUCOSE: 162 MG/DL (ref 74–99)
METER GLUCOSE: 178 MG/DL (ref 74–99)
MONOCYTES ABSOLUTE: 0.95 E9/L (ref 0.1–0.95)
MONOCYTES RELATIVE PERCENT: 7.6 % (ref 2–12)
NEUTROPHILS ABSOLUTE: 9.68 E9/L (ref 1.8–7.3)
NEUTROPHILS RELATIVE PERCENT: 77.3 % (ref 43–80)
PDW BLD-RTO: 14.9 FL (ref 11.5–15)
PLATELET # BLD: 476 E9/L (ref 130–450)
PMV BLD AUTO: 9.2 FL (ref 7–12)
POTASSIUM SERPL-SCNC: 4.4 MMOL/L (ref 3.5–5)
RBC # BLD: 4.06 E12/L (ref 3.5–5.5)
SODIUM BLD-SCNC: 140 MMOL/L (ref 132–146)
TOTAL PROTEIN: 6 G/DL (ref 6.4–8.3)
WBC # BLD: 12.5 E9/L (ref 4.5–11.5)

## 2022-03-02 PROCEDURE — 36415 COLL VENOUS BLD VENIPUNCTURE: CPT

## 2022-03-02 PROCEDURE — 6370000000 HC RX 637 (ALT 250 FOR IP): Performed by: INTERNAL MEDICINE

## 2022-03-02 PROCEDURE — 6360000002 HC RX W HCPCS: Performed by: INTERNAL MEDICINE

## 2022-03-02 PROCEDURE — 1200000000 HC SEMI PRIVATE

## 2022-03-02 PROCEDURE — 97535 SELF CARE MNGMENT TRAINING: CPT

## 2022-03-02 PROCEDURE — 80053 COMPREHEN METABOLIC PANEL: CPT

## 2022-03-02 PROCEDURE — 2700000000 HC OXYGEN THERAPY PER DAY

## 2022-03-02 PROCEDURE — 2580000003 HC RX 258: Performed by: INTERNAL MEDICINE

## 2022-03-02 PROCEDURE — 97165 OT EVAL LOW COMPLEX 30 MIN: CPT

## 2022-03-02 PROCEDURE — 97530 THERAPEUTIC ACTIVITIES: CPT

## 2022-03-02 PROCEDURE — 82962 GLUCOSE BLOOD TEST: CPT

## 2022-03-02 PROCEDURE — 85025 COMPLETE CBC W/AUTO DIFF WBC: CPT

## 2022-03-02 PROCEDURE — 97161 PT EVAL LOW COMPLEX 20 MIN: CPT

## 2022-03-02 RX ADMIN — Medication 1 TABLET: at 10:21

## 2022-03-02 RX ADMIN — POLYETHYLENE GLYCOL 3350 17 G: 17 POWDER, FOR SOLUTION ORAL at 10:21

## 2022-03-02 RX ADMIN — GABAPENTIN 300 MG: 300 CAPSULE ORAL at 10:21

## 2022-03-02 RX ADMIN — LOSARTAN POTASSIUM 100 MG: 50 TABLET, FILM COATED ORAL at 10:21

## 2022-03-02 RX ADMIN — APIXABAN 10 MG: 5 TABLET, FILM COATED ORAL at 10:20

## 2022-03-02 RX ADMIN — FUROSEMIDE 20 MG: 20 TABLET ORAL at 10:22

## 2022-03-02 RX ADMIN — HYDROCHLOROTHIAZIDE 25 MG: 25 TABLET ORAL at 10:21

## 2022-03-02 RX ADMIN — WATER 1000 MG: 1 INJECTION INTRAMUSCULAR; INTRAVENOUS; SUBCUTANEOUS at 10:20

## 2022-03-02 RX ADMIN — INSULIN LISPRO 2 UNITS: 100 INJECTION, SOLUTION INTRAVENOUS; SUBCUTANEOUS at 12:16

## 2022-03-02 RX ADMIN — METFORMIN HYDROCHLORIDE 500 MG: 500 TABLET ORAL at 08:15

## 2022-03-02 RX ADMIN — INSULIN LISPRO 1 UNITS: 100 INJECTION, SOLUTION INTRAVENOUS; SUBCUTANEOUS at 20:27

## 2022-03-02 RX ADMIN — ACETAMINOPHEN 500 MG: 500 TABLET ORAL at 13:18

## 2022-03-02 RX ADMIN — Medication 500 MG: at 10:21

## 2022-03-02 RX ADMIN — POTASSIUM CHLORIDE 20 MEQ: 1500 TABLET, EXTENDED RELEASE ORAL at 10:21

## 2022-03-02 RX ADMIN — ATORVASTATIN CALCIUM 10 MG: 10 TABLET, FILM COATED ORAL at 10:21

## 2022-03-02 RX ADMIN — APIXABAN 10 MG: 5 TABLET, FILM COATED ORAL at 20:27

## 2022-03-02 RX ADMIN — GABAPENTIN 300 MG: 300 CAPSULE ORAL at 20:26

## 2022-03-02 ASSESSMENT — PAIN DESCRIPTION - FREQUENCY: FREQUENCY: CONTINUOUS

## 2022-03-02 ASSESSMENT — PAIN DESCRIPTION - ORIENTATION: ORIENTATION: LOWER

## 2022-03-02 ASSESSMENT — PAIN DESCRIPTION - PAIN TYPE: TYPE: ACUTE PAIN

## 2022-03-02 ASSESSMENT — PAIN DESCRIPTION - PROGRESSION
CLINICAL_PROGRESSION: GRADUALLY IMPROVING
CLINICAL_PROGRESSION: GRADUALLY IMPROVING

## 2022-03-02 ASSESSMENT — PAIN SCALES - GENERAL: PAINLEVEL_OUTOF10: 1

## 2022-03-02 ASSESSMENT — PAIN DESCRIPTION - LOCATION: LOCATION: BACK

## 2022-03-02 ASSESSMENT — PAIN - FUNCTIONAL ASSESSMENT: PAIN_FUNCTIONAL_ASSESSMENT: PREVENTS OR INTERFERES SOME ACTIVE ACTIVITIES AND ADLS

## 2022-03-02 ASSESSMENT — PAIN DESCRIPTION - ONSET: ONSET: ON-GOING

## 2022-03-02 NOTE — PROGRESS NOTES
Faye Wood MD FACP  Internal medicine  Progress notes      CHIEF COMPLAINT: Back pain nausea      HISTORY OF PRESENT ILLNESS:    11/2022  78year-old woman seen in the emergency room at main [facility] earlier this morning  Spoke with the ER physician at the time of admission  Spoke with vascular surgery  Daughter was at bedside  Data reviewed in detail  Patient was discharged to subacute rehab yesterday after she underwent IVC filter placement, mesenteric arterial embolectomy, and left subclavian embolectomy  Patient required Riley catheter postoperatively which was removed subsequently  She was at the [facility] for about 5 hours when she developed nausea back pain and suprapubic pain  Urinalysis suggestive of UTI  Abnormal CT of the abdomen was reviewed  Vascular surgery felt it was a small hematoma not to be concerned about  She is being admitted for further management  3/2/2022  Patient was seen on the floor earlier this morning  She states she is feeling much better  Nausea resolved  Oral intake adequate  On 2 L of oxygen nasal cannula  Tolerating medications    Past Medical History:    Past Medical History:   Diagnosis Date    Anxiety     Arthritis     osteoarthritis    Depression     Diverticulitis     Dupuytren's contracture     Hyperlipidemia     Hypertension     Hypothyroidism     Lymphedema     Pre-diabetes        Past Surgical History:    Past Surgical History:   Procedure Laterality Date    ABDOMINAL AORTIC ANEURYSM REPAIR N/A 2/19/2022    EXPLORATORY LAPAROTOMY, MESENTERIC ARTERY EMBOLECTOMY performed by Nathanael Mckeon MD at [facility] Left 2/19/2022    LEFT ARM  EMBOLECTOMY performed by Nathanael Mckeon MD at [facility] Right 2/19/2022    VENA CAVA FILTER INSERTION performed by Nathanael Mckeon MD at [address] [place]       Medications Prior to Admission:    Medications Prior to Admission: [START ON 3/3/2022] apixaban (ELIQUIS) 5 MG TABS tablet, Take 1 tablet by mouth 2 times daily  gabapentin (NEURONTIN) 300 MG capsule, Take 1 capsule by mouth 3 times daily for 30 days. DULoxetine (CYMBALTA) 20 MG extended release capsule, Take 1 capsule by mouth every other day  polyethylene glycol (GLYCOLAX) 17 g packet, Take 17 g by mouth daily  furosemide (LASIX) 20 MG tablet, Take 1 tablet by mouth daily  potassium chloride (KLOR-CON M) 20 MEQ extended release tablet, Take 1 tablet by mouth daily  metFORMIN (GLUCOPHAGE) 500 MG tablet, Take 1 tablet by mouth 2 times daily (with meals)  losartan-hydrochlorothiazide (HYZAAR) 100-25 MG per tablet, Take 1 tablet by mouth daily  atorvastatin (LIPITOR) 20 MG tablet, Take 1 tablet by mouth daily  Multiple Vitamins-Minerals (THERAPEUTIC MULTIVITAMIN-MINERALS) tablet, Take 1 tablet by mouth daily  calcium carbonate (OSCAL) 500 MG TABS tablet, Take 500 mg by mouth daily Indications: taking 3 timew a week     Allergies:    Patient has no known allergies. Social History:    reports that she has never smoked. She has never used smokeless tobacco. She reports that she does not drink alcohol. Family History:   family history is not on file. REVIEW OF SYSTEMS:  As above in the HPI, otherwise negative    PHYSICAL EXAM:    Vitals:  /65   Pulse 95   Temp 95.9 °F (35.5 °C) (Temporal)   Resp 17   Ht 5' 2\" (1.575 m)   Wt 194 lb (88 kg)   SpO2 94%   BMI 35.48 kg/m²     General:  Awake, alert, oriented X 3. Well developed, well nourished, well groomed. In mild distress  HEENT:  Normocephalic, atraumatic. Pupils equal, round, reactive to light. No scleral icterus. No conjunctival injection. .  No nasal discharge. Neck:  Supple no adenopathy  Heart:  RRR, no murmurs, gallops, rubs  Lungs:  CTA bilaterally, bilat symmetrical expansion, no wheeze, rales, or rhonchi  Abdomen:   Bowel sounds present, soft, suprapubic tenderness noted, no masses, no organomegaly, no peritoneal signs  Abdominal wall incision looks clean and dry with staples intact  Extremities:  No clubbing, cyanosis, or edema  Left upper extremity incision is healing well  Skin:  Warm and dry, no open lesions or rash  Neuro:  Cranial nerves 2-12 intact, no focal deficits  Breast: deferred  Rectal: deferred  Genitalia:  deferred    LABS: Data reviewed in detail      ASSESSMENT:    Acute cystitis, catheterization associated  Recent IVC filter placement for pulmonary emboli  Recent left subclavian arterial embolectomy  Recent mesenteric arterial embolectomy  Thrombophilia disorder  Essential hypertension  Type 2 diabetes mellitus  Patient Active Problem List   Diagnosis    Essential hypertension, benign    Lymphedema    Osteoarthritis    Hyperlipidemia    Anxiety    Acute mesenteric arterial occlusion (HCC)    Acute saddle pulmonary embolism with acute cor pulmonale (HCC)    Occlusion of left subclavian artery    Limb ischemia    Multiple pulmonary emboli (HCC)    Back pain           PLAN:  IV Rocephin  Symptomatic treatment with Zofran  Resumed home medications including the loading dose of Eliquis  Sliding scale with insulin  Questions answered to daughter satisfaction  Check urine culture    Joslyn Galo MD  10:19 AM  3/2/2022

## 2022-03-02 NOTE — PROGRESS NOTES
Vascular Surgery Progress Note    CC:  left subclavian artery occlusion, submassive pulmonary emboli and SMA embolus    Subjective: Feeling much better this morning. No abdominal pain. Back pain improving. Minimal p.o. intake yesterday but feels like eating this morning. Abdominal exam and bilateral lower extremity neurovascular exam stable.     IMPRESSION:   s/p VCF insertion, Ex Lap, SMA embolectomy, L UE embolectomy 2/19    PLAN: Continue to monitor bilateral lower extremity and left upper extremity neurovascular exam  Continue to monitor abdominal exam  PT/OT    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    Limb ischemia I99.8    Multiple pulmonary emboli (HCC) I26.99    Back pain M54.9       Current Medications:    dextrose        glucose, dextrose, glucagon (rDNA), dextrose, ondansetron, acetaminophen    atorvastatin  10 mg Oral Daily    calcium elemental  500 mg Oral Daily    DULoxetine  20 mg Oral Every Other Day    furosemide  20 mg Oral Daily    gabapentin  300 mg Oral TID    metFORMIN  500 mg Oral BID WC    polyethylene glycol  17 g Oral Daily    therapeutic multivitamin-minerals  1 tablet Oral Daily    potassium chloride  20 mEq Oral Daily    apixaban  10 mg Oral BID    [START ON 3/3/2022] apixaban  5 mg Oral BID    insulin lispro  0-12 Units SubCUTAneous TID WC    insulin lispro  0-6 Units SubCUTAneous Nightly    cefTRIAXone (ROCEPHIN) IV  1,000 mg IntraVENous Q24H    losartan  100 mg Oral Daily    And    hydroCHLOROthiazide  25 mg Oral Daily          PHYSICAL EXAM:    Vitals:    03/02/22 0741   BP: 102/65   Pulse: 95   Resp:    Temp:    SpO2: 94%     CONSTITUTIONAL:  awake, alert, cooperative, no apparent distress  LUNGS:  Easy, nonlabored on 5L NC  CARDIOVASCULAR: regular rate and rhythm  ABDOMEN:  soft, non-distended and tender to palpation along midline incision. incision C/D/I  R UE: 2+ radial, 5/5 hand  strength  L UE: upper arm incision well approximated, 2+ radial/ulnar. 5/5 hand  strength, sensation intact  R LE: foot, warm, well perfused. Multiphasic DP/PT  L LE: foot warm, well perfused.   Multiphasic DP/PT    LABS:    Lab Results   Component Value Date    WBC 12.5 (H) 03/02/2022    HGB 10.0 (L) 03/02/2022    HCT 33.4 (L) 03/02/2022     (H) 03/02/2022    PROTIME 17.4 (H) 02/28/2022    INR 1.6 02/28/2022    APTT 35.8 (H) 02/28/2022    K 3.7 02/28/2022    BUN 29 (H) 02/28/2022    CREATININE 0.9 02/28/2022       RADIOLOGY:

## 2022-03-02 NOTE — PROGRESS NOTES
[address]          Date:3/2/2022                                                   Patient Name: Hector Mancera     MRN: [id number]     : 1940     Room: [address] [place], [place] [address]       Evaluating OT: Jackie Singer OTD, OTR/L, [id number]      Referring Provider: Marino Sood MD    Specific Provider Orders/Date: 3/1/22    Diagnosis: Back pain [M54.9]  Urinary tract infection with hematuria, site unspecified [N39.0, R31.9]  Other pulmonary embolism without acute cor pulmonale, unspecified chronicity ([facility] [address] [I26.99]  Acute back pain, unspecified back location, unspecified back pain laterality [M54.9]    Surgery:   :   VENA CAVA FILTER INSERTION   EXPLORATORY LAPAROTOMY, MESENTERIC ARTERY EMBOLECTOMY   LEFT ARM  EMBOLECTOMY      Patient readmitted following d/c from [address] [place] [address] s/p  procedure with back pain and UTI. Pertinent Medical History:       has a past medical history of Anxiety, Arthritis, Depression, Diverticulitis, Dupuytren's contracture, Hyperlipidemia, Hypertension, Hypothyroidism, Lymphedema, and Pre-diabetes. has a past surgical history that includes Hysterectomy; Vena Cava Filter Placement (Right, 2022); Abdominal aortic aneurysm repair (N/A, 2022); and embolectomy (Left, 2022).       Precautions:  Fall Risk, L side weakness, abdominal splinting for comfort     Assessment of current deficits    [x] Functional mobility  [x]ADLs  [x] Strength               [x]Cognition    [x] Functional transfers   [x] IADLs         [x] Safety Awareness   [x]Endurance    [] Fine Coordination              [x] Balance      [] Vision/perception   []Sensation     []Gross Motor Coordination  [] ROM  [] Delirium                   [] Motor Control     OT PLAN OF CARE   OT POC based on physician orders, patient diagnosis and results of clinical assessment    Frequency/Duration 2-5 days/wk for 2 weeks PRN   Specific OT Treatment Interventions to include:   * Instruction/training on adapted ADL techniques and AE recommendations to increase functional independence within precautions       * Training on energy conservation strategies, correct breathing pattern and techniques to improve independence/tolerance for self-care routine  * Functional transfer/mobility training/DME recommendations for increased independence, safety, and fall prevention  * Patient/Family education to increase follow through with safety techniques and functional independence  * Recommendation of environmental modifications for increased safety with functional transfers/mobility and ADLs  * Splinting/positioning for increased function, prevention of contractures, and improve skin integrity  * Therapeutic exercise to improve motor endurance, ROM, and functional strength for ADLs/functional transfers  * Therapeutic activities to facilitate/challenge dynamic balance, stand tolerance for increased safety and independence with ADLs  * Therapeutic activities to facilitate gross/fine motor skills for increased independence with ADLs  * Neuro-muscular re-education: facilitation of righting/equilibrium reactions, midline orientation, scapular stability/mobility, normalization of muscle tone, and facilitation of volitional active controled movement      Recommended Adaptive Equipment/DME: LB AE, TBD     Home Living: Prior to brief PAZ stay patient lived alone in two story home with 3 steps to enter, 9 steps to second floor, and bed and half bath on main floor with tub/shower on second floor and walk-in shower in the basement with shower seat and standard commode.    Bathroom setup: tub/shower on 2nd floor   Equipment owned: shower chair, rollator    Prior Level of Function: Ind with ADLs , Ind with IADLs; ambulated with rollator PTA  Driving: Yes  Occupation: None stated, enjoys cooking    Family Assistance: Unknown family involvement    Pain Level: 0/10; Additional Comments: None    Cognition: A&O: 4/4; Follows 2 step directions   Memory: G    Sequencing: G   Problem solving: F+   Judgement/safety: G-    Vitals Assessed:  SpO2: 95%, 109HR at rest, 93%, 113 following activity. Functional Assessment:  AM-PAC Daily Activity Raw Score: 15/24   Initial Eval Status  Date: 3/2/22 Treatment Status  Date: STGs = LTGs  Time frame: 10-14 days   Feeding Independent   Independent    Grooming Minimal Assist   For thoroughness for hair care in seated position. Patient standing with good tolerance to activity and no SOB noted. Stand by Assist   While standing with FWW   UB Dressing Moderate Assist   While seated at EOB to don robe with assist around back and to thread arms. Minimal Assist  For object retrieval and donning/doffing   LB Dressing Moderate Assist   To don pants in standing  Max A to don/doff socks while seated at EOB. Minimal Assist   With use of LB AE PRN   Bathing Moderate Assist  D/t limited reach to lower body  Minimal Assist   With use of LB AE PRN   Toileting Moderate Assist   D/t limited reach to backside for hygiene and limited dynamic balance for functional activities. Minimal Assist    Bed Mobility  Supine to sit: Moderate Assist   Sit to supine: NT  Supine to sit: Stand by Assist   Sit to supine: Stand by Assist    Functional Transfers Moderate Assist with FWW  For sit<>stand and SPT from EOB<>chair  Stand by Assist    Functional Mobility Minimal Assist with FWW  For short household distances   Stand by Assist    Balance Sitting:     Static: Sup    Dynamic: Min A   Standing: Min A    during functional activity     Activity Tolerance Fair  No SOB or LOB noted during session. spO2 maintained throughout session.   WFL   Visual/  Perceptual Glasses: No  S/p cataract surgery        Safety G-  G     Hand Dominance: Right   AROM (PROM) Strength Additional Info:    RUE  WFL 4/5 good  and wfl FMC/dexterity noted during ADL tasks       LUE WFL 4-/5 good  and wfl FMC/dexterity noted during ADL tasks       Hearing: WFL   Sensation:  No c/o numbness or tingling   Tone: WFL   Edema: None noted    Comments: Patient cleared by nursing. Upon arrival patient supine in bed, educated on the role of OT, and agreeable to OT session. No family present for session today. OT facilitated ADLs, bed mobility, functional transfers with focus on safety, body mechanics, and abdominal precautions for comfort with patient demonstrating good understanding and mechanics. At end of session, patient seated in armchair , properly positioned, oriented to call light, with call light to L side and phone within reach. Nursing notified. Overall patient demonstrated good reception to skilled services and education with decreased independence and safety during completion of ADL/functional transfer/mobility tasks. Pt would benefit from continued skilled OT to increase safety and independence with completion of ADL/IADL tasks for functional independence and quality of life. Treatment: OT treatment provided this date includes:    Instruction/training on safety and adapted techniques for completion of ADLs: to increase independence in self-care.   Instruction/training on safe functional mobility/transfer techniques:  with focus on safety, body mechanics, and abdominal precautions    Neuromuscular Reeducation to facilitate balance/righting reactions for increased function with ADLs tasks: to improve safety during ADL tasks.  Skilled Monitoring of Vitals: to include spO2, and HR throughout session to maximize safety.  Sitting/Standing Balance/Tolerance: to increase activity tolerance during ADLs and facilitate proper posture and positioning.  Therapeutic activity: to challenge dynamic sitting/standing balance and endurance to promote safety during ADL tasks and functional transfers and mobility.       Rehab Potential: Good for established goals     Patient / Family Goal: To return to [facility] for additional rehab      Patient and/or family were instructed on functional diagnosis, prognosis/goals and OT plan of care. Demonstrated good understanding. Eval Complexity: Low    Time In: 9:58 AM  Time Out: 10:21 AM  Total Treatment Time: 8 minutes    Min Units   OT Eval Low 97165  x  1   OT Eval Medium 79220      OT Eval High 62248      OT Re-Eval [id number]       Therapeutic Ex 83003       Therapeutic Activities 17089       ADL/Self Care 85694  8  1   Orthotic Management 37282       Manual 13662     Neuro Re-Ed 06002       Non-Billable Time          Evaluation Time additionally includes thorough review of current medical information, gathering information on past medical history/social history and prior level of function, interpretation of standardized testing/informal observation of tasks, assessment of data and development of plan of care and goals.             TOMAS Stone,  OTR/L; [id number]

## 2022-03-02 NOTE — PROGRESS NOTES
Physical Therapy  Physical Therapy Initial Assessment     Name: Agusto Maravilla  : 1940  MRN: [id number]      Date of Service: 3/2/2022    Evaluating PT:  Artem Chavez PT, DPT [id number]     Room #:  6420/6218-D  Diagnosis:  Back pain [M54.9]  Urinary tract infection with hematuria, site unspecified [N39.0, R31.9]  Other pulmonary embolism without acute cor pulmonale, unspecified chronicity ([facility] [address] [I26.99]  Acute back pain, unspecified back location, unspecified back pain laterality [M54.9]  Reason for admission: back pain and nausea, DCd to nursing home for only 5 hours   Precautions:  Falls, O2  Procedure/Surgery:  None this admission; on  embolectomy of mesenteric artery and LUE, and IVC placement  Equipment Recommendations:  FWW    SUBJECTIVE:  Pt lives alone in a 2 story home with 3 BAUDILIO no rails and first floor setup. Pt ambulated with FWW when having back pain PTA. OBJECTIVE:   Initial Evaluation  Date: 3/2 Treatment   Short Term/ Long Term   Goals   AM-PAC 6 Clicks 97/39     Was pt agreeable to Eval/treatment? Yes      Does pt have pain?  No      Bed Mobility  Rolling: NT  Supine to sit: ModA  Sit to supine: NT  Scooting: ModA  Independent    Transfers Sit to stand: ModA  Stand to sit: ModA  Stand pivot: [employer]  Independent    Ambulation    40 feet with [employer]    >150 feet with [employer] Mod I    Stair negotiation: ascended and descended  NT  3 steps with 1 rail Mod I      LE ROM: WFL    LE Strength:   knee ext: 4/5  ankle DF: 4/5    Balance:   Sitting static: Supervision  Sitting dynamic: SBA  Standing static: SBA   Standing dynamic: [employer]      -Pt is A & O x 3  -Sensation:  Pt denies numbness and tingling to extremities  -Edema:  unremarkable     Therapeutic Exercises:  Functional activity     Patient education  Pt educated on safety, sequencing of transfers, and role of PT    Patient response to education:   Pt verbalized understanding Pt demonstrated skill Pt requires further education in this area   Yes  Yes  Yes      ASSESSMENT:  Conditions Requiring Skilled Therapeutic Intervention:  [x]Decreased strength     []Decreased ROM  [x]Decreased functional mobility  [x]Decreased balance   [x]Decreased endurance   []Decreased posture  []Decreased sensation  []Decreased coordination   []Decreased vision  [x]Decreased safety awareness   []Increased pain       Comments:  Pt received supine and agreeable to PT session   She presents with general weakness and quick onset of fatigue needing assist for all mobility. She shows some SOB with activity but maintains 93-95% SpO2. Her ambulation is slow and labored with mild unsteadiness. Sitting in chair to end session   Pt with all needs met and call light in reach. Pt would benefit from continued PT POC to address functional deficits described above. Treatment:  Patient practiced and was instructed in the following treatment:     Therapeutic activity  o Patient education provided continuously throughout session for sequencing, safety maintenance, and improving any deficits found during the evaluation. o Bed mobility training - pt given verbal and tactile cues to facilitate proper sequencing and safety during rolling and supine>sit as well as provided with physical assistance to complete task    o STS and pivot transfer training - pt educated on proper hand and foot placement, safety and sequencing, and use of proper technique to safely complete sit<>stand and pivot transfers with hands on assistance to complete task safely   o Gait training- pt was given verbal and tactile cues to facilitate normal speed and balance during ambulation as well as provided with physical assistance. Pt's/ family goals   1. Return to rehab    Patient and or family understand(s) diagnosis, prognosis, and plan of care. yes    Prognosis is good for reaching above PT goals.     PHYSICAL THERAPY PLAN OF CARE:    PT POC is established based on physician order and patient diagnosis Referring provider/PT Order:  03/01/22 1100   PT eval and treat Start: 03/01/22 1100, End: 03/01/22 1100, ONE TIME, Standing Count: 1 Occurrences, R    Amy العلي MD    Diagnosis:  Back pain [M54.9]  Urinary tract infection with hematuria, site unspecified [N39.0, R31.9]  Other pulmonary embolism without acute cor pulmonale, unspecified chronicity ([address] [I26.99]  Acute back pain, unspecified back location, unspecified back pain laterality [M54.9]  Specific instructions for next treatment:  Progress ambulation as able. Current Treatment Recommendations:   [] Strengthening to improve independence with functional mobility   [] ROM to improve independence with functional mobility   [x] Balance Training to improve static/dynamic balance and to reduce fall risk  [x] Endurance Training to improve activity tolerance during functional mobility   [x] Transfer Training to improve safety and independence with all functional transfers   [x] Gait Training to improve gait mechanics, endurance and asses need for appropriate assistive device  [] Stair Training in preparation for safe discharge home and/or into the community   [x] Positioning to prevent skin breakdown and contractures  [x] Safety and Education Training   [] Patient/Caregiver Education   [] HEP  [] Other     PT long term treatment goals are located in above grid    Frequency of treatments: 2-5x/week x 1-2 weeks. Time in  1000  Time out  1020    Total Treatment Time  10 minutes     Evaluation Time includes thorough review of current medical information, gathering information on past medical history/social history and prior level of function, completion of standardized testing/informal observation of tasks, assessment of data and education on plan of care and goals.     CPT codes:  [x] Low Complexity PT evaluation 67133  [] Moderate Complexity PT evaluation 79259  [] High Complexity PT evaluation 56212  [] PT Re-evaluation [id number]  [] Gait training 06423 - minutes  [] Manual therapy 59562 - minutes  [x] Therapeutic activities 46303 10 minutes  [] Therapeutic exercises 64741 - minutes  [] Neuromuscular reeducation 40305 - minutes     Azar Tony PT, DPT  [id number]

## 2022-03-03 LAB
ALBUMIN SERPL-MCNC: 2.6 G/DL (ref 3.5–5.2)
ALP BLD-CCNC: 106 U/L (ref 35–104)
ALT SERPL-CCNC: 38 U/L (ref 0–32)
ANION GAP SERPL CALCULATED.3IONS-SCNC: 11 MMOL/L (ref 7–16)
AST SERPL-CCNC: 30 U/L (ref 0–31)
BASOPHILS ABSOLUTE: 0.07 E9/L (ref 0–0.2)
BASOPHILS RELATIVE PERCENT: 0.6 % (ref 0–2)
BILIRUB SERPL-MCNC: 0.4 MG/DL (ref 0–1.2)
BUN BLDV-MCNC: 28 MG/DL (ref 6–23)
CALCIUM SERPL-MCNC: 9.4 MG/DL (ref 8.6–10.2)
CHLORIDE BLD-SCNC: 97 MMOL/L (ref 98–107)
CO2: 28 MMOL/L (ref 22–29)
CREAT SERPL-MCNC: 0.8 MG/DL (ref 0.5–1)
EOSINOPHILS ABSOLUTE: 0.3 E9/L (ref 0.05–0.5)
EOSINOPHILS RELATIVE PERCENT: 2.7 % (ref 0–6)
GFR AFRICAN AMERICAN: >60
GFR NON-AFRICAN AMERICAN: >60 ML/MIN/1.73
GLUCOSE BLD-MCNC: 98 MG/DL (ref 74–99)
HCT VFR BLD CALC: 32.4 % (ref 34–48)
HEMOGLOBIN: 9.5 G/DL (ref 11.5–15.5)
IMMATURE GRANULOCYTES #: 0.16 E9/L
IMMATURE GRANULOCYTES %: 1.5 % (ref 0–5)
LYMPHOCYTES ABSOLUTE: 1.96 E9/L (ref 1.5–4)
LYMPHOCYTES RELATIVE PERCENT: 17.8 % (ref 20–42)
MCH RBC QN AUTO: 24.5 PG (ref 26–35)
MCHC RBC AUTO-ENTMCNC: 29.3 % (ref 32–34.5)
MCV RBC AUTO: 83.5 FL (ref 80–99.9)
METER GLUCOSE: 115 MG/DL (ref 74–99)
METER GLUCOSE: 122 MG/DL (ref 74–99)
METER GLUCOSE: 131 MG/DL (ref 74–99)
METER GLUCOSE: 136 MG/DL (ref 74–99)
MONOCYTES ABSOLUTE: 0.79 E9/L (ref 0.1–0.95)
MONOCYTES RELATIVE PERCENT: 7.2 % (ref 2–12)
NEUTROPHILS ABSOLUTE: 7.74 E9/L (ref 1.8–7.3)
NEUTROPHILS RELATIVE PERCENT: 70.2 % (ref 43–80)
PDW BLD-RTO: 15.1 FL (ref 11.5–15)
PLATELET # BLD: 430 E9/L (ref 130–450)
PMV BLD AUTO: 9.1 FL (ref 7–12)
POTASSIUM SERPL-SCNC: 4.8 MMOL/L (ref 3.5–5)
RBC # BLD: 3.88 E12/L (ref 3.5–5.5)
SODIUM BLD-SCNC: 136 MMOL/L (ref 132–146)
TOTAL PROTEIN: 6.1 G/DL (ref 6.4–8.3)
URINE CULTURE, ROUTINE: NORMAL
WBC # BLD: 11 E9/L (ref 4.5–11.5)

## 2022-03-03 PROCEDURE — 6370000000 HC RX 637 (ALT 250 FOR IP): Performed by: INTERNAL MEDICINE

## 2022-03-03 PROCEDURE — 97535 SELF CARE MNGMENT TRAINING: CPT

## 2022-03-03 PROCEDURE — 97530 THERAPEUTIC ACTIVITIES: CPT

## 2022-03-03 PROCEDURE — 6360000002 HC RX W HCPCS: Performed by: INTERNAL MEDICINE

## 2022-03-03 PROCEDURE — 80053 COMPREHEN METABOLIC PANEL: CPT

## 2022-03-03 PROCEDURE — 2700000000 HC OXYGEN THERAPY PER DAY

## 2022-03-03 PROCEDURE — 36415 COLL VENOUS BLD VENIPUNCTURE: CPT

## 2022-03-03 PROCEDURE — 1200000000 HC SEMI PRIVATE

## 2022-03-03 PROCEDURE — 82962 GLUCOSE BLOOD TEST: CPT

## 2022-03-03 PROCEDURE — 85025 COMPLETE CBC W/AUTO DIFF WBC: CPT

## 2022-03-03 PROCEDURE — 2580000003 HC RX 258: Performed by: INTERNAL MEDICINE

## 2022-03-03 RX ORDER — CEFDINIR 300 MG/1
300 CAPSULE ORAL EVERY 12 HOURS SCHEDULED
Status: DISCONTINUED | OUTPATIENT
Start: 2022-03-03 | End: 2022-03-05 | Stop reason: HOSPADM

## 2022-03-03 RX ADMIN — POLYETHYLENE GLYCOL 3350 17 G: 17 POWDER, FOR SOLUTION ORAL at 08:55

## 2022-03-03 RX ADMIN — METFORMIN HYDROCHLORIDE 500 MG: 500 TABLET ORAL at 17:37

## 2022-03-03 RX ADMIN — Medication 500 MG: at 08:55

## 2022-03-03 RX ADMIN — FUROSEMIDE 20 MG: 20 TABLET ORAL at 08:55

## 2022-03-03 RX ADMIN — ATORVASTATIN CALCIUM 10 MG: 10 TABLET, FILM COATED ORAL at 08:59

## 2022-03-03 RX ADMIN — WATER 1000 MG: 1 INJECTION INTRAMUSCULAR; INTRAVENOUS; SUBCUTANEOUS at 08:52

## 2022-03-03 RX ADMIN — GABAPENTIN 300 MG: 300 CAPSULE ORAL at 22:08

## 2022-03-03 RX ADMIN — GABAPENTIN 300 MG: 300 CAPSULE ORAL at 08:54

## 2022-03-03 RX ADMIN — CEFDINIR 300 MG: 300 CAPSULE ORAL at 22:08

## 2022-03-03 RX ADMIN — DULOXETINE HYDROCHLORIDE 20 MG: 20 CAPSULE, DELAYED RELEASE ORAL at 08:53

## 2022-03-03 RX ADMIN — GABAPENTIN 300 MG: 300 CAPSULE ORAL at 13:53

## 2022-03-03 RX ADMIN — ONDANSETRON 4 MG: 2 INJECTION INTRAMUSCULAR; INTRAVENOUS at 08:59

## 2022-03-03 RX ADMIN — METFORMIN HYDROCHLORIDE 500 MG: 500 TABLET ORAL at 08:55

## 2022-03-03 RX ADMIN — HYDROCHLOROTHIAZIDE 25 MG: 25 TABLET ORAL at 08:55

## 2022-03-03 RX ADMIN — Medication 1 TABLET: at 08:54

## 2022-03-03 RX ADMIN — ACETAMINOPHEN 500 MG: 500 TABLET ORAL at 13:55

## 2022-03-03 RX ADMIN — APIXABAN 5 MG: 5 TABLET, FILM COATED ORAL at 08:55

## 2022-03-03 RX ADMIN — LOSARTAN POTASSIUM 100 MG: 50 TABLET, FILM COATED ORAL at 08:54

## 2022-03-03 RX ADMIN — POTASSIUM CHLORIDE 20 MEQ: 1500 TABLET, EXTENDED RELEASE ORAL at 08:55

## 2022-03-03 RX ADMIN — APIXABAN 5 MG: 5 TABLET, FILM COATED ORAL at 22:08

## 2022-03-03 ASSESSMENT — PAIN DESCRIPTION - PROGRESSION: CLINICAL_PROGRESSION: GRADUALLY IMPROVING

## 2022-03-03 ASSESSMENT — PAIN SCALES - GENERAL
PAINLEVEL_OUTOF10: 3
PAINLEVEL_OUTOF10: 0
PAINLEVEL_OUTOF10: 3
PAINLEVEL_OUTOF10: 3
PAINLEVEL_OUTOF10: 0

## 2022-03-03 ASSESSMENT — PAIN DESCRIPTION - PAIN TYPE: TYPE: ACUTE PAIN

## 2022-03-03 ASSESSMENT — PAIN DESCRIPTION - LOCATION: LOCATION: BACK

## 2022-03-03 ASSESSMENT — PAIN DESCRIPTION - DESCRIPTORS: DESCRIPTORS: ACHING;DISCOMFORT

## 2022-03-03 ASSESSMENT — PAIN DESCRIPTION - ORIENTATION: ORIENTATION: LOWER

## 2022-03-03 ASSESSMENT — PAIN - FUNCTIONAL ASSESSMENT: PAIN_FUNCTIONAL_ASSESSMENT: PREVENTS OR INTERFERES SOME ACTIVE ACTIVITIES AND ADLS

## 2022-03-03 ASSESSMENT — PAIN DESCRIPTION - ONSET: ONSET: ON-GOING

## 2022-03-03 NOTE — CARE COORDINATION
3/3/22 Update CM note; patient is now on general medical floor. She worked with PT 14/24 and OT 15/24. She is on iv rocephin qd, eliquis bid. Per vascular will continue to monitor. Plan is to return to [facility] at discharge. Contact made with Atilio Matos at [employer]. Precert will be required and Atilio Matos notified to start precert for possible return tomorrow.  Electronically signed by Marcelina Mcmillan RN CM on 3/3/2022 at 12:20 PM

## 2022-03-03 NOTE — PROGRESS NOTES
Vascular Surgery Progress Note    CC:  left subclavian artery occlusion, submassive pulmonary emboli and SMA embolus    Subjective: Tolerated diet. No abdominal pain.    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    Limb ischemia I99.8    Multiple pulmonary emboli (HCC) I26.99    Back pain M54.9       Current Medications:    dextrose        glucose, dextrose, glucagon (rDNA), dextrose, ondansetron, acetaminophen    atorvastatin  10 mg Oral Daily    calcium elemental  500 mg Oral Daily    DULoxetine  20 mg Oral Every Other Day    furosemide  20 mg Oral Daily    gabapentin  300 mg Oral TID    metFORMIN  500 mg Oral BID WC    polyethylene glycol  17 g Oral Daily    therapeutic multivitamin-minerals  1 tablet Oral Daily    potassium chloride  20 mEq Oral Daily    apixaban  5 mg Oral BID    insulin lispro  0-12 Units SubCUTAneous TID WC    insulin lispro  0-6 Units SubCUTAneous Nightly    cefTRIAXone (ROCEPHIN) IV  1,000 mg IntraVENous Q24H    losartan  100 mg Oral Daily    And    hydroCHLOROthiazide  25 mg Oral Daily          PHYSICAL EXAM:    Vitals:    03/03/22 1104   BP: 120/62   Pulse: 76   Resp:    Temp:    SpO2: 96%     CONSTITUTIONAL:  awake, alert, cooperative, no apparent distress  LUNGS:  Easy, nonlabored on 5L NC  CARDIOVASCULAR: regular rate and rhythm  ABDOMEN:  soft, non-distended and tender to palpation along midline incision. incision C/D/I  R UE: 2+ radial, 5/5 hand  strength  L UE: upper arm incision well approximated, 2+ radial/ulnar. 5/5 hand  strength, sensation intact  R LE: foot, warm, well perfused. Multiphasic DP/PT  L LE: foot warm, well perfused.   Multiphasic DP/PT    LABS:    Lab Results   Component Value Date    WBC 11.0 03/03/2022    HGB 9.5 (L) 03/03/2022    HCT 32.4 (L) 03/03/2022     03/03/2022    PROTIME 17.4 (H) 02/28/2022    INR 1.6 02/28/2022    APTT 35.8 (H) 02/28/2022    K 4.8 03/03/2022    BUN 28 (H) 03/03/2022    CREATININE 0.8 03/03/2022       RADIOLOGY:

## 2022-03-03 NOTE — CARE COORDINATION
3/3/22 Update CM note: MARY/MARYBETH met patient and her children at the bedside regarding plans for discharge back to [facility]. They are concerned about the medication and the instructions being sent appropriately. They would like a nausea medication on the discharge instructions also. They are requesting an air mattress be on the bed at the facility. Willie Bustillo from [address] [place] [address] notified of above.  She will relay information for patient before arrival. Electronically signed by Rocco Polo RN on 3/3/2022 at 3:06 PM

## 2022-03-03 NOTE — PROGRESS NOTES
Occupational Therapy  OT BEDSIDE TREATMENT NOTE   [facility] ([place] [address]  [facility], [address] [place] [address]  Patient Name: Gely Grant  MRN: [id number]  : 1940  Room: [address] [place] [address]A     Per OT Eval:    Evaluating OT: Iraida Cowan OTD, OTR/L, [id number]       Referring Provider: Darren Collins MD    Specific Provider Orders/Date: 3/1/22    Diagnosis: Back pain [M54.9]  Urinary tract infection with hematuria, site unspecified [N39.0, R31.9]  Other pulmonary embolism without acute cor pulmonale, unspecified chronicity ([address] [I26.99]  Acute back pain, unspecified back location, unspecified back pain laterality [M54.9]    Surgery:              :              VENA CAVA FILTER INSERTION              EXPLORATORY LAPAROTOMY, MESENTERIC ARTERY EMBOLECTOMY              LEFT  [facility]       Patient readmitted following d/c from [facility] s/p  procedure with back pain and UTI.        Pertinent Medical History:       has a past medical history of Anxiety, Arthritis, Depression, Diverticulitis, Dupuytren's contracture, Hyperlipidemia, Hypertension, Hypothyroidism, Lymphedema, and Pre-diabetes.         has a past surgical history that includes Hysterectomy; Vena Cava Filter Placement (Right, 2022); Abdominal aortic aneurysm repair (N/A, 2022); and embolectomy (Left, 2022).      Precautions:  Fall Risk, L side weakness, abdominal splinting for comfort      Assessment of current deficits    [x]? Functional mobility            [x]?ADLs           [x]? Strength                   [x]? Cognition    [x]? Functional transfers          [x]? IADLs         [x]? Safety Awareness   [x]? Endurance    []? Fine Coordination              [x]? Balance      []? Vision/perception    []? Sensation      []? Gross Motor Coordination  []? ROM           []?  Delirium                   []? Motor Control      OT PLAN OF CARE   OT POC based on physician orders, patient diagnosis and results of clinical assessment     Frequency/Duration 2-5 days/wk for 2 weeks PRN   Specific OT Treatment Interventions to include:   * Instruction/training on adapted ADL techniques and AE recommendations to increase functional independence within precautions       * Training on energy conservation strategies, correct breathing pattern and techniques to improve independence/tolerance for self-care routine  * Functional transfer/mobility training/DME recommendations for increased independence, safety, and fall prevention  * Patient/Family education to increase follow through with safety techniques and functional independence  * Recommendation of environmental modifications for increased safety with functional transfers/mobility and ADLs  * Splinting/positioning for increased function, prevention of contractures, and improve skin integrity  * Therapeutic exercise to improve motor endurance, ROM, and functional strength for ADLs/functional transfers  * Therapeutic activities to facilitate/challenge dynamic balance, stand tolerance for increased safety and independence with ADLs  * Therapeutic activities to facilitate gross/fine motor skills for increased independence with ADLs  * Neuro-muscular re-education: facilitation of righting/equilibrium reactions, midline orientation, scapular stability/mobility, normalization of muscle tone, and facilitation of volitional active controled movement        Recommended Adaptive Equipment/DME: LB AE, TBD      Home Living: Prior to brief [facility] stay patient lived alone in two story home with 3 steps to enter, 9 steps to second floor, and bed and half bath on main floor with tub/shower on second floor and walk-in shower in the basement with shower seat and standard commode.    Bathroom setup: tub/shower on 2nd floor   Equipment owned: shower chair, rollator     Prior Level of Function: Ind with ADLs , Ind with IADLs; ambulated with rollator PTA  Driving: DNT  Pt seated EOB upon arrival and at end of session    modA per previous level  Supine to sit: Stand by Assist   Sit to supine: Stand by Assist    Functional Transfers Moderate Assist with FWW  For sit<>stand and SPT from EOB<>chair modA  Sit to Stand  Stand to Sit    Cueing for hand placement  Stand by Assist    Functional Mobility Minimal Assist with FWW  For short household distances  Veronica  Pt ambulated short household distance in room, and within ruiz with w.w.  Stand by Assist    Balance Sitting:     Static: Sup    Dynamic: Min A   Standing: Min A    during functional activity Sitting EOB:  SBA    Standing:  Veronica      Activity Tolerance Fair  No SOB or LOB noted during session. spO2 maintained throughout session.  Fair- WFL   Visual/  Perceptual Glasses: No  S/p cataract surgery          Safety G-   G      Hand Dominance: Right    AROM (PROM) Strength Additional Info:    RUE  WFL 4/5 good  and wfl FMC/dexterity noted during ADL tasks         LUE WFL 4-/5 good  and wfl FMC/dexterity noted during ADL tasks         Hearing: WFL   Sensation:  No c/o numbness or tingling   Tone: WFL   Edema: None noted        Education:  Pt was educated on role of OT, goals to be reached, importance of OOB activity, safety and hand placement with transfers, safety and walker management with functional mobility, compensatory strategies to assist with ADL tasks, and energy conservation techniques. Comments: Upon arrival pt seated EOB, agreeable to therapy, daughter present. Pt completed of transfers, functional mobility and ADL tasks this session. Spoke with daughter in regards to pt's current assist levels and use of DME to assist with ADL tasks, answering of all questions. At end of session, pt seated upright in chair after encouragement, all lines and tubes intact, call light within reach. · Pt has made fair progress towards set goals.    · Continue with current plan of care focusing on increasing of independency with transfers and ADL tasks      Treatment Time In: 12:45pm          Treatment Time Out: 1:10pm                Treatment Charges: Mins Units   Ther Ex  18302     Manual Therapy 05420     Thera Activities 20823 15 1   ADL/Home Mgt 31032 10 1   Neuro Re-ed 19730     Group Therapy      Orthotic manage/training  01802     Non-Billable Time     Total Timed Treatment 25 2        Abbie BHATT/ART [id number]

## 2022-03-04 VITALS
HEIGHT: 62 IN | OXYGEN SATURATION: 95 % | SYSTOLIC BLOOD PRESSURE: 115 MMHG | WEIGHT: 194 LBS | DIASTOLIC BLOOD PRESSURE: 57 MMHG | RESPIRATION RATE: 18 BRPM | HEART RATE: 90 BPM | BODY MASS INDEX: 35.7 KG/M2 | TEMPERATURE: 98.9 F

## 2022-03-04 LAB
ALBUMIN SERPL-MCNC: 3 G/DL (ref 3.5–5.2)
ALP BLD-CCNC: 101 U/L (ref 35–104)
ALT SERPL-CCNC: 59 U/L (ref 0–32)
ANION GAP SERPL CALCULATED.3IONS-SCNC: 10 MMOL/L (ref 7–16)
AST SERPL-CCNC: 42 U/L (ref 0–31)
BASOPHILS ABSOLUTE: 0.08 E9/L (ref 0–0.2)
BASOPHILS RELATIVE PERCENT: 0.8 % (ref 0–2)
BILIRUB SERPL-MCNC: 0.3 MG/DL (ref 0–1.2)
BUN BLDV-MCNC: 30 MG/DL (ref 6–23)
CALCIUM SERPL-MCNC: 9.5 MG/DL (ref 8.6–10.2)
CHLORIDE BLD-SCNC: 96 MMOL/L (ref 98–107)
CO2: 31 MMOL/L (ref 22–29)
CREAT SERPL-MCNC: 0.8 MG/DL (ref 0.5–1)
EOSINOPHILS ABSOLUTE: 0.32 E9/L (ref 0.05–0.5)
EOSINOPHILS RELATIVE PERCENT: 3.3 % (ref 0–6)
GFR AFRICAN AMERICAN: >60
GFR NON-AFRICAN AMERICAN: >60 ML/MIN/1.73
GLUCOSE BLD-MCNC: 116 MG/DL (ref 74–99)
HCT VFR BLD CALC: 32.9 % (ref 34–48)
HEMOGLOBIN: 9.6 G/DL (ref 11.5–15.5)
IMMATURE GRANULOCYTES #: 0.13 E9/L
IMMATURE GRANULOCYTES %: 1.3 % (ref 0–5)
LYMPHOCYTES ABSOLUTE: 2.07 E9/L (ref 1.5–4)
LYMPHOCYTES RELATIVE PERCENT: 21.1 % (ref 20–42)
MCH RBC QN AUTO: 24.1 PG (ref 26–35)
MCHC RBC AUTO-ENTMCNC: 29.2 % (ref 32–34.5)
MCV RBC AUTO: 82.7 FL (ref 80–99.9)
METER GLUCOSE: 110 MG/DL (ref 74–99)
METER GLUCOSE: 129 MG/DL (ref 74–99)
METER GLUCOSE: 146 MG/DL (ref 74–99)
METER GLUCOSE: 164 MG/DL (ref 74–99)
MONOCYTES ABSOLUTE: 0.65 E9/L (ref 0.1–0.95)
MONOCYTES RELATIVE PERCENT: 6.6 % (ref 2–12)
NEUTROPHILS ABSOLUTE: 6.58 E9/L (ref 1.8–7.3)
NEUTROPHILS RELATIVE PERCENT: 66.9 % (ref 43–80)
PDW BLD-RTO: 14.7 FL (ref 11.5–15)
PLATELET # BLD: 519 E9/L (ref 130–450)
PMV BLD AUTO: 9.6 FL (ref 7–12)
POTASSIUM SERPL-SCNC: 4.1 MMOL/L (ref 3.5–5)
RBC # BLD: 3.98 E12/L (ref 3.5–5.5)
SARS-COV-2, NAAT: NOT DETECTED
SODIUM BLD-SCNC: 137 MMOL/L (ref 132–146)
TOTAL PROTEIN: 6 G/DL (ref 6.4–8.3)
WBC # BLD: 9.8 E9/L (ref 4.5–11.5)

## 2022-03-04 PROCEDURE — 85025 COMPLETE CBC W/AUTO DIFF WBC: CPT

## 2022-03-04 PROCEDURE — 1200000000 HC SEMI PRIVATE

## 2022-03-04 PROCEDURE — 97535 SELF CARE MNGMENT TRAINING: CPT

## 2022-03-04 PROCEDURE — 87635 SARS-COV-2 COVID-19 AMP PRB: CPT

## 2022-03-04 PROCEDURE — 2700000000 HC OXYGEN THERAPY PER DAY

## 2022-03-04 PROCEDURE — 97530 THERAPEUTIC ACTIVITIES: CPT

## 2022-03-04 PROCEDURE — 6370000000 HC RX 637 (ALT 250 FOR IP): Performed by: INTERNAL MEDICINE

## 2022-03-04 PROCEDURE — 80053 COMPREHEN METABOLIC PANEL: CPT

## 2022-03-04 PROCEDURE — 82962 GLUCOSE BLOOD TEST: CPT

## 2022-03-04 PROCEDURE — 36415 COLL VENOUS BLD VENIPUNCTURE: CPT

## 2022-03-04 PROCEDURE — 6360000002 HC RX W HCPCS: Performed by: INTERNAL MEDICINE

## 2022-03-04 RX ORDER — CEFDINIR 300 MG/1
300 CAPSULE ORAL EVERY 12 HOURS SCHEDULED
Qty: 4 CAPSULE | Refills: 0 | DISCHARGE
Start: 2022-03-04 | End: 2022-03-06

## 2022-03-04 RX ADMIN — ONDANSETRON 4 MG: 2 INJECTION INTRAMUSCULAR; INTRAVENOUS at 09:50

## 2022-03-04 RX ADMIN — FUROSEMIDE 20 MG: 20 TABLET ORAL at 09:45

## 2022-03-04 RX ADMIN — POTASSIUM CHLORIDE 20 MEQ: 1500 TABLET, EXTENDED RELEASE ORAL at 09:42

## 2022-03-04 RX ADMIN — Medication 1 TABLET: at 09:43

## 2022-03-04 RX ADMIN — CEFDINIR 300 MG: 300 CAPSULE ORAL at 09:44

## 2022-03-04 RX ADMIN — GABAPENTIN 300 MG: 300 CAPSULE ORAL at 14:34

## 2022-03-04 RX ADMIN — APIXABAN 5 MG: 5 TABLET, FILM COATED ORAL at 21:25

## 2022-03-04 RX ADMIN — GABAPENTIN 300 MG: 300 CAPSULE ORAL at 09:42

## 2022-03-04 RX ADMIN — GABAPENTIN 300 MG: 300 CAPSULE ORAL at 21:25

## 2022-03-04 RX ADMIN — METFORMIN HYDROCHLORIDE 500 MG: 500 TABLET ORAL at 09:43

## 2022-03-04 RX ADMIN — LOSARTAN POTASSIUM 100 MG: 50 TABLET, FILM COATED ORAL at 09:52

## 2022-03-04 RX ADMIN — METFORMIN HYDROCHLORIDE 500 MG: 500 TABLET ORAL at 17:22

## 2022-03-04 RX ADMIN — INSULIN LISPRO 1 UNITS: 100 INJECTION, SOLUTION INTRAVENOUS; SUBCUTANEOUS at 21:25

## 2022-03-04 RX ADMIN — POLYETHYLENE GLYCOL 3350 17 G: 17 POWDER, FOR SOLUTION ORAL at 09:42

## 2022-03-04 RX ADMIN — CEFDINIR 300 MG: 300 CAPSULE ORAL at 21:25

## 2022-03-04 RX ADMIN — APIXABAN 5 MG: 5 TABLET, FILM COATED ORAL at 09:43

## 2022-03-04 RX ADMIN — INSULIN LISPRO 2 UNITS: 100 INJECTION, SOLUTION INTRAVENOUS; SUBCUTANEOUS at 12:11

## 2022-03-04 RX ADMIN — HYDROCHLOROTHIAZIDE 25 MG: 25 TABLET ORAL at 09:42

## 2022-03-04 RX ADMIN — Medication 500 MG: at 09:43

## 2022-03-04 RX ADMIN — ATORVASTATIN CALCIUM 10 MG: 10 TABLET, FILM COATED ORAL at 09:43

## 2022-03-04 ASSESSMENT — PAIN DESCRIPTION - PROGRESSION
CLINICAL_PROGRESSION: GRADUALLY IMPROVING
CLINICAL_PROGRESSION: GRADUALLY IMPROVING

## 2022-03-04 ASSESSMENT — PAIN SCALES - GENERAL
PAINLEVEL_OUTOF10: 0
PAINLEVEL_OUTOF10: 3

## 2022-03-04 NOTE — PROGRESS NOTES
Hong Moya MD FACP  Internal medicine  Progress notes      CHIEF COMPLAINT: Back pain nausea      HISTORY OF PRESENT ILLNESS:    11/2022  78year-old woman seen in the emergency room at main [facility] earlier this morning  Spoke with the ER physician at the time of admission  Spoke with vascular surgery  Daughter was at bedside  Data reviewed in detail  Patient was discharged to subacute rehab yesterday after she underwent IVC filter placement, mesenteric arterial embolectomy, and left subclavian embolectomy  Patient required Riley catheter postoperatively which was removed subsequently  She was at the [facility] for about 5 hours when she developed nausea back pain and suprapubic pain  Urinalysis suggestive of UTI  Abnormal CT of the abdomen was reviewed  Vascular surgery felt it was a small hematoma not to be concerned about  She is being admitted for further management  3/2/2022  Patient was seen on the floor earlier this morning  She states she is feeling much better  Nausea resolved  Oral intake adequate  On 2 L of oxygen nasal cannula  Tolerating medications  3/3/2022  She was seen on the floor earlier this morning  Remains a stable  Oral intake excellent  On minimal amount of oxygen through nasal cannula 1 L/min  Tolerating medications  No further nausea or emesis  Urine culture showing mixed organisms  Currently on Rocephin IV  3/4/2022  Patient was seen on the floor earlier this morning  Daughter at bedside  She remains stable  Tolerating medications  Oral intake adequate    Past Medical History:    Past Medical History:   Diagnosis Date    Anxiety     Arthritis     osteoarthritis    Depression     Diverticulitis     Dupuytren's contracture     Hyperlipidemia     Hypertension     Hypothyroidism     Lymphedema     Pre-diabetes        Past Surgical History:    Past Surgical History:   Procedure Laterality Date    ABDOMINAL AORTIC ANEURYSM REPAIR N/A 2/19/2022    EXPLORATORY LAPAROTOMY, MESENTERIC ARTERY EMBOLECTOMY performed by Jose Hsu MD at [facility] Left 2/19/2022    LEFT ARM  EMBOLECTOMY performed by Jose Hsu MD at [facility] Right 2/19/2022    VENA CAVA FILTER INSERTION performed by Jose Hsu MD at [address] [place]       Medications Prior to Admission:    Medications Prior to Admission: apixaban (ELIQUIS) 5 MG TABS tablet, Take 1 tablet by mouth 2 times daily  gabapentin (NEURONTIN) 300 MG capsule, Take 1 capsule by mouth 3 times daily for 30 days. DULoxetine (CYMBALTA) 20 MG extended release capsule, Take 1 capsule by mouth every other day  polyethylene glycol (GLYCOLAX) 17 g packet, Take 17 g by mouth daily  furosemide (LASIX) 20 MG tablet, Take 1 tablet by mouth daily  potassium chloride (KLOR-CON M) 20 MEQ extended release tablet, Take 1 tablet by mouth daily  metFORMIN (GLUCOPHAGE) 500 MG tablet, Take 1 tablet by mouth 2 times daily (with meals)  losartan-hydrochlorothiazide (HYZAAR) 100-25 MG per tablet, Take 1 tablet by mouth daily  atorvastatin (LIPITOR) 20 MG tablet, Take 1 tablet by mouth daily  Multiple Vitamins-Minerals (THERAPEUTIC MULTIVITAMIN-MINERALS) tablet, Take 1 tablet by mouth daily  calcium carbonate (OSCAL) 500 MG TABS tablet, Take 500 mg by mouth daily Indications: taking 3 timew a week     Allergies:    Patient has no known allergies. Social History:    reports that she has never smoked. She has never used smokeless tobacco. She reports that she does not drink alcohol. Family History:   family history is not on file. REVIEW OF SYSTEMS:  As above in the HPI, otherwise negative    PHYSICAL EXAM:    Vitals:  /70   Pulse 86   Temp 97.4 °F (36.3 °C) (Temporal)   Resp 16   Ht 5' 2\" (1.575 m)   Wt 194 lb (88 kg)   SpO2 95%   BMI 35.48 kg/m²     General:  Awake, alert, oriented X 3. Well developed, well nourished, well groomed.   In mild distress  HEENT: Normocephalic, atraumatic. Pupils equal, round, reactive to light. No scleral icterus. No conjunctival injection. .  No nasal discharge. Neck:  Supple no adenopathy  Heart:  RRR, no murmurs, gallops, rubs  Lungs:  CTA bilaterally, bilat symmetrical expansion, no wheeze, rales, or rhonchi  Abdomen:   Bowel sounds present, soft, suprapubic tenderness noted, no masses, no organomegaly, no peritoneal signs  Abdominal wall incision looks clean and dry with staples intact  Extremities:  No clubbing, cyanosis, or edema  Left upper extremity incision is healing well  Skin:  Warm and dry, no open lesions or rash  Neuro:  Cranial nerves 2-12 intact, no focal deficits  Breast: deferred  Rectal: deferred  Genitalia:  deferred    LABS: Data reviewed in detail      ASSESSMENT:    Acute cystitis, catheterization associated  Recent IVC filter placement for pulmonary emboli  Recent left subclavian arterial embolectomy  Recent mesenteric arterial embolectomy  Thrombophilia disorder  Essential hypertension  Type 2 diabetes mellitus  Patient Active Problem List   Diagnosis    Essential hypertension, benign    Lymphedema    Osteoarthritis    Hyperlipidemia    Anxiety    Acute mesenteric arterial occlusion (HCC)    Acute saddle pulmonary embolism with acute cor pulmonale (HCC)    Occlusion of left subclavian artery    Limb ischemia    Multiple pulmonary emboli (HCC)    Back pain           PLAN:  Discharge back to subacute rehab  Questions answered to daughter's satisfaction    Bertha Breen MD  10:31 AM  3/4/2022

## 2022-03-04 NOTE — PROGRESS NOTES
I called [phone] and I spoke to Tricia Plaza and nurse to nurse nicol .  All paperwork faxed to [phone] per Lilian's request .

## 2022-03-04 NOTE — PROGRESS NOTES
Vascular Surgery Progress Note    CC:  left subclavian artery occlusion, submassive pulmonary emboli and SMA embolus    Subjective:  Tolerated diet, no abdominal pain    IMPRESSION:   s/p VCF insertion, Ex Lap, SMA embolectomy, L UE embolectomy 2/19    PLAN: Continue to monitor bilateral lower extremity and left upper extremity neurovascular exam  Continue to monitor abdominal exam  PT/OT    We will remove staples sometime this afternoon from midline abdomen incision    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    Limb ischemia I99.8    Multiple pulmonary emboli (HCC) I26.99    Back pain M54.9       Current Medications:    dextrose        glucose, dextrose, glucagon (rDNA), dextrose, ondansetron, acetaminophen    cefdinir  300 mg Oral 2 times per day    atorvastatin  10 mg Oral Daily    calcium elemental  500 mg Oral Daily    DULoxetine  20 mg Oral Every Other Day    furosemide  20 mg Oral Daily    gabapentin  300 mg Oral TID    metFORMIN  500 mg Oral BID WC    polyethylene glycol  17 g Oral Daily    therapeutic multivitamin-minerals  1 tablet Oral Daily    potassium chloride  20 mEq Oral Daily    apixaban  5 mg Oral BID    insulin lispro  0-12 Units SubCUTAneous TID WC    insulin lispro  0-6 Units SubCUTAneous Nightly    losartan  100 mg Oral Daily    And    hydroCHLOROthiazide  25 mg Oral Daily          PHYSICAL EXAM:    Vitals:    03/04/22 0100   BP: (!) 100/57   Pulse: 80   Resp: 16   Temp: 97.1 °F (36.2 °C)   SpO2: 97%     CONSTITUTIONAL:  awake, alert, cooperative, no apparent distress  LUNGS:  Easy, nonlabored on 5L NC  CARDIOVASCULAR: regular rate and rhythm  ABDOMEN:  soft, non-distended and tender to palpation along midline incision. incision C/D/I  R UE: 2+ radial, 5/5 hand  strength  L UE: upper arm incision well approximated, 2+ radial/ulnar. 5/5 hand  strength, sensation intact  R LE: foot, warm, well perfused. Multiphasic DP/PT  L LE: foot warm, well perfused.   Multiphasic DP/PT    LABS:    Lab Results   Component Value Date    WBC 11.0 03/03/2022    HGB 9.5 (L) 03/03/2022    HCT 32.4 (L) 03/03/2022     03/03/2022    PROTIME 17.4 (H) 02/28/2022    INR 1.6 02/28/2022    APTT 35.8 (H) 02/28/2022    K 4.8 03/03/2022    BUN 28 (H) 03/03/2022    CREATININE 0.8 03/03/2022       RADIOLOGY:

## 2022-03-04 NOTE — DISCHARGE INSTR - COC
Continuity of Care Form    Patient Name: Hector Mancera   :  1940  MRN:  [id number]    Admit date:  2022  Discharge date:  2022    Code Status Order: Prior   Advance Directives:      Admitting Physician:  Ambika Stroud MD  PCP: Belle Rocha MD    Discharging Nurse: [facility] Unit/Room#: 8473/8557-K  Discharging Unit Phone Number: [phone]    Emergency Contact:   Extended Emergency Contact Information  Primary Emergency Contact: Celso Siddiqui  [place] Phone: [phone]  Relation: Child  Preferred language: English   needed?  No  Secondary Emergency Contact: [address] Phone: [phone]  Mobile Phone: [phone]  Relation: Child    Past Surgical History:  Past Surgical History:   Procedure Laterality Date    ABDOMINAL AORTIC ANEURYSM REPAIR N/A 2022    EXPLORATORY LAPAROTOMY, MESENTERIC ARTERY EMBOLECTOMY performed by Harshal Burkett MD at [address] [place] [address] Left 2022    LEFT ARM  EMBOLECTOMY performed by Harshal Burkett MD at [facility] Right 2022    VENA CAVA FILTER INSERTION performed by Harshal Burkett MD at [address] [place]       Immunization History:   Immunization History   Administered Date(s) Administered    Influenza Vaccine, unspecified formulation 10/26/2016    Influenza Virus Vaccine 10/10/2012    Influenza, High Dose (Fluzone 65 yrs and older) 10/30/2015, 10/26/2016, 2017, 10/31/2018, 2019    Pneumococcal Conjugate 13-valent (Mendy Drain) 2005, 10/30/2015    Pneumococcal Polysaccharide ([address]) 2020    Zoster Live (Zostavax) 2013       Active Problems:  Patient Active Problem List   Diagnosis Code    Essential hypertension, benign I10    Lymphedema I89.0    Osteoarthritis M19.90    Hyperlipidemia E78.5    Anxiety F41.9    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    Acute saddle pulmonary embolism with acute cor pulmonale (HCC) I26.02    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    Limb ischemia I99.8    Multiple pulmonary emboli (HCC) I26.99    Back pain M54.9       Isolation/Infection:   Isolation            No Isolation          Patient Infection Status       None to display            Nurse Assessment:  Last Vital Signs: /70   Pulse 86   Temp 97.4 °F (36.3 °C) (Temporal)   Resp 16   Ht 5' 2\" (1.575 m)   Wt 194 lb (88 kg)   SpO2 95%   BMI 35.48 kg/m²     Last documented pain score (0-10 scale): Pain Level: 3  Last Weight:   Wt Readings from Last 1 Encounters:   02/28/22 194 lb (88 kg)     Mental Status:  oriented, alert, and thought processes intact    IV Access:  - None    Nursing Mobility/ADLs:  Walking   Assisted  Transfer  Assisted  Bathing  Assisted  Dressing  Assisted  Toileting  Assisted  Feeding  Independent  Med Admin  Assisted  Med Delivery   whole    Wound Care Documentation and Therapy:        Elimination:  Continence: Bowel: Yes  Bladder: Yes, No, and at times incontinent  Urinary Catheter: None   Colostomy/Ileostomy/Ileal Conduit: No       Date of Last BM: 03/01/2022    Intake/Output Summary (Last 24 hours) at 3/4/2022 1035  Last data filed at 3/4/2022 0943  Gross per 24 hour   Intake 540 ml   Output --   Net 540 ml     I/O last 3 completed shifts: In: 5 [P.O.:420]  Out: 100 [Urine:100]    Safety Concerns: At Risk for Falls    Impairments/Disabilities:      None    Nutrition Therapy:  Current Nutrition Therapy:   - Oral Diet:  General    Routes of Feeding: Oral  Liquids: No Restrictions  Daily Fluid Restriction: no  Last Modified Barium Swallow with Video (Video Swallowing Test): not done    Treatments at the Time of Hospital Discharge:   Respiratory Treatments: none  Oxygen Therapy:  is not on home oxygen therapy.   Ventilator:    - No ventilator support    Rehab Therapies: Physical Therapy and Occupational Therapy  Weight Bearing Status/Restrictions: No weight bearing restirctions  Other Medical Equipment (for information only, NOT a DME order):  walker and bedside commode  Other Treatments: ***    Patient's personal belongings (please select all that are sent with patient):  None    RN SIGNATURE:  Electronically signed by Juju Levine RN on 3/4/22 at 1:13 PM EST    CASE MANAGEMENT/SOCIAL WORK SECTION    Inpatient Status Date: ***    Readmission Risk Assessment Score:  Readmission Risk              Risk of Unplanned Readmission:  17           Discharging to Facility/ Agency   Name:   Address:  Phone:  Fax:    Dialysis Facility (if applicable)   Name:  Address:  Dialysis Schedule:  Phone:  Fax:    / signature: {Esignature:[id number]}    PHYSICIAN SECTION    Prognosis: {Prognosis:1344086137}    Condition at Discharge: [address] [place] [address] Patient Condition:[id number]}    Rehab Potential (if transferring to Rehab): {Prognosis:0314698201}    Recommended Labs or Other Treatments After Discharge: ***    Physician Certification: I certify the above information and transfer of Radha Irving  is necessary for the continuing treatment of the diagnosis listed and that she requires {Admit to Appropriate Level of Care:06396} for {GREATER/LESS:873317520} 30 days.      Update Admission H&P: {CHP DME Changes in YKJIV:615372992}    PHYSICIAN SIGNATURE:  Gio Blanco MD

## 2022-03-04 NOTE — PLAN OF CARE
Problem: Pain:  Goal: Pain level will decrease  Description: Pain level will decrease  3/4/2022 1849 by Scott Swain RN  Outcome: Completed  3/4/2022 1114 by Dolores Lyons RN  Outcome: Met This Shift  Goal: Control of acute pain  Description: Control of acute pain  3/4/2022 1849 by Scott Swain RN  Outcome: Completed  3/4/2022 1114 by Dolores Lyons RN  Outcome: Met This Shift  Goal: Control of chronic pain  Description: Control of chronic pain  3/4/2022 1849 by Scott Swain RN  Outcome: Completed  3/4/2022 1114 by Dolores Lyons RN  Outcome: Met This Shift     Problem: Skin Integrity:  Goal: Will show no infection signs and symptoms  Description: Will show no infection signs and symptoms  3/4/2022 1849 by Scott Swain RN  Outcome: Completed  3/4/2022 1114 by Dolores Lyons RN  Outcome: Met This Shift  Goal: Absence of new skin breakdown  Description: Absence of new skin breakdown  3/4/2022 1849 by Scott Swain RN  Outcome: Completed  3/4/2022 1114 by Dolores Lyons RN  Outcome: Met This Shift     Problem: Falls - Risk of:  Goal: Will remain free from falls  Description: Will remain free from falls  3/4/2022 1849 by Scott Swain RN  Outcome: Completed  3/4/2022 1114 by Dolores Lyons RN  Outcome: Met This Shift  Goal: Absence of physical injury  Description: Absence of physical injury  3/4/2022 1849 by Scott Swain RN  Outcome: Completed  3/4/2022 1114 by Dolores Lyons RN  Outcome: Met This Shift     Problem: Musculor/Skeletal Functional Status  Goal: Highest potential functional level  3/4/2022 1849 by Scott Swain RN  Outcome: Completed  3/4/2022 1114 by Dolores Lyons RN  Outcome: Ongoing  Goal: Absence of falls  3/4/2022 1849 by Scott Swain RN  Outcome: Completed  3/4/2022 1114 by Dolores Lyons RN  Outcome: Met This Shift

## 2022-03-04 NOTE — PROGRESS NOTES
Occupational Therapy  OT BEDSIDE TREATMENT NOTE   [address] [facility]  Patient Name: Kianna Gallo  MRN: [id number]  : 1940  Room: [address] [place] [address]A     Per OT Eval:    Evaluating OT: Radhames Butts OTD, OTR/L, [id number]       Referring Provider: Hector Urias MD    Specific Provider Orders/Date: 3/1/22    Diagnosis: Back pain [M54.9]  Urinary tract infection with hematuria, site unspecified [N39.0, R31.9]  Other pulmonary embolism without acute cor pulmonale, unspecified chronicity ([facility] [address] [I26.99]  Acute back pain, unspecified back location, unspecified back pain laterality [M54.9]    Surgery:              :              VENA CAVA FILTER INSERTION              EXPLORATORY LAPAROTOMY, MESENTERIC ARTERY EMBOLECTOMY              LEFT  [facility]       Patient readmitted following d/c from [address] [place] [address] s/p  procedure with back pain and UTI.        Pertinent Medical History:       has a past medical history of Anxiety, Arthritis, Depression, Diverticulitis, Dupuytren's contracture, Hyperlipidemia, Hypertension, Hypothyroidism, Lymphedema, and Pre-diabetes.         has a past surgical history that includes Hysterectomy; Vena Cava Filter Placement (Right, 2022); Abdominal aortic aneurysm repair (N/A, 2022); and embolectomy (Left, 2022).      Precautions:  Fall Risk, L side weakness, abdominal splinting for comfort      Assessment of current deficits    [x]? Functional mobility            [x]?ADLs           [x]? Strength                   [x]? Cognition    [x]? Functional transfers          [x]? IADLs         [x]? Safety Awareness   [x]? Endurance    []? Fine Coordination              [x]? Balance      []? Vision/perception    []? Sensation      []? Gross Motor Coordination  []? ROM           []?  Delirium                   []? Motor Control      OT PLAN OF CARE   OT POC based on physician orders, patient diagnosis and results of clinical assessment     Frequency/Duration 2-5 days/wk for 2 weeks PRN   Specific OT Treatment Interventions to include:   * Instruction/training on adapted ADL techniques and AE recommendations to increase functional independence within precautions       * Training on energy conservation strategies, correct breathing pattern and techniques to improve independence/tolerance for self-care routine  * Functional transfer/mobility training/DME recommendations for increased independence, safety, and fall prevention  * Patient/Family education to increase follow through with safety techniques and functional independence  * Recommendation of environmental modifications for increased safety with functional transfers/mobility and ADLs  * Splinting/positioning for increased function, prevention of contractures, and improve skin integrity  * Therapeutic exercise to improve motor endurance, ROM, and functional strength for ADLs/functional transfers  * Therapeutic activities to facilitate/challenge dynamic balance, stand tolerance for increased safety and independence with ADLs  * Therapeutic activities to facilitate gross/fine motor skills for increased independence with ADLs  * Neuro-muscular re-education: facilitation of righting/equilibrium reactions, midline orientation, scapular stability/mobility, normalization of muscle tone, and facilitation of volitional active controled movement        Recommended Adaptive Equipment/DME: LB AE, TBD      Home Living: Prior to brief PAZ stay patient lived alone in two story home with 3 steps to enter, 9 steps to second floor, and bed and half bath on main floor with tub/shower on second floor and walk-in shower in the basement with shower seat and standard commode.    Bathroom setup: tub/shower on 2nd floor   Equipment owned: shower chair, rollator     Prior Level of Function: Ind with ADLs , Ind with IADLs; ambulated with rollator PTA  Driving: Yes  Occupation: None stated, enjoys cooking     Family Assistance: Unknown family involvement     Pain Level: Pt did not complain of pain this session     Additional Comments: None     Cognition: A&O: 4/4; Follows 2 step directions              Memory: G               Sequencing: G              Problem solving: F+              Judgement/safety: G-     Vitals Assessed:  SpO2: 95%, 109HR at rest, 93%, 113 following activity. Functional Assessment:  AM-PAC Daily Activity Raw Score: 15/24    Initial Eval Status  Date: 3/2/22 Treatment Status  Date: 3/4/22 STGs = LTGs  Time frame: 10-14 days   Feeding Independent   Independent Independent    Grooming Minimal Assist   For thoroughness for hair care in seated position. Patient standing with good tolerance to activity and no SOB noted. Veronica  Pt washed face, brushed teeth, combed hair with assistance to reach back of head while seated upright in chair at sink Stand by Assist   While standing with FWW   UB Dressing Moderate Assist   While seated at EOB to don robe with assist around back and to thread arms. Veronica  Roni/doff hospital gown seated upright in chair    modA to roni as robe per previous level Minimal Assist  For object retrieval and donning/doffing   LB Dressing Moderate Assist   To don pants in standing  Max A to don/doff socks while seated at EOB.  maxA  Roni/doff hospital socks seated EOB. Assistance to thread pants and stand to pull over hips Minimal Assist   With use of LB AE PRN   Bathing Moderate Assist  D/t limited reach to lower body  modA  Sponge baitng task seated/standing at sink, with pt able to wash of UB, and vj area, with assistance to wash of LB and stand to wash of buttocks Minimal Assist   With use of LB AE PRN   Toileting Moderate Assist   D/t limited reach to backside for hygiene and limited dynamic balance for functional activities.  DNT  modA per previous level Minimal Assist    Bed Mobility  Supine to sit: Moderate Assist Sit to supine: NT modA  Supine<>EOB    HOB slightly elevated Supine to sit: Stand by Assist   Sit to supine: Stand by Assist    Functional Transfers Moderate Assist with FWW  For sit<>stand and SPT from EOB<>chair Veronica  Sit to Stand  Stand to Sit    Cueing for hand placement  Stand by Assist    Functional Mobility Minimal Assist with FWW  For short household distances  Veronica  Pt ambulated short household distance in room EOB<>Bathroom with w.w. Stand by Assist    Balance Sitting:     Static: Sup    Dynamic: Min A   Standing: Min A    during functional activity Sitting EOB:  SBA    Standing:  Veronica      Activity Tolerance Fair  No SOB or LOB noted during session. spO2 maintained throughout session.  Fair- WFL   Visual/  Perceptual Glasses: No  S/p cataract surgery          Safety G-   G      Hand Dominance: Right    AROM (PROM) Strength Additional Info:    RUE  WFL 4/5 good  and wfl FMC/dexterity noted during ADL tasks         LUE WFL 4-/5 good  and wfl FMC/dexterity noted during ADL tasks         Hearing: WFL   Sensation:  No c/o numbness or tingling   Tone: WFL   Edema: None noted        Education:  Pt was educated on role of OT, goals to be reached, importance of OOB activity, safety and hand placement with transfers, safety and walker management with functional mobility, compensatory strategies to assist with ADL tasks, and energy conservation techniques. Comments: Upon arrival pt supine in bed, agreeable to therapy, daughter present. Pt completed of bed mobility, transfers, functional mobility and ADL tasks this session. Monitoring of pt's O2 maintaining 97% on 1%, attempting to remove O2 from pt and placing ofn RA ranging from 86-95%, nursing aware placing pt back on 1L. At end of session, pt seated upright in chair, all lines and tubes intact, call light within reach, daughter present. · Pt has made fair progress towards set goals.    · Continue with current plan of care focusing on increasing of independency with transfers and ADL tasks      Treatment Time In: 9:40am          Treatment Time Out: 10:20am             Treatment Charges: Mins Units   Ther Ex  [id number]     Manual Therapy 12747     Thera Activities 55153 10 1   ADL/Home Mgt 94769 30 2   Neuro Re-ed 18960     Group Therapy      Orthotic manage/training  [id number]     Non-Billable Time     Total Timed Treatment 40 3        Abbie BHATT/L [id number]

## 2022-03-04 NOTE — PLAN OF CARE
Problem: Pain:  Goal: Pain level will decrease  Description: Pain level will decrease  Outcome: Met This Shift  Goal: Control of acute pain  Description: Control of acute pain  Outcome: Met This Shift  Goal: Control of chronic pain  Description: Control of chronic pain  Outcome: Met This Shift     Problem: Skin Integrity:  Goal: Will show no infection signs and symptoms  Description: Will show no infection signs and symptoms  Outcome: Met This Shift  Goal: Absence of new skin breakdown  Description: Absence of new skin breakdown  Outcome: Met This Shift     Problem: Falls - Risk of:  Goal: Will remain free from falls  Description: Will remain free from falls  Outcome: Met This Shift  Goal: Absence of physical injury  Description: Absence of physical injury  Outcome: Met This Shift     Problem: Musculor/Skeletal Functional Status  Goal: Absence of falls  Outcome: Met This Shift     Problem: Musculor/Skeletal Functional Status  Goal: Highest potential functional level  Outcome: Ongoing

## 2022-03-05 NOTE — DISCHARGE SUMMARY
Physician Discharge Summary     Patient ID:  Willy Harmon  [id number]  78 y.o.  1940    Admit date: 2/28/2022    Discharge date and time: 3/5/2022 12:09 AM     Admission Diagnoses: Back pain [M54.9]  Urinary tract infection with hematuria, site unspecified [N39.0, R31.9]  Other pulmonary embolism without acute cor pulmonale, unspecified chronicity (HCC) [I26.99]  Acute back pain, unspecified back location, unspecified back pain laterality [M54.9]    Discharge Diagnoses:   Acute cystitis  Recent multiple vascular procedures including IVC filter placement  Thrombophilia  Patient Active Problem List   Diagnosis    Essential hypertension, benign    Lymphedema    Osteoarthritis    Hyperlipidemia    Anxiety    Acute mesenteric arterial occlusion ([address]    Acute saddle pulmonary embolism with acute cor pulmonale (HCC)    Occlusion of left subclavian artery    Limb ischemia    Multiple pulmonary emboli ([address]    Back pain       Consults: Vascular    Procedures:     Hospital Course:   78-year-old woman discharged to [facility] recently following IVC filter placement, mesenteric arterial thrombectomy  Left axillary arterial thrombectomy  She presented back to emergency within 5 hours due to nausea and emesis  CT scan of the abdomen showing a questionable mass later was determined as a small hematoma  Urinalysis was suggestive of UTI  She was put on Rocephin  Symptomatic therapy for nausea and emesis provided with Zofran  She improved remarkably in 24 hours  Urine culture showing mixed chana  Antibiotics switched to GIBBS NATALY  She was discharged back to the subacute rehab in stable condition  Eliquis dosage was reduced to maintenance dose of 5 mg twice daily long-term  Discharge Exam:  See progress note from today    Disposition: Stable at the time of discharge  Subacute rehab    Patient Instructions:   Discharge Medication List as of 3/4/2022  6:50 PM      START taking these medications    Details   cefdinir (OMNICEF) 300 MG capsule Take 1 capsule by mouth every 12 hours for 2 days, Disp-4 capsule, R-0DC to SNF         CONTINUE these medications which have NOT CHANGED    Details   apixaban (ELIQUIS) 5 MG TABS tablet Take 1 tablet by mouth 2 times daily, Disp-60 tabletDC to SNF      gabapentin (NEURONTIN) 300 MG capsule Take 1 capsule by mouth 3 times daily for 30 days. , Disp-90 capsule, R-3DC to SNF      DULoxetine (CYMBALTA) 20 MG extended release capsule Take 1 capsule by mouth every other day, Disp-30 capsule, R-3DC to SNF      polyethylene glycol (GLYCOLAX) 17 g packet Take 17 g by mouth daily, Disp-527 g, R-1DC to SNF      furosemide (LASIX) 20 MG tablet Take 1 tablet by mouth daily, Disp-30 tablet, R-3Normal      potassium chloride (KLOR-CON M) 20 MEQ extended release tablet Take 1 tablet by mouth daily, Disp-30 tablet, R-3Normal      metFORMIN (GLUCOPHAGE) 500 MG tablet Take 1 tablet by mouth 2 times daily (with meals), Disp-180 tablet, R-3Please DeliverNormal      losartan-hydrochlorothiazide (HYZAAR) 100-25 MG per tablet Take 1 tablet by mouth daily, Disp-90 tablet, R-5Normal      atorvastatin (LIPITOR) 20 MG tablet Take 1 tablet by mouth daily, Disp-90 tablet, R-3Normal      Multiple Vitamins-Minerals (THERAPEUTIC MULTIVITAMIN-MINERALS) tablet Take 1 tablet by mouth daily      calcium carbonate (OSCAL) 500 MG TABS tablet Take 500 mg by mouth daily Indications: taking 3 timew a week Historical Med           Activity: As tolerated  Diet: General    Follow-up with PCP and vascular    Note that over 40 minutes was spent in preparing discharge papers, discussing discharge with patient, medication review, etc.    Signed:  Milton Valencia MD  3/5/2022  5:40 PM

## 2022-03-07 ENCOUNTER — TELEPHONE (OUTPATIENT)
Dept: HEMATOLOGY | Age: 82
End: 2022-03-07

## 2022-03-07 ENCOUNTER — TELEPHONE (OUTPATIENT)
Dept: VASCULAR SURGERY | Age: 82
End: 2022-03-07

## 2022-03-07 NOTE — TELEPHONE ENCOUNTER
Thomas Diamond from [place] called and we scheduled the patient for a hospital follow up on 03/17/2022 at 12;45 pm. I told the patient to park in the er parking lot. Enter through the doors under canopy B, walk straight ahead to the elevators on the right hand side. Take those elevators down to 1R. As soon as the doors open, that's our waiting room area. Make sure you check in with the girls behind the glass offfice to let them know you are here to see Dr. Danial Hutchinson. He verbalized understanding. While making the appt mariah had the wrong birthdate on their end of 1940, jose said he would double check with the family and make changes if needed.  I told jose I looked at the patients id we had in her chart and her birthday on there is 50-  Electronically signed by Reese Conway on 3/7/2022 at 2:17 PM

## 2022-03-07 NOTE — TELEPHONE ENCOUNTER
Called to confirm appointment for 3/8/22 at 10 a.m, left message with date, time, and phone number for patient.

## 2022-03-08 ENCOUNTER — OFFICE VISIT (OUTPATIENT)
Dept: VASCULAR SURGERY | Age: 82
End: 2022-03-08

## 2022-03-08 DIAGNOSIS — I99.8 LIMB ISCHEMIA: Primary | ICD-10-CM

## 2022-03-08 DIAGNOSIS — K55.069: ICD-10-CM

## 2022-03-08 PROCEDURE — 99024 POSTOP FOLLOW-UP VISIT: CPT | Performed by: NURSE PRACTITIONER

## 2022-03-08 RX ORDER — ZINC SULFATE 50(220)MG
50 CAPSULE ORAL DAILY
COMMUNITY

## 2022-03-08 NOTE — PROGRESS NOTES
Vascular Surgery Progress Note    Chief Complaint   Patient presents with    Post-Op Check     s/p IVC filter, embolectomy       Patient returns for post operative evaluation status post left upper extremity embolectomy, SMA embolectomy, and vena cava filter insertion. The patient is accompanied by her children. The patient denies any unexpected problems since hospital discharge, except for being readmitted for UTI. She is feeling much better now. She denies any abdominal pain and is having regula bowel movements. She has a good appetite. She remains on eliquis. She does have some leg swelling and has a history of lymphedema s/p lymphedema therapy in the past.         Procedure Laterality Date    ABDOMINAL AORTIC ANEURYSM REPAIR N/A 2/19/2022    EXPLORATORY LAPAROTOMY, MESENTERIC ARTERY EMBOLECTOMY performed by Ayala Swann MD at [facility] Left 2/19/2022    LEFT ARM  EMBOLECTOMY performed by Ayala Swann MD at [facility] Right 2/19/2022    VENA CAVA FILTER INSERTION performed by Ayala Swann MD at [address] [place]       Physical Exam:  The incision(s) are healing without evidence of infection. Heart rhythm is regular. Right      Left   Brachial 2 2   Radial 2 2   Femoral     Popliteal     Dorsalis Pedis     Posterior Tibial     (3=normal, 2=diminished, 1=barely palpable, 4=widened)    Problem List Items Addressed This Visit     None        I reviewed with the patient that her vascular exam remains stable. I reviewed with the patient that normal activities can be resumed as tolerated. I reviewed with the patient and her family that the most important treatment for leg swelling is leg elevation as gravity is what causes the fluid to accumulate when the legs are dependent. Compression is the treatment to reduce fluid from re-accumulating. I will not schedule her a follow up appointment, but asked her to call back with any problems. Pt seen and plan reviewed with Dr. Mercedes Kruse.      Julita Lobo, APRN - CNP

## 2022-03-17 ENCOUNTER — OFFICE VISIT (OUTPATIENT)
Dept: HEMATOLOGY | Age: 82
End: 2022-03-17
Payer: MEDICARE

## 2022-03-17 VITALS
TEMPERATURE: 97.2 F | HEART RATE: 90 BPM | OXYGEN SATURATION: 93 % | SYSTOLIC BLOOD PRESSURE: 138 MMHG | BODY MASS INDEX: 34.04 KG/M2 | WEIGHT: 185 LBS | RESPIRATION RATE: 18 BRPM | HEIGHT: 62 IN | DIASTOLIC BLOOD PRESSURE: 72 MMHG

## 2022-03-17 DIAGNOSIS — K76.89 HEPATIC CYST: Primary | ICD-10-CM

## 2022-03-17 PROCEDURE — 99213 OFFICE O/P EST LOW 20 MIN: CPT | Performed by: TRANSPLANT SURGERY

## 2022-03-17 PROCEDURE — 99212 OFFICE O/P EST SF 10 MIN: CPT | Performed by: TRANSPLANT SURGERY

## 2022-03-17 RX ORDER — ASCORBIC ACID 500 MG
500 TABLET ORAL 2 TIMES DAILY
COMMUNITY

## 2022-03-17 RX ORDER — ACETAMINOPHEN 325 MG/1
650 TABLET ORAL EVERY 6 HOURS PRN
COMMUNITY

## 2022-03-17 NOTE — PROGRESS NOTES
Hepatobiliary and Pancreatic Surgery Progress Note    CC: Hepatic mass    Subjective: Patient was recently admitted to the hospital with arm and abdominal pain. She was found to have arterial thrombosis' and underwent embolectomy of her left arm along with her SMA. On CTA imaging there were concerns for a liver mass. Once she recovered she had further liver imaging which     OBJECTIVE      Physical    /72   Pulse 90   Temp 97.2 °F (36.2 °C) (Temporal)   Resp 18   Ht 5' 2\" (1.575 m)   Wt 185 lb (83.9 kg)   SpO2 93%   BMI 33.84 kg/m²       General appearance: appears in no acute distress  Lungs:respiratory effort normal without accessory numbers  Heart: no pedal edema  Abdomen: soft, nondistended, nontympanic, no guarding, no peritoneal signs, normoactive bowel sounds  Extremities: ROM normal    ASSESSMENT: s/p sMA embolectomy with incidental hepatic cyst    PLAN:    - I reviewed their images prior to our office visit and we also reviewed them together today. - we discussed that these are benign cysts without worrisome features  - we discussed that these do not need any long term follow up. - we discussed that if she has a non contrasted CT scan it will look like a mass but it is likely her cyst.    20Minutes of which greater than 50% was spent counseling or coordinating her care. Thank you for the consultation and allowing me to take part in Ms. Cleaning's care.     Please send a copy of my note to Dr. Jack Ortega MD 3/17/2022 1:25 PM

## 2022-03-18 ENCOUNTER — OFFICE VISIT (OUTPATIENT)
Dept: ONCOLOGY | Age: 82
End: 2022-03-18
Payer: MEDICARE

## 2022-03-18 ENCOUNTER — HOSPITAL ENCOUNTER (OUTPATIENT)
Dept: INFUSION THERAPY | Age: 82
Discharge: HOME | End: 2022-03-18

## 2022-03-18 VITALS
DIASTOLIC BLOOD PRESSURE: 64 MMHG | HEIGHT: 62 IN | WEIGHT: 185 LBS | BODY MASS INDEX: 34.04 KG/M2 | TEMPERATURE: 97.3 F | SYSTOLIC BLOOD PRESSURE: 157 MMHG | HEART RATE: 120 BPM | OXYGEN SATURATION: 91 %

## 2022-03-18 DIAGNOSIS — I26.02 ACUTE SADDLE PULMONARY EMBOLISM WITH ACUTE COR PULMONALE (HCC): Primary | ICD-10-CM

## 2022-03-18 PROCEDURE — 99214 OFFICE O/P EST MOD 30 MIN: CPT

## 2022-03-18 PROCEDURE — 99205 OFFICE O/P NEW HI 60 MIN: CPT | Performed by: INTERNAL MEDICINE

## 2022-03-18 NOTE — PROGRESS NOTES
Winnie Cleaning  1940 78 y.o. Referring Physician: Dr. Diane Sawyer    PCP: Thien Dunn MD    Vitals:    22 1428   BP: (!) 157/64   Pulse: 120   Temp: 97.3 °F (36.3 °C)   SpO2: 91%        Wt Readings from Last 3 Encounters:   22 185 lb (83.9 kg)   22 185 lb (83.9 kg)   22 194 lb (88 kg)        Body mass index is 33.84 kg/m². Chief Complaint: No chief complaint on file. LMP: 48 hysterectomy, did have first DVT    Age at first Menses: 13    : 3    Para: 3          Current Outpatient Medications:     acetaminophen (TYLENOL) 325 MG tablet, Take 650 mg by mouth every 6 hours as needed for Pain, Disp: , Rfl:     vitamin C (ASCORBIC ACID) 500 MG tablet, Take 500 mg by mouth 2 times daily, Disp: , Rfl:     vitamin D (CHOLECALCIFEROL) 25 MCG (1000 UT) TABS tablet, Take 1,000 Units by mouth daily, Disp: , Rfl:     zinc sulfate (ZINCATE) 220 (50 Zn) MG capsule, Take 50 mg by mouth daily, Disp: , Rfl:     apixaban (ELIQUIS) 5 MG TABS tablet, Take 1 tablet by mouth 2 times daily, Disp: 60 tablet, Rfl:     gabapentin (NEURONTIN) 300 MG capsule, Take 1 capsule by mouth 3 times daily for 30 days. , Disp: 90 capsule, Rfl: 3    DULoxetine (CYMBALTA) 20 MG extended release capsule, Take 1 capsule by mouth every other day, Disp: 30 capsule, Rfl: 3    polyethylene glycol (GLYCOLAX) 17 g packet, Take 17 g by mouth daily, Disp: 527 g, Rfl: 1    furosemide (LASIX) 20 MG tablet, Take 1 tablet by mouth daily, Disp: 30 tablet, Rfl: 3    potassium chloride (KLOR-CON M) 20 MEQ extended release tablet, Take 1 tablet by mouth daily, Disp: 30 tablet, Rfl: 3    metFORMIN (GLUCOPHAGE) 500 MG tablet, Take 1 tablet by mouth 2 times daily (with meals), Disp: 180 tablet, Rfl: 3    losartan-hydrochlorothiazide (HYZAAR) 100-25 MG per tablet, Take 1 tablet by mouth daily, Disp: 90 tablet, Rfl: 5    atorvastatin (LIPITOR) 20 MG tablet, Take 1 tablet by mouth daily, Disp: 90 tablet, Rfl: 3    Multiple Vitamins-Minerals (THERAPEUTIC MULTIVITAMIN-MINERALS) tablet, Take 1 tablet by mouth daily, Disp: , Rfl:     calcium carbonate (OSCAL) 500 MG TABS tablet, Take 500 mg by mouth daily Indications: taking 3 timew a week , Disp: , Rfl:        Past Medical History:   Diagnosis Date    Anxiety     Arthritis     osteoarthritis    Depression     Diverticulitis     Dupuytren's contracture     History of UTI     Hyperlipidemia     Hypertension     Hypothyroidism     Lymphedema     Pre-diabetes        Past Surgical History:   Procedure Laterality Date    ABDOMINAL AORTIC ANEURYSM REPAIR N/A 2/19/2022    EXPLORATORY LAPAROTOMY, MESENTERIC ARTERY EMBOLECTOMY performed by Faye Aguilar MD at [facility] Left 2/19/2022    LEFT ARM  EMBOLECTOMY performed by Faye Aguilar MD at [facility] Right 2/19/2022    VENA CAVA FILTER INSERTION performed by Faye Aguilar MD at [address] [place]       No family history on file. Social History     Socioeconomic History    Marital status:      Spouse name: Not on file    Number of children: Not on file    Years of education: Not on file    Highest education level: Not on file   Occupational History    Not on file   Tobacco Use    Smoking status: Never Smoker    Smokeless tobacco: Never Used   Vaping Use    Vaping Use: Never used   Substance and Sexual Activity    Alcohol use: No     Alcohol/week: 0.0 standard drinks    Drug use: Never    Sexual activity: Not on file   Other Topics Concern    Not on file   Social History Narrative    Not on file     Social Determinants of Health     Financial Resource Strain:     Difficulty of Paying Living Expenses: Not on file   Food Insecurity:     Worried About Running Out of Food in the Last Year: Not on file    Irma of Food in the Last Year: Not on file   Transportation Needs:     Lack of Transportation (Medical):  Not on file  Lack of Transportation (Non-Medical): Not on file   Physical Activity:     Days of Exercise per Week: Not on file    Minutes of Exercise per Session: Not on file   Stress:     Feeling of Stress : Not on file   Social Connections:     Frequency of Communication with Friends and Family: Not on file    Frequency of Social Gatherings with Friends and Family: Not on file    Attends Mandaeism Services: Not on file    Active Member of [address] [place] [address] or Organizations: Not on file    Attends Club or Organization Meetings: Not on file    Marital Status: Not on file   Intimate Partner Violence:     Fear of Current or Ex-Partner: Not on file    Emotionally Abused: Not on file    Physically Abused: Not on file    Sexually Abused: Not on file   Housing Stability:     Unable to Pay for Housing in the Last Year: Not on file    Number of [place] in the Last Year: Not on file    Unstable Housing in the Last Year: Not on file           Occupation: seamstress  Retired:  YES: Patient is retired from [address] [place] [address]. REVIEW OF SYSTEMS: <<For Level 5, 10 or more systems>>     Pacemaker/Defibulator/ICD:  No    Mediport: No           FALLS RISK SCREENING ASSESSMENT    Instructions:  Assess the patient and [place] the appropriate indicators that are present for fall risk identification. Total the numbers circled and assign a fall risk score from Table 2.  Reassess patient at a minimum every 12 weeks or with status change. Assessment   Date  3/18/2022     1. Mental Ability: confusion/cognitively impaired No - 0       2. Elimination Issues: incontinence, frequency No - 0       3. Ambulatory: use of assistive devices (walker, cane, off-loading devices), attached to equipment (IV pole, oxygen) Yes - 2     4. Sensory Limitations: dizziness, vertigo, impaired vision No - 0       5. Age 72 years or greater - 1       10. Medication: diuretics, strong analgesics, hypnotics, sedatives, antihypertensive agents   No - 0   7. Falls:  recent history of falls within the last 3 months (not to include slipping or tripping)   No - 0   TOTAL 3    If score of 4 or greater was education given? No       TABLE 2   Risk Score Risk Level Plan of Care   0-3 Little or  No Risk 1. Provide assistance as indicated for ambulation activities  2. Reorient confused/cognitively impaired patient  3. Call-light/bell within patient's reach  4. Chair/bed in low position, stretcher/bed with siderails up except when performing patient care activities  5. Educate patient/family/caregiver on falls prevention  6.  Reassess in 12 weeks or with any noted change in patient condition which places them at a risk for a fall   4-6 Moderate Risk 1. Provide assistance as indicated for ambulation activities  2. Reorient confused/cognitively impaired patient  3. Call-light/bell within patient's reach  4. Chair/bed in low position, stretcher/bed with siderails up except when performing patient care activities  5. Educate patient/family/caregiver on falls prevention  6. Falls risk precaution (Yellow sticker Level II) placed on patient chart   7 or   Higher High Risk 1. Place patient in easily observable treatment room  2. Patient attended at all times by family member or staff  3. Provide assistance as indicated for ambulation activities  4. Reorient confused/cognitively impaired patient  5. Call-light/bell within patient's reach  6. Chair/bed in low position, stretcher/bed with siderails up except when performing patient care activities  7. Educate patient/family/caregiver on falls prevention  8.   Falls risk precaution (Yellow sticker Level III) placed on patient chart                     Gloria Fabian RN

## 2022-03-18 NOTE — PROGRESS NOTES
Department of [facility]  Attending Consult Note    Reason for Visit:  Consultation on a patient with Hx of DVT/PE    Referring Physician: Chloe Castle MD    PCP:  Chloe Castle MD    History of Present Illness:  78 y. o. female with Hx of HTN, DMII, lymphedema, multiple prior DVT who was treated with short term anticoagulation (Coumadin), was not currently taking, who presented with bilateral massive PE, left subclavian artery thrombus, and SMA thrombus. She was started on a heparin drip and then taken to OR by [facility] for SMA embolectomy, IVC filter, and left arm embolectomy.     Hypercoag work-up:  D-Dimer 4531  Homocysteine 4.3 (0-15)    KHRIS      Negative  DRVVT Negative  Cardiolipin antibodies:                                         IgG <10 (<14), IgM: 11 (<12) IgA: <10 (<11)  Beta-2 Glyco 1 IgA <=20 JAVIER 19      AT-III Activity 64% (%)- may be low in acute setting. 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 No bleeding noted. Dr. Nathalie Roman consulted to evaluate the liver lesion on CTA. CTA triphasic liver consistent with hepatic cysts. 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 Fair appetite and energy level. ENMT: Eyes: No diplopia; Nose: No epistaxis. Mouth: No sore throat. RESPIRATORY: No hemoptysis, shortness of breath, cough. CARDIOVASCULAR: No chest pain, palpitations.   GASTROINTESTINAL: No nausea/vomiting, abdominal pain  GENITOURINARY: No dysuria, urinary frequency, hematuria. NEURO: No syncope, presyncope, headache. Remainder:  ROS NEGATIVE    Past Medical History:      Diagnosis Date    Anxiety     Arthritis     osteoarthritis    Depression     Diverticulitis     Dupuytren's contracture     History of UTI     Hyperlipidemia     Hypertension     Hypothyroidism     Lymphedema     Pre-diabetes      Past Surgical History:      Procedure Laterality Date    ABDOMINAL AORTIC ANEURYSM REPAIR N/A 2/19/2022    EXPLORATORY LAPAROTOMY, MESENTERIC ARTERY EMBOLECTOMY performed by Jae Seaman MD at [address] [place] [address] Left 2/19/2022    LEFT ARM  EMBOLECTOMY performed by Jae Seaman MD at [facility] Right 2/19/2022    VENA CAVA FILTER INSERTION performed by Jae Seaman MD at [facility]     Family History:  No family history on file. Medications:  Reviewed and reconciled. Social History:  Social History     Socioeconomic History    Marital status:      Spouse name: Not on file    Number of children: Not on file    Years of education: Not on file    Highest education level: Not on file   Occupational History    Not on file   Tobacco Use    Smoking status: Never Smoker    Smokeless tobacco: Never Used   Vaping Use    Vaping Use: Never used   Substance and Sexual Activity    Alcohol use: No     Alcohol/week: 0.0 standard drinks    Drug use: Never    Sexual activity: Not on file   Other Topics Concern    Not on file   Social History Narrative    Not on file     Social Determinants of Health     Financial Resource Strain:     Difficulty of Paying Living Expenses: Not on file   Food Insecurity:     Worried About Running Out of Food in the Last Year: Not on file    Irma of Food in the Last Year: Not on file   Transportation Needs:     Lack of Transportation (Medical): Not on file    Lack of Transportation (Non-Medical):  Not on file   Physical Activity:     Days of Exercise per Week: Not on file    Minutes of Exercise per Session: Not on file   Stress:     Feeling of Stress : Not on file   Social Connections:     Frequency of Communication with Friends and Family: Not on file    Frequency of Social Gatherings with Friends and Family: Not on file    Attends Adventism Services: Not on file    Active Member of [address] [place] [address] [employer] or Organizations: Not on file    Attends Club or Organization Meetings: Not on file    Marital Status: Not on file   Intimate Partner Violence:     Fear of Current or Ex-Partner: Not on file    Emotionally Abused: Not on file    Physically Abused: Not on file    Sexually Abused: Not on file   Housing Stability:     Unable to Pay for Housing in the Last Year: Not on file    Number of [place] in the Last Year: Not on file    Unstable Housing in the Last Year: Not on file     Allergies:  No Known Allergies    Physical Exam:  BP (!) 157/64   Pulse 120   Temp 97.3 °F (36.3 °C)   Ht 5' 2\" (1.575 m)   Wt 185 lb (83.9 kg)   SpO2 91%   BMI 33.84 kg/m²   GENERAL: Alert, oriented x 3, not in acute distress. HEENT: PERRLA; EOMI. Oropharynx clear. NECK: Supple. Without lymphadenopathy. LUNGS: Good air entry bilaterally. No wheezing, crackles or ronchi. CARDIOVASCULAR: Regular rate. No murmurs, rubs or gallops. ABDOMEN: Soft. Non-tender, non-distended  EXTREMITIES: Without clubbing, cyanosis, or edema. NEUROLOGIC: No focal deficits. Impression/Plan:  78 y. o. female with Hx of HTN, DMII, lymphedema, multiple prior DVT who was treated with short term anticoagulation (Coumadin), was not currently taking, who presented with bilateral massive PE, left subclavian artery thrombus, and SMA thrombus.    She was started on a heparin drip and then taken to OR by [facility] for SMA embolectomy, IVC filter, and left arm embolectomy.     Hypercoag work-up:  D-Dimer 4531  Homocysteine 4.3 (0-15)    KHRIS      Negative  DRVVT Negative  Cardiolipin antibodies:                                          IgG <10 (<14), IgM: 11 (<12) IgA: <10 (<11)  Beta-2 Glyco 1 IgA <=20 JAVIER 19      AT-III Activity 64% (%)- may be low in acute setting. 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 No bleeding noted. Dr. Laura Barraza consulted to evaluate the liver lesion on CTA. CTA triphasic liver consistent with hepatic cysts. 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 D-Dimer, homocysteine anti-thrombin III ordered  Continue Eliquis 5 mg po bid  RTC 3 months with labs (D-Dimer). Patient and family verbalized understanding. All their questions were answered to satisfaction    Thank you for allowing us to participate in the care of .  Tristan Macario MD   3/18/2022

## 2022-03-23 ENCOUNTER — HOSPITAL ENCOUNTER (OUTPATIENT)
Age: 82
Discharge: HOME | End: 2022-03-23
Payer: MEDICARE

## 2022-03-23 DIAGNOSIS — I26.02 ACUTE SADDLE PULMONARY EMBOLISM WITH ACUTE COR PULMONALE (HCC): ICD-10-CM

## 2022-03-23 LAB
D DIMER: 223 NG/ML DDU
HOMOCYSTEINE: 12.1 UMOL/L (ref 0–15)

## 2022-03-23 PROCEDURE — 83090 ASSAY OF HOMOCYSTEINE: CPT

## 2022-03-23 PROCEDURE — 85378 FIBRIN DEGRADE SEMIQUANT: CPT

## 2022-03-23 PROCEDURE — 36415 COLL VENOUS BLD VENIPUNCTURE: CPT

## 2022-03-23 NOTE — PROGRESS NOTES
Physician Progress Note      PATIENT:               Lilia Mendes  CSN #:                  [id number]  :                       1940  ADMIT DATE:       2022 10:34 PM  [address] DATE:        3/5/2022 12:09 AM  RESPONDING  PROVIDER #:        Divya Schaefer MD          QUERY TEXT:    Pt admitted with back pain, UTI and has Other pulmonary embolism without acute   cor pulmonale, unspecified chronicity documented per DC summary. If possible,   please document in progress notes and discharge summary if you are evaluating   and/or treating any of the following: The medical record reflects the following:  Risk Factors:  PE, thrombophilia, HTN, DM2, Recent multiple vascular   procedures including IVC filter placement  Clinical Indicators: H&P- Recent IVC filter placement for pulmonary emboli. Resume home medications including the loading dose of Eliquis. DC summary-   Other pulmonary embolism without acute cor pulmonale, unspecified chronicity. Eliquis dosage was reduced to maintenance dose of 5 mg twice daily long-term. CTA- pulmonary embolism on right main pulmonary artery and lobar branches   which appears similar to prior, this is a known finding. Treatment: CTA, Eliquis, labs, monitoring, consults    Thank you,  Alexanedr Gallagher RN  Clinical   [phone]  Options provided:  -- Acute pulmonary embolism  -- Recurrent acute pulmonary embolism  -- Chronic pulmonary embolism  -- Other - I will add my own diagnosis  -- Disagree - Not applicable / Not valid  -- Disagree - Clinically unable to determine / Unknown  -- Refer to Clinical Documentation Reviewer    PROVIDER RESPONSE TEXT:    This patient has acute pulmonary embolism.     Query created by: Pranav Landaverde on 3/23/2022 1:58 PM      Electronically signed by:  Divya Schaefer MD 3/23/2022 2:28 PM

## 2022-06-13 ENCOUNTER — HOSPITAL ENCOUNTER (OUTPATIENT)
Age: 82
Discharge: HOME | End: 2022-06-13
Payer: MEDICARE

## 2022-06-13 DIAGNOSIS — I26.99 MULTIPLE PULMONARY EMBOLI (HCC): Primary | ICD-10-CM

## 2022-06-13 DIAGNOSIS — I26.99 MULTIPLE PULMONARY EMBOLI (HCC): ICD-10-CM

## 2022-06-13 LAB
ALBUMIN SERPL-MCNC: 4.1 G/DL (ref 3.5–5.2)
ALP BLD-CCNC: 64 U/L (ref 35–104)
ALT SERPL-CCNC: 19 U/L (ref 0–32)
ANION GAP SERPL CALCULATED.3IONS-SCNC: 12 MMOL/L (ref 7–16)
AST SERPL-CCNC: 19 U/L (ref 0–31)
BASOPHILS ABSOLUTE: 0.06 E9/L (ref 0–0.2)
BASOPHILS RELATIVE PERCENT: 0.6 % (ref 0–2)
BILIRUB SERPL-MCNC: 0.3 MG/DL (ref 0–1.2)
BUN BLDV-MCNC: 17 MG/DL (ref 6–23)
CALCIUM SERPL-MCNC: 9.9 MG/DL (ref 8.6–10.2)
CHLORIDE BLD-SCNC: 103 MMOL/L (ref 98–107)
CO2: 26 MMOL/L (ref 22–29)
CREAT SERPL-MCNC: 0.9 MG/DL (ref 0.5–1)
D DIMER: <200 NG/ML DDU
EOSINOPHILS ABSOLUTE: 0.1 E9/L (ref 0.05–0.5)
EOSINOPHILS RELATIVE PERCENT: 1 % (ref 0–6)
GFR AFRICAN AMERICAN: >60
GFR NON-AFRICAN AMERICAN: 60 ML/MIN/1.73
GLUCOSE BLD-MCNC: 107 MG/DL (ref 74–99)
HCT VFR BLD CALC: 40.3 % (ref 34–48)
HEMOGLOBIN: 12.4 G/DL (ref 11.5–15.5)
IMMATURE GRANULOCYTES #: 0.04 E9/L
IMMATURE GRANULOCYTES %: 0.4 % (ref 0–5)
LYMPHOCYTES ABSOLUTE: 2.38 E9/L (ref 1.5–4)
LYMPHOCYTES RELATIVE PERCENT: 24.6 % (ref 20–42)
MCH RBC QN AUTO: 24.1 PG (ref 26–35)
MCHC RBC AUTO-ENTMCNC: 30.8 % (ref 32–34.5)
MCV RBC AUTO: 78.4 FL (ref 80–99.9)
MONOCYTES ABSOLUTE: 0.58 E9/L (ref 0.1–0.95)
MONOCYTES RELATIVE PERCENT: 6 % (ref 2–12)
NEUTROPHILS ABSOLUTE: 6.51 E9/L (ref 1.8–7.3)
NEUTROPHILS RELATIVE PERCENT: 67.4 % (ref 43–80)
PDW BLD-RTO: 14.5 FL (ref 11.5–15)
PLATELET # BLD: 352 E9/L (ref 130–450)
PMV BLD AUTO: 10.1 FL (ref 7–12)
POTASSIUM SERPL-SCNC: 4.7 MMOL/L (ref 3.5–5)
RBC # BLD: 5.14 E12/L (ref 3.5–5.5)
SODIUM BLD-SCNC: 141 MMOL/L (ref 132–146)
TOTAL PROTEIN: 7.1 G/DL (ref 6.4–8.3)
WBC # BLD: 9.7 E9/L (ref 4.5–11.5)

## 2022-06-13 PROCEDURE — 80053 COMPREHEN METABOLIC PANEL: CPT

## 2022-06-13 PROCEDURE — 85025 COMPLETE CBC W/AUTO DIFF WBC: CPT

## 2022-06-13 PROCEDURE — 36415 COLL VENOUS BLD VENIPUNCTURE: CPT

## 2022-06-13 PROCEDURE — 85378 FIBRIN DEGRADE SEMIQUANT: CPT

## 2022-06-17 ENCOUNTER — OFFICE VISIT (OUTPATIENT)
Dept: ONCOLOGY | Age: 82
End: 2022-06-17
Payer: MEDICARE

## 2022-06-17 ENCOUNTER — HOSPITAL ENCOUNTER (OUTPATIENT)
Age: 82
Discharge: HOME | End: 2022-06-17
Payer: MEDICARE

## 2022-06-17 ENCOUNTER — HOSPITAL ENCOUNTER (OUTPATIENT)
Dept: INFUSION THERAPY | Age: 82
Discharge: HOME | End: 2022-06-17

## 2022-06-17 VITALS
DIASTOLIC BLOOD PRESSURE: 86 MMHG | HEIGHT: 62 IN | SYSTOLIC BLOOD PRESSURE: 166 MMHG | HEART RATE: 77 BPM | TEMPERATURE: 97.5 F | BODY MASS INDEX: 32.46 KG/M2 | WEIGHT: 176.4 LBS | OXYGEN SATURATION: 95 %

## 2022-06-17 DIAGNOSIS — I26.02 ACUTE SADDLE PULMONARY EMBOLISM WITH ACUTE COR PULMONALE (HCC): Primary | ICD-10-CM

## 2022-06-17 PROCEDURE — 85300 ANTITHROMBIN III ACTIVITY: CPT

## 2022-06-17 PROCEDURE — 1123F ACP DISCUSS/DSCN MKR DOCD: CPT | Performed by: INTERNAL MEDICINE

## 2022-06-17 PROCEDURE — 99212 OFFICE O/P EST SF 10 MIN: CPT

## 2022-06-17 PROCEDURE — 99214 OFFICE O/P EST MOD 30 MIN: CPT | Performed by: INTERNAL MEDICINE

## 2022-06-17 PROCEDURE — 36415 COLL VENOUS BLD VENIPUNCTURE: CPT

## 2022-06-17 RX ORDER — DESLORATADINE 5 MG/1
TABLET ORAL
COMMUNITY
Start: 2022-06-08

## 2022-06-17 RX ORDER — ONDANSETRON 4 MG/1
TABLET, FILM COATED ORAL
COMMUNITY
Start: 2022-03-22

## 2022-06-17 RX ORDER — LOSARTAN POTASSIUM 100 MG/1
TABLET ORAL
COMMUNITY
Start: 2022-06-09

## 2022-06-17 RX ORDER — PANTOPRAZOLE SODIUM 40 MG/1
40 TABLET, DELAYED RELEASE ORAL DAILY
COMMUNITY
Start: 2022-06-08

## 2022-06-17 RX ORDER — HYDROCHLOROTHIAZIDE 25 MG/1
TABLET ORAL
COMMUNITY
Start: 2022-03-18

## 2022-06-17 NOTE — PROGRESS NOTES
Department of [facility]  Attending Clinic Note    Reason for Visit: Follow-up on a patient with Hx of DVT/PE    PCP:  Dante Blair MD    History of Present Illness:  78 y. o. female with Hx of HTN, DMII, lymphedema, multiple prior DVT who was treated with short term anticoagulation (Coumadin), was not currently taking, who presented with bilateral massive PE, left subclavian artery thrombus, and SMA thrombus. She was started on a heparin drip and then taken to OR by [facility] for SMA embolectomy, IVC filter, and left arm embolectomy.     Hypercoag work-up:  D-Dimer 4531  Homocysteine 4.3 (0-15)    KHRIS      Negative  DRVVT Negative  Cardiolipin antibodies:                                         IgG <10 (<14), IgM: 11 (<12) IgA: <10 (<11)  Beta-2 Glyco 1 IgA <=20 JAVIER 19      AT-III Activity 64% (%)- may be low in acute setting. 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 No bleeding noted. Dr. Johanna Becerra consulted to evaluate the liver lesion on CTA. CTA triphasic liver consistent with hepatic cysts. 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 No fever, chills. Fair appetite and energy level. Walks using a cane. No bleeding noted. Tolerating Eliquis well. Review of Systems;  CONSTITUTIONAL: No fever, chills. Fair appetite and energy level. ENMT: Eyes: No diplopia; Nose: No epistaxis. Mouth: No sore throat.   RESPIRATORY: No hemoptysis, shortness of breath, cough. CARDIOVASCULAR: No chest pain, palpitations. GASTROINTESTINAL: No nausea/vomiting, abdominal pain  GENITOURINARY: No dysuria, urinary frequency, hematuria. NEURO: No syncope, presyncope, headache. Remainder:  ROS NEGATIVE    Past Medical History:      Diagnosis Date    Anxiety     Arthritis     osteoarthritis    Depression     Diverticulitis     Dupuytren's contracture     History of UTI     Hyperlipidemia     Hypertension     Hypothyroidism     Lymphedema     Pre-diabetes      Medications:  Reviewed and reconciled. Allergies:  No Known Allergies    Physical Exam:  BP (!) 166/86   Pulse 77   Temp 97.5 °F (36.4 °C)   Ht 5' 2\" (1.575 m)   Wt 176 lb 6.4 oz (80 kg)   SpO2 95%   BMI 32.26 kg/m²   GENERAL: Alert, oriented x 3, not in acute distress. HEENT: PERRLA; EOMI. Oropharynx clear. LUNGS: Good air entry bilaterally. No wheezing, crackles or ronchi. CARDIOVASCULAR: Regular rate. No murmurs, rubs or gallops. EXTREMITIES: Without clubbing, cyanosis, or edema. Lab Results   Component Value Date    WBC 9.7 06/13/2022    HGB 12.4 06/13/2022    HCT 40.3 06/13/2022    MCV 78.4 (L) 06/13/2022     06/13/2022     Lab Results   Component Value Date     06/13/2022    K 4.7 06/13/2022     06/13/2022    CO2 26 06/13/2022    BUN 17 06/13/2022    CREATININE 0.9 06/13/2022    GLUCOSE 107 (H) 06/13/2022    CALCIUM 9.9 06/13/2022    PROT 7.1 06/13/2022    LABALBU 4.1 06/13/2022    BILITOT 0.3 06/13/2022    ALKPHOS 64 06/13/2022    AST 19 06/13/2022    ALT 19 06/13/2022    LABGLOM 60 06/13/2022    GFRAA >60 06/13/2022     Lab Results   Component Value Date    DDIMER <200 06/13/2022     Impression/Plan:  78 y. o. female with Hx of HTN, DMII, lymphedema, multiple prior DVT who was treated with short term anticoagulation (Coumadin), was not currently taking, who presented with bilateral massive PE, left subclavian artery thrombus, and SMA thrombus. She was started on a heparin drip and then taken to OR by [facility] for SMA embolectomy, IVC filter, and left arm embolectomy.     Hypercoag work-up:  D-Dimer 4531  Homocysteine 4.3 (0-15)    KHRIS      Negative  DRVVT Negative  Cardiolipin antibodies:                                          IgG <10 (<14), IgM: 11 (<12) IgA: <10 (<11)  Beta-2 Glyco 1 IgA <=20 JAVIER 19      AT-III Activity 64% (%)- may be low in acute setting. 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 No bleeding noted. Dr. Erich Nettles consulted to evaluate the liver lesion on CTA. CTA triphasic liver consistent with hepatic cysts. 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 Anti-thrombin III ordered and pending  D-Dimer <200 on 06/13/2022  Continue Eliquis 5 mg po bid  RTC 3 months with labs (D-Dimer).      Gabby Poole MD   6/17/2022

## 2022-06-19 LAB — AT-III ACTIVITY: 129 % ACTIVITY (ref 83–121)

## 2022-06-20 ENCOUNTER — TELEPHONE (OUTPATIENT)
Dept: INFUSION THERAPY | Age: 82
End: 2022-06-20

## 2022-06-23 ENCOUNTER — TELEPHONE (OUTPATIENT)
Dept: INFUSION THERAPY | Age: 82
End: 2022-06-23

## 2022-06-23 NOTE — TELEPHONE ENCOUNTER
Oracio Bowie from [address] [place] [address] returned call-Adwoa stated that the pt was called two times with no reply and mailed a letter with no reply in attempt to schedule an appointment-Pts home address and 2 contact phone numbers were verified with Mitch Bernstein was given pts keegan Mckeon's contact number in attempt to reach pt-Adwoa will forward son's number to [place] to get pt scheduled-JATIN [place] RN

## 2022-07-10 NOTE — PROGRESS NOTES
Thank you! Thank you for allowing me to care for you in the emergency department. It is my goal to provide you with excellent care. If you have not received excellent quality care, please ask to speak to the nurse manager. Please fill out the survey that will come to you by mail or email since we listen to your feedback! Below you will find a list of your tests from today's visit. Should you have any questions, please do not hesitate to call the emergency department. Labs  Recent Results (from the past 12 hour(s))   CBC WITH AUTOMATED DIFF    Collection Time: 07/09/22  9:59 PM   Result Value Ref Range    WBC 7.6 3.6 - 11.0 K/uL    RBC 3.90 3.80 - 5.20 M/uL    HGB 12.9 11.5 - 16.0 g/dL    HCT 38.8 35.0 - 47.0 %    MCV 99.5 (H) 80.0 - 99.0 FL    MCH 33.1 26.0 - 34.0 PG    MCHC 33.2 30.0 - 36.5 g/dL    RDW 11.9 11.5 - 14.5 %    PLATELET 800 691 - 511 K/uL    MPV 11.6 8.9 - 12.9 FL    NRBC 0.0 0.0  WBC    ABSOLUTE NRBC 0.00 0.00 - 0.01 K/uL    NEUTROPHILS 41 32 - 75 %    LYMPHOCYTES 45 12 - 49 %    MONOCYTES 10 5 - 13 %    EOSINOPHILS 3 0 - 7 %    BASOPHILS 1 0 - 1 %    IMMATURE GRANULOCYTES 0 0 - 0.5 %    ABS. NEUTROPHILS 3.1 1.8 - 8.0 K/UL    ABS. LYMPHOCYTES 3.4 0.8 - 3.5 K/UL    ABS. MONOCYTES 0.8 0.0 - 1.0 K/UL    ABS. EOSINOPHILS 0.2 0.0 - 0.4 K/UL    ABS. BASOPHILS 0.1 0.0 - 0.1 K/UL    ABS. IMM.  GRANS. 0.0 0.00 - 0.04 K/UL    DF AUTOMATED     METABOLIC PANEL, COMPREHENSIVE    Collection Time: 07/09/22  9:59 PM   Result Value Ref Range    Sodium 139 136 - 145 mmol/L    Potassium 4.4 3.5 - 5.1 mmol/L    Chloride 110 (H) 97 - 108 mmol/L    CO2 23 21 - 32 mmol/L    Anion gap 6 5 - 15 mmol/L    Glucose 96 65 - 100 mg/dL    BUN 24 (H) 6 - 20 mg/dL    Creatinine 1.18 (H) 0.55 - 1.02 mg/dL    BUN/Creatinine ratio 20 12 - 20      GFR est AA 53 (L) >60 ml/min/1.73m2    GFR est non-AA 43 (L) >60 ml/min/1.73m2    Calcium 9.0 8.5 - 10.1 mg/dL    Bilirubin, total 0.4 0.2 - 1.0 mg/dL    AST (SGOT) 21 15 - 37 Toribio Krishnamurthy MD FACP  Internal medicine  Progress notes      CHIEF COMPLAINT: Back pain nausea      HISTORY OF PRESENT ILLNESS:    11/2022  78year-old woman seen in the emergency room at main [facility] earlier this morning  Spoke with the ER physician at the time of admission  Spoke with vascular surgery  Daughter was at bedside  Data reviewed in detail  Patient was discharged to subacute rehab yesterday after she underwent IVC filter placement, mesenteric arterial embolectomy, and left subclavian embolectomy  Patient required Riley catheter postoperatively which was removed subsequently  She was at the [facility] for about 5 hours when she developed nausea back pain and suprapubic pain  Urinalysis suggestive of UTI  Abnormal CT of the abdomen was reviewed  Vascular surgery felt it was a small hematoma not to be concerned about  She is being admitted for further management  3/2/2022  Patient was seen on the floor earlier this morning  She states she is feeling much better  Nausea resolved  Oral intake adequate  On 2 L of oxygen nasal cannula  Tolerating medications  3/3/2022  She was seen on the floor earlier this morning  Remains a stable  Oral intake excellent  On minimal amount of oxygen through nasal cannula 1 L/min  Tolerating medications  No further nausea or emesis  Urine culture showing mixed organisms  Currently on Rocephin IV    Past Medical History:    Past Medical History:   Diagnosis Date    Anxiety     Arthritis     osteoarthritis    Depression     Diverticulitis     Dupuytren's contracture     Hyperlipidemia     Hypertension     Hypothyroidism     Lymphedema     Pre-diabetes        Past Surgical History:    Past Surgical History:   Procedure Laterality Date    ABDOMINAL AORTIC ANEURYSM REPAIR N/A 2/19/2022    EXPLORATORY LAPAROTOMY, MESENTERIC ARTERY EMBOLECTOMY performed by Betsy Anderson MD at [facility] [place] Left 2/19/2022    LEFT ARM  EMBOLECTOMY performed by Nathanael Mckeon MD at [address] [facility] Right 2/19/2022    VENA CAVA FILTER INSERTION performed by Nathanael Mckeon MD at [address]       Medications Prior to Admission:    Medications Prior to Admission: apixaban (ELIQUIS) 5 MG TABS tablet, Take 1 tablet by mouth 2 times daily  gabapentin (NEURONTIN) 300 MG capsule, Take 1 capsule by mouth 3 times daily for 30 days. DULoxetine (CYMBALTA) 20 MG extended release capsule, Take 1 capsule by mouth every other day  polyethylene glycol (GLYCOLAX) 17 g packet, Take 17 g by mouth daily  furosemide (LASIX) 20 MG tablet, Take 1 tablet by mouth daily  potassium chloride (KLOR-CON M) 20 MEQ extended release tablet, Take 1 tablet by mouth daily  metFORMIN (GLUCOPHAGE) 500 MG tablet, Take 1 tablet by mouth 2 times daily (with meals)  losartan-hydrochlorothiazide (HYZAAR) 100-25 MG per tablet, Take 1 tablet by mouth daily  atorvastatin (LIPITOR) 20 MG tablet, Take 1 tablet by mouth daily  Multiple Vitamins-Minerals (THERAPEUTIC MULTIVITAMIN-MINERALS) tablet, Take 1 tablet by mouth daily  calcium carbonate (OSCAL) 500 MG TABS tablet, Take 500 mg by mouth daily Indications: taking 3 timew a week     Allergies:    Patient has no known allergies. Social History:    reports that she has never smoked. She has never used smokeless tobacco. She reports that she does not drink alcohol. Family History:   family history is not on file. REVIEW OF SYSTEMS:  As above in the HPI, otherwise negative    PHYSICAL EXAM:    Vitals:  /62   Pulse 76   Temp 97.7 °F (36.5 °C) (Temporal)   Resp 18   Ht 5' 2\" (1.575 m)   Wt 194 lb (88 kg)   SpO2 96%   BMI 35.48 kg/m²     General:  Awake, alert, oriented X 3. Well developed, well nourished, well groomed. In mild distress  HEENT:  Normocephalic, atraumatic. Pupils equal, round, reactive to light. No scleral icterus. No conjunctival injection. .  No nasal discharge.   Neck:  Supple no U/L    ALT (SGPT) 19 12 - 78 U/L    Alk. phosphatase 63 45 - 117 U/L    Protein, total 7.2 6.4 - 8.2 g/dL    Albumin 3.6 3.5 - 5.0 g/dL    Globulin 3.6 2.0 - 4.0 g/dL    A-G Ratio 1.0 (L) 1.1 - 2.2     URINALYSIS W/MICROSCOPIC    Collection Time: 07/09/22 10:00 PM   Result Value Ref Range    Color Yellow/Straw      Appearance Clear Clear      Specific gravity 1.013 1.003 - 1.030      pH (UA) 5.0 5.0 - 8.0      Protein Negative Negative mg/dL    Glucose Negative Negative mg/dL    Ketone Negative Negative mg/dL    Bilirubin Negative Negative      Blood Moderate (A) Negative      Urobilinogen 0.1 0.1 - 1.0 EU/dL    Nitrites Negative Negative      Leukocyte Esterase Trace (A) Negative      WBC 0-4 0 - 4 /hpf    RBC 0-5 0 - 5 /hpf    Bacteria 1+ (A) Negative /hpf    Mucus Trace /lpf       Radiologic Studies  No orders to display     CT Results  (Last 48 hours)      None          CXR Results  (Last 48 hours)      None          ------------------------------------------------------------------------------------------------------------  The exam and treatment you received in the Emergency Department were for an urgent problem and are not intended as complete care. It is important that you follow-up with a doctor, nurse practitioner, or physician assistant to:  (1) confirm your diagnosis,  (2) re-evaluation of changes in your illness and treatment, and  (3) for ongoing care. Please take your discharge instructions with you when you go to your follow-up appointment. If you have any problem arranging a follow-up appointment, contact the Emergency Department. If your symptoms become worse or you do not improve as expected and you are unable to reach your health care provider, please return to the Emergency Department. We are available 24 hours a day. If a prescription has been provided, please have it filled as soon as possible to prevent a delay in treatment.  If you have any questions or reservations about taking the adenopathy  Heart:  RRR, no murmurs, gallops, rubs  Lungs:  CTA bilaterally, bilat symmetrical expansion, no wheeze, rales, or rhonchi  Abdomen:   Bowel sounds present, soft, suprapubic tenderness noted, no masses, no organomegaly, no peritoneal signs  Abdominal wall incision looks clean and dry with staples intact  Extremities:  No clubbing, cyanosis, or edema  Left upper extremity incision is healing well  Skin:  Warm and dry, no open lesions or rash  Neuro:  Cranial nerves 2-12 intact, no focal deficits  Breast: deferred  Rectal: deferred  Genitalia:  deferred    LABS: Data reviewed in detail      ASSESSMENT:    Acute cystitis, catheterization associated  Recent IVC filter placement for pulmonary emboli  Recent left subclavian arterial embolectomy  Recent mesenteric arterial embolectomy  Thrombophilia disorder  Essential hypertension  Type 2 diabetes mellitus  Patient Active Problem List   Diagnosis    Essential hypertension, benign    Lymphedema    Osteoarthritis    Hyperlipidemia    Anxiety    Acute mesenteric arterial occlusion (HCC)    Acute saddle pulmonary embolism with acute cor pulmonale (HCC)    Occlusion of left subclavian artery    Limb ischemia    Multiple pulmonary emboli (HCC)    Back pain           PLAN:  Discharge back to subacute rehab    Ambika Stroud MD  1:44 PM  3/3/2022 medication due to side effects or interactions with other medications, please call your primary care provider or contact the ER.

## 2022-09-13 ENCOUNTER — HOSPITAL ENCOUNTER (OUTPATIENT)
Age: 82
Discharge: HOME | End: 2022-09-13
Payer: MEDICARE

## 2022-09-13 DIAGNOSIS — I26.99 MULTIPLE PULMONARY EMBOLI (HCC): ICD-10-CM

## 2022-09-13 LAB
ALBUMIN SERPL-MCNC: 4 G/DL (ref 3.5–5.2)
ALP BLD-CCNC: 64 U/L (ref 35–104)
ALT SERPL-CCNC: 21 U/L (ref 0–32)
ANION GAP SERPL CALCULATED.3IONS-SCNC: 7 MMOL/L (ref 7–16)
AST SERPL-CCNC: 24 U/L (ref 0–31)
BASOPHILS ABSOLUTE: 0.04 E9/L (ref 0–0.2)
BASOPHILS RELATIVE PERCENT: 0.4 % (ref 0–2)
BILIRUB SERPL-MCNC: <0.2 MG/DL (ref 0–1.2)
BUN BLDV-MCNC: 20 MG/DL (ref 6–23)
CALCIUM SERPL-MCNC: 10.1 MG/DL (ref 8.6–10.2)
CHLORIDE BLD-SCNC: 103 MMOL/L (ref 98–107)
CO2: 31 MMOL/L (ref 22–29)
CREAT SERPL-MCNC: 0.9 MG/DL (ref 0.5–1)
D DIMER: <200 NG/ML DDU
EOSINOPHILS ABSOLUTE: 0.13 E9/L (ref 0.05–0.5)
EOSINOPHILS RELATIVE PERCENT: 1.3 % (ref 0–6)
GFR AFRICAN AMERICAN: >60
GFR NON-AFRICAN AMERICAN: 60 ML/MIN/1.73
GLUCOSE BLD-MCNC: 108 MG/DL (ref 74–99)
HCT VFR BLD CALC: 39.6 % (ref 34–48)
HEMOGLOBIN: 12 G/DL (ref 11.5–15.5)
IMMATURE GRANULOCYTES #: 0.05 E9/L
IMMATURE GRANULOCYTES %: 0.5 % (ref 0–5)
LYMPHOCYTES ABSOLUTE: 2.8 E9/L (ref 1.5–4)
LYMPHOCYTES RELATIVE PERCENT: 28.8 % (ref 20–42)
MCH RBC QN AUTO: 24.3 PG (ref 26–35)
MCHC RBC AUTO-ENTMCNC: 30.3 % (ref 32–34.5)
MCV RBC AUTO: 80.2 FL (ref 80–99.9)
MONOCYTES ABSOLUTE: 0.57 E9/L (ref 0.1–0.95)
MONOCYTES RELATIVE PERCENT: 5.9 % (ref 2–12)
NEUTROPHILS ABSOLUTE: 6.13 E9/L (ref 1.8–7.3)
NEUTROPHILS RELATIVE PERCENT: 63.1 % (ref 43–80)
PDW BLD-RTO: 15.2 FL (ref 11.5–15)
PLATELET # BLD: 333 E9/L (ref 130–450)
PMV BLD AUTO: 9.6 FL (ref 7–12)
POTASSIUM SERPL-SCNC: 4.8 MMOL/L (ref 3.5–5)
RBC # BLD: 4.94 E12/L (ref 3.5–5.5)
SODIUM BLD-SCNC: 141 MMOL/L (ref 132–146)
TOTAL PROTEIN: 7 G/DL (ref 6.4–8.3)
WBC # BLD: 9.7 E9/L (ref 4.5–11.5)

## 2022-09-13 PROCEDURE — 80053 COMPREHEN METABOLIC PANEL: CPT

## 2022-09-13 PROCEDURE — 36415 COLL VENOUS BLD VENIPUNCTURE: CPT

## 2022-09-13 PROCEDURE — 85378 FIBRIN DEGRADE SEMIQUANT: CPT

## 2022-09-13 PROCEDURE — 85025 COMPLETE CBC W/AUTO DIFF WBC: CPT

## 2022-09-13 PROCEDURE — 85300 ANTITHROMBIN III ACTIVITY: CPT

## 2022-09-16 ENCOUNTER — HOSPITAL ENCOUNTER (OUTPATIENT)
Dept: INFUSION THERAPY | Age: 82
Discharge: HOME | End: 2022-09-16

## 2022-09-16 ENCOUNTER — OFFICE VISIT (OUTPATIENT)
Dept: ONCOLOGY | Age: 82
End: 2022-09-16
Payer: MEDICARE

## 2022-09-16 VITALS
OXYGEN SATURATION: 98 % | RESPIRATION RATE: 20 BRPM | SYSTOLIC BLOOD PRESSURE: 143 MMHG | HEART RATE: 91 BPM | DIASTOLIC BLOOD PRESSURE: 81 MMHG | BODY MASS INDEX: 33.75 KG/M2 | HEIGHT: 62 IN | WEIGHT: 183.4 LBS

## 2022-09-16 DIAGNOSIS — I26.02 ACUTE SADDLE PULMONARY EMBOLISM WITH ACUTE COR PULMONALE (HCC): Primary | ICD-10-CM

## 2022-09-16 DIAGNOSIS — I26.99 MULTIPLE PULMONARY EMBOLI (HCC): Primary | ICD-10-CM

## 2022-09-16 LAB — AT-III ACTIVITY: 118 % ACTIVITY (ref 83–121)

## 2022-09-16 PROCEDURE — 99213 OFFICE O/P EST LOW 20 MIN: CPT | Performed by: INTERNAL MEDICINE

## 2022-09-16 PROCEDURE — 1123F ACP DISCUSS/DSCN MKR DOCD: CPT | Performed by: INTERNAL MEDICINE

## 2022-09-16 PROCEDURE — 99212 OFFICE O/P EST SF 10 MIN: CPT

## 2022-09-16 RX ORDER — GABAPENTIN 100 MG/1
200 CAPSULE ORAL DAILY
COMMUNITY

## 2022-09-16 NOTE — PROGRESS NOTES
Department of [facility]  Attending Clinic Note    Reason for Visit: Follow-up on a patient with Hx of DVT/PE    PCP:  Madisyn Kaur MD    History of Present Illness:  78 y.o. female with Hx of HTN, DMII, lymphedema, multiple prior DVT who was treated with short term anticoagulation (Coumadin), was not currently taking, who presented with bilateral massive PE, left subclavian artery thrombus, and SMA thrombus. She was started on a heparin drip and then taken to OR by Vascular for SMA embolectomy, IVC filter, and left arm embolectomy. Hypercoag work-up:  D-Dimer 4531  Homocysteine 4.3 (0-15)    KHRIS      Negative  DRVVT Negative  Cardiolipin antibodies:                                         IgG <10 (<14), IgM: 11 (<12) IgA: <10 (<11)  Beta-2 Glyco 1 IgA <=20 JAVIER 19      AT-III Activity 64% (%)- may be low in acute setting. 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 No bleeding noted. Dr. Truman Vizcaino consulted to evaluate the liver lesion on CTA. CTA triphasic liver consistent with hepatic cysts. 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 Fair appetite and energy level. No bleeding noted. Tolerating Eliquis well. Review of Systems;  CONSTITUTIONAL: No fever, chills. Fair appetite and energy level. ENMT: Eyes: No diplopia; Nose: No epistaxis. Mouth: No sore throat.   RESPIRATORY: No hemoptysis, shortness of breath, cough. CARDIOVASCULAR: No chest pain, palpitations. GASTROINTESTINAL: No nausea/vomiting, abdominal pain  GENITOURINARY: No dysuria, urinary frequency, hematuria. NEURO: No syncope, presyncope, headache. Remainder:  ROS NEGATIVE    Past Medical History:      Diagnosis Date    Anxiety     Arthritis     osteoarthritis    Depression     Diverticulitis     Dupuytren's contracture     History of UTI     Hyperlipidemia     Hypertension     Hypothyroidism     Lymphedema     Pre-diabetes      Medications:  Reviewed and reconciled. Allergies:  No Known Allergies    Physical Exam:  BP (!) 143/81   Pulse 91   Resp 20   Ht 5' 2\" (1.575 m)   Wt 183 lb 6.4 oz (83.2 kg)   SpO2 98%   BMI 33.54 kg/m²   GENERAL: Alert, oriented x 3, not in acute distress. HEENT: PERRLA; EOMI. Oropharynx clear. LUNGS: Good air entry bilaterally. No wheezing, crackles or ronchi. CARDIOVASCULAR: Regular rate. No murmurs, rubs or gallops. EXTREMITIES: Without clubbing, cyanosis, or edema. Lab Results   Component Value Date    WBC 9.7 09/13/2022    HGB 12.0 09/13/2022    HCT 39.6 09/13/2022    MCV 80.2 09/13/2022     09/13/2022     Lab Results   Component Value Date     09/13/2022    K 4.8 09/13/2022     09/13/2022    CO2 31 (H) 09/13/2022    BUN 20 09/13/2022    CREATININE 0.9 09/13/2022    GLUCOSE 108 (H) 09/13/2022    CALCIUM 10.1 09/13/2022    PROT 7.0 09/13/2022    LABALBU 4.0 09/13/2022    BILITOT <0.2 09/13/2022    ALKPHOS 64 09/13/2022    AST 24 09/13/2022    ALT 21 09/13/2022    LABGLOM 60 09/13/2022    GFRAA >60 09/13/2022     Lab Results   Component Value Date    DDIMER <200 09/13/2022     Impression/Plan:  78 y.o. female with Hx of HTN, DMII, lymphedema, multiple prior DVT who was treated with short term anticoagulation (Coumadin), was not currently taking, who presented with bilateral massive PE, left subclavian artery thrombus, and SMA thrombus.    She was started on a heparin drip and then taken to OR by Vascular for SMA embolectomy, IVC filter, and left arm embolectomy. Hypercoag work-up:  D-Dimer 4531  Homocysteine 4.3 (0-15)    KHRIS      Negative  DRVVT Negative  Cardiolipin antibodies:                                          IgG <10 (<14), IgM: 11 (<12) IgA: <10 (<11)  Beta-2 Glyco 1 IgA <=20 JAVIER 19      AT-III Activity 64% (%)- may be low in acute setting. 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 No bleeding noted. Dr. Brandyn Brooke consulted to evaluate the liver lesion on CTA. CTA triphasic liver consistent with hepatic cysts. 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 D-Dimer <200 on 06/13/2022  Anti-Thrombin  on 06/17/2022  D-Dimer <200 on 09/13/2022  Anti-Thrombin  on 09/13/2022  Continue Eliquis 5 mg po bid  RTC 3 months with labs (D-Dimer).      Navin Buitrago MD   9/16/2022

## 2022-12-09 ENCOUNTER — HOSPITAL ENCOUNTER (OUTPATIENT)
Age: 82
Discharge: HOME | End: 2022-12-09
Payer: MEDICARE

## 2022-12-09 DIAGNOSIS — I26.02 ACUTE SADDLE PULMONARY EMBOLISM WITH ACUTE COR PULMONALE (HCC): ICD-10-CM

## 2022-12-09 DIAGNOSIS — I26.99 MULTIPLE PULMONARY EMBOLI (HCC): ICD-10-CM

## 2022-12-09 LAB
ALBUMIN SERPL-MCNC: 3.7 G/DL (ref 3.5–5.2)
ALP BLD-CCNC: 60 U/L (ref 35–104)
ALT SERPL-CCNC: 15 U/L (ref 0–32)
ANION GAP SERPL CALCULATED.3IONS-SCNC: 6 MMOL/L (ref 7–16)
AST SERPL-CCNC: 15 U/L (ref 0–31)
BASOPHILS ABSOLUTE: 0.05 E9/L (ref 0–0.2)
BASOPHILS RELATIVE PERCENT: 0.7 % (ref 0–2)
BILIRUB SERPL-MCNC: 0.4 MG/DL (ref 0–1.2)
BUN BLDV-MCNC: 20 MG/DL (ref 6–23)
CALCIUM SERPL-MCNC: 9.4 MG/DL (ref 8.6–10.2)
CHLORIDE BLD-SCNC: 104 MMOL/L (ref 98–107)
CO2: 31 MMOL/L (ref 22–29)
CREAT SERPL-MCNC: 0.9 MG/DL (ref 0.5–1)
D DIMER: <200 NG/ML DDU
EOSINOPHILS ABSOLUTE: 0.12 E9/L (ref 0.05–0.5)
EOSINOPHILS RELATIVE PERCENT: 1.6 % (ref 0–6)
GFR SERPL CREATININE-BSD FRML MDRD: >60 ML/MIN/1.73
GLUCOSE BLD-MCNC: 106 MG/DL (ref 74–99)
HCT VFR BLD CALC: 38.4 % (ref 34–48)
HEMOGLOBIN: 11.8 G/DL (ref 11.5–15.5)
IMMATURE GRANULOCYTES #: 0.03 E9/L
IMMATURE GRANULOCYTES %: 0.4 % (ref 0–5)
LYMPHOCYTES ABSOLUTE: 2.21 E9/L (ref 1.5–4)
LYMPHOCYTES RELATIVE PERCENT: 30.2 % (ref 20–42)
MCH RBC QN AUTO: 24.8 PG (ref 26–35)
MCHC RBC AUTO-ENTMCNC: 30.7 % (ref 32–34.5)
MCV RBC AUTO: 80.7 FL (ref 80–99.9)
MONOCYTES ABSOLUTE: 0.49 E9/L (ref 0.1–0.95)
MONOCYTES RELATIVE PERCENT: 6.7 % (ref 2–12)
NEUTROPHILS ABSOLUTE: 4.43 E9/L (ref 1.8–7.3)
NEUTROPHILS RELATIVE PERCENT: 60.4 % (ref 43–80)
PDW BLD-RTO: 14.6 FL (ref 11.5–15)
PLATELET # BLD: 313 E9/L (ref 130–450)
PMV BLD AUTO: 9.5 FL (ref 7–12)
POTASSIUM SERPL-SCNC: 4.7 MMOL/L (ref 3.5–5)
RBC # BLD: 4.76 E12/L (ref 3.5–5.5)
SODIUM BLD-SCNC: 141 MMOL/L (ref 132–146)
TOTAL PROTEIN: 6.1 G/DL (ref 6.4–8.3)
WBC # BLD: 7.3 E9/L (ref 4.5–11.5)

## 2022-12-09 PROCEDURE — 85025 COMPLETE CBC W/AUTO DIFF WBC: CPT

## 2022-12-09 PROCEDURE — 80053 COMPREHEN METABOLIC PANEL: CPT

## 2022-12-09 PROCEDURE — 85300 ANTITHROMBIN III ACTIVITY: CPT

## 2022-12-09 PROCEDURE — 85378 FIBRIN DEGRADE SEMIQUANT: CPT

## 2022-12-09 PROCEDURE — 36415 COLL VENOUS BLD VENIPUNCTURE: CPT

## 2022-12-10 LAB — AT-III ACTIVITY: 106 % ACTIVITY (ref 83–121)

## 2022-12-16 ENCOUNTER — HOSPITAL ENCOUNTER (OUTPATIENT)
Dept: INFUSION THERAPY | Age: 82
Discharge: HOME | End: 2022-12-16

## 2022-12-16 ENCOUNTER — OFFICE VISIT (OUTPATIENT)
Dept: ONCOLOGY | Age: 82
End: 2022-12-16
Payer: MEDICARE

## 2022-12-16 VITALS
TEMPERATURE: 96.8 F | DIASTOLIC BLOOD PRESSURE: 78 MMHG | WEIGHT: 188 LBS | SYSTOLIC BLOOD PRESSURE: 158 MMHG | HEART RATE: 72 BPM | OXYGEN SATURATION: 96 % | BODY MASS INDEX: 34.39 KG/M2

## 2022-12-16 DIAGNOSIS — I26.02 ACUTE SADDLE PULMONARY EMBOLISM WITH ACUTE COR PULMONALE (HCC): Primary | ICD-10-CM

## 2022-12-16 PROCEDURE — 3078F DIAST BP <80 MM HG: CPT | Performed by: INTERNAL MEDICINE

## 2022-12-16 PROCEDURE — 3074F SYST BP LT 130 MM HG: CPT | Performed by: INTERNAL MEDICINE

## 2022-12-16 PROCEDURE — 99213 OFFICE O/P EST LOW 20 MIN: CPT | Performed by: INTERNAL MEDICINE

## 2022-12-16 PROCEDURE — 1123F ACP DISCUSS/DSCN MKR DOCD: CPT | Performed by: INTERNAL MEDICINE

## 2022-12-16 NOTE — PROGRESS NOTES
Department of [facility]  Attending Clinic Note    Reason for Visit: Follow-up on a patient with Hx of DVT/PE    PCP:  Lynsey Thorpe MD    History of Present Illness:  78 y.o. female with Hx of HTN, DMII, lymphedema, multiple prior DVT who was treated with short term anticoagulation (Coumadin), was not currently taking, who presented with bilateral massive PE, left subclavian artery thrombus, and SMA thrombus. She was started on a heparin drip and then taken to OR by Vascular for SMA embolectomy, IVC filter, and left arm embolectomy. Hypercoag work-up:  D-Dimer 4531  Homocysteine 4.3 (0-15)    KHRIS      Negative  DRVVT Negative  Cardiolipin antibodies:                                         IgG <10 (<14), IgM: 11 (<12) IgA: <10 (<11)  Beta-2 Glyco 1 IgA <=20 JAVIER 19      AT-III Activity 64% (%)- may be low in acute setting. 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 No bleeding noted. Dr. Deb Mas consulted to evaluate the liver lesion on CTA. CTA triphasic liver consistent with hepatic cysts. 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 Fair appetite and energy level. No bleeding noted. Tolerating Eliquis well. Review of Systems;  CONSTITUTIONAL: No fever, chills. Fair appetite and energy level. ENMT: Eyes: No diplopia; Nose: No epistaxis. Mouth: No sore throat.   RESPIRATORY: No hemoptysis, shortness of breath, cough. CARDIOVASCULAR: No chest pain, palpitations. GASTROINTESTINAL: No nausea/vomiting, abdominal pain  GENITOURINARY: No dysuria, urinary frequency, hematuria. NEURO: No syncope, presyncope, headache. Remainder:  ROS NEGATIVE    Past Medical History:      Diagnosis Date    Anxiety     Arthritis     osteoarthritis    Depression     Diverticulitis     Dupuytren's contracture     History of UTI     Hyperlipidemia     Hypertension     Hypothyroidism     Lymphedema     Pre-diabetes      Medications:  Reviewed and reconciled. Allergies:  No Known Allergies    Physical Exam:  BP (!) 199/85   Pulse 72   Temp 96.8 °F (36 °C)   Wt 188 lb (85.3 kg)   SpO2 96%   BMI 34.39 kg/m²   GENERAL: Alert, oriented x 3, not in acute distress. HEENT: PERRLA; EOMI. Oropharynx clear. LUNGS: Good air entry bilaterally. No wheezing, crackles or ronchi. CARDIOVASCULAR: Regular rate. No murmurs, rubs or gallops. EXTREMITIES: Without clubbing, cyanosis, or edema. Lab Results   Component Value Date    WBC 7.3 12/09/2022    HGB 11.8 12/09/2022    HCT 38.4 12/09/2022    MCV 80.7 12/09/2022     12/09/2022     Lab Results   Component Value Date     12/09/2022    K 4.7 12/09/2022     12/09/2022    CO2 31 (H) 12/09/2022    BUN 20 12/09/2022    CREATININE 0.9 12/09/2022    GLUCOSE 106 (H) 12/09/2022    CALCIUM 9.4 12/09/2022    PROT 6.1 (L) 12/09/2022    LABALBU 3.7 12/09/2022    BILITOT 0.4 12/09/2022    ALKPHOS 60 12/09/2022    AST 15 12/09/2022    ALT 15 12/09/2022    LABGLOM >60 12/09/2022    GFRAA >60 09/13/2022     Lab Results   Component Value Date    DDIMER <200 12/09/2022     Impression/Plan:  78 y.o. female with Hx of HTN, DMII, lymphedema, multiple prior DVT who was treated with short term anticoagulation (Coumadin), was not currently taking, who presented with bilateral massive PE, left subclavian artery thrombus, and SMA thrombus.    She was started on a heparin drip and then taken to OR by Vascular for SMA embolectomy, IVC filter, and left arm embolectomy. Hypercoag work-up:  D-Dimer 4531  Homocysteine 4.3 (0-15)    KHRIS      Negative  DRVVT Negative  Cardiolipin antibodies:                                          IgG <10 (<14), IgM: 11 (<12) IgA: <10 (<11)  Beta-2 Glyco 1 IgA <=20 JAVIER 19      AT-III Activity 64% (%)- may be low in acute setting. 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 No bleeding noted. Dr. Brennan Estrella consulted to evaluate the liver lesion on CTA. CTA triphasic liver consistent with hepatic cysts. 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 D-Dimer <200 on 06/13/2022  Anti-Thrombin  on 06/17/2022  D-Dimer <200 on 09/13/2022  Anti-Thrombin  on 09/13/2022  D-Dimer <200 on 12/09/2022  Anti-Thrombin 106% (%)  Labs reviewed. Continue Eliquis 5 mg po bid. RTC 4 months with labs (D-Dimer).      Lorenzo Atkinson MD   12/16/2022

## 2023-04-26 ENCOUNTER — HOSPITAL ENCOUNTER (OUTPATIENT)
Age: 83
Discharge: HOME | End: 2023-04-26
Payer: MEDICARE

## 2023-04-26 DIAGNOSIS — I26.99 MULTIPLE PULMONARY EMBOLI (HCC): ICD-10-CM

## 2023-04-26 LAB
ALBUMIN SERPL-MCNC: 3.8 G/DL (ref 3.5–5.2)
ALP SERPL-CCNC: 71 U/L (ref 35–104)
ALT SERPL-CCNC: 17 U/L (ref 0–32)
ANION GAP SERPL CALCULATED.3IONS-SCNC: 7 MMOL/L (ref 7–16)
AST SERPL-CCNC: 17 U/L (ref 0–31)
BASOPHILS # BLD: 0.04 E9/L (ref 0–0.2)
BASOPHILS NFR BLD: 0.5 % (ref 0–2)
BILIRUB SERPL-MCNC: 0.3 MG/DL (ref 0–1.2)
BUN SERPL-MCNC: 24 MG/DL (ref 6–23)
CALCIUM SERPL-MCNC: 9.3 MG/DL (ref 8.6–10.2)
CHLORIDE SERPL-SCNC: 106 MMOL/L (ref 98–107)
CO2 SERPL-SCNC: 31 MMOL/L (ref 22–29)
CREAT SERPL-MCNC: 0.9 MG/DL (ref 0.5–1)
D DIMER: <200 NG/ML DDU
EOSINOPHIL # BLD: 0.13 E9/L (ref 0.05–0.5)
EOSINOPHIL NFR BLD: 1.6 % (ref 0–6)
ERYTHROCYTE [DISTWIDTH] IN BLOOD BY AUTOMATED COUNT: 15 FL (ref 11.5–15)
GLUCOSE SERPL-MCNC: 129 MG/DL (ref 74–99)
HCT VFR BLD AUTO: 38.5 % (ref 34–48)
HGB BLD-MCNC: 11.4 G/DL (ref 11.5–15.5)
IMM GRANULOCYTES # BLD: 0.03 E9/L
IMM GRANULOCYTES NFR BLD: 0.4 % (ref 0–5)
LYMPHOCYTES # BLD: 2.4 E9/L (ref 1.5–4)
LYMPHOCYTES NFR BLD: 29 % (ref 20–42)
MCH RBC QN AUTO: 24.4 PG (ref 26–35)
MCHC RBC AUTO-ENTMCNC: 29.6 % (ref 32–34.5)
MCV RBC AUTO: 82.3 FL (ref 80–99.9)
MONOCYTES # BLD: 0.55 E9/L (ref 0.1–0.95)
MONOCYTES NFR BLD: 6.6 % (ref 2–12)
NEUTROPHILS # BLD: 5.14 E9/L (ref 1.8–7.3)
NEUTS SEG NFR BLD: 61.9 % (ref 43–80)
PLATELET # BLD AUTO: 337 E9/L (ref 130–450)
PMV BLD AUTO: 9.4 FL (ref 7–12)
POTASSIUM SERPL-SCNC: 4.7 MMOL/L (ref 3.5–5)
PROT SERPL-MCNC: 6.5 G/DL (ref 6.4–8.3)
RBC # BLD AUTO: 4.68 E12/L (ref 3.5–5.5)
SODIUM SERPL-SCNC: 144 MMOL/L (ref 132–146)
WBC # BLD: 8.3 E9/L (ref 4.5–11.5)

## 2023-04-26 PROCEDURE — 85378 FIBRIN DEGRADE SEMIQUANT: CPT

## 2023-04-26 PROCEDURE — 85025 COMPLETE CBC W/AUTO DIFF WBC: CPT

## 2023-04-26 PROCEDURE — 36415 COLL VENOUS BLD VENIPUNCTURE: CPT

## 2023-04-26 PROCEDURE — 80053 COMPREHEN METABOLIC PANEL: CPT

## 2023-04-28 ENCOUNTER — HOSPITAL ENCOUNTER (OUTPATIENT)
Dept: INFUSION THERAPY | Age: 83
Discharge: HOME | End: 2023-04-28

## 2023-04-28 ENCOUNTER — OFFICE VISIT (OUTPATIENT)
Dept: ONCOLOGY | Age: 83
End: 2023-04-28
Payer: MEDICARE

## 2023-04-28 VITALS
HEART RATE: 70 BPM | SYSTOLIC BLOOD PRESSURE: 177 MMHG | OXYGEN SATURATION: 94 % | HEIGHT: 62 IN | TEMPERATURE: 97.1 F | WEIGHT: 194.4 LBS | DIASTOLIC BLOOD PRESSURE: 82 MMHG | BODY MASS INDEX: 35.77 KG/M2

## 2023-04-28 DIAGNOSIS — I26.02 ACUTE SADDLE PULMONARY EMBOLISM WITH ACUTE COR PULMONALE (HCC): Primary | ICD-10-CM

## 2023-04-28 PROCEDURE — 99213 OFFICE O/P EST LOW 20 MIN: CPT | Performed by: INTERNAL MEDICINE

## 2023-04-28 PROCEDURE — 3078F DIAST BP <80 MM HG: CPT | Performed by: INTERNAL MEDICINE

## 2023-04-28 PROCEDURE — 1123F ACP DISCUSS/DSCN MKR DOCD: CPT | Performed by: INTERNAL MEDICINE

## 2023-04-28 PROCEDURE — 3074F SYST BP LT 130 MM HG: CPT | Performed by: INTERNAL MEDICINE

## 2023-04-28 NOTE — PROGRESS NOTES
Department of [facility]  Attending Clinic Note    Reason for Visit: Follow-up on a patient with Hx of DVT/PE    PCP:  Amaury Freire MD    History of Present Illness:  78 y.o. female with Hx of HTN, DMII, lymphedema, multiple prior DVT who was treated with short term anticoagulation (Coumadin), was not currently taking, who presented with bilateral massive PE, left subclavian artery thrombus, and SMA thrombus. She was started on a heparin drip and then taken to OR by Vascular for SMA embolectomy, IVC filter, and left arm embolectomy. Hypercoag work-up:  D-Dimer 4531  Homocysteine 4.3 (0-15)    KHRIS      Negative  DRVVT Negative  Cardiolipin antibodies:                                         IgG <10 (<14), IgM: 11 (<12) IgA: <10 (<11)  Beta-2 Glyco 1 IgA <=20 JAVIER 19      AT-III Activity 64% (%)- may be low in acute setting. 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 No bleeding noted. Dr. Teodora Saeed consulted to evaluate the liver lesion on CTA. CTA triphasic liver consistent with hepatic cysts. 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 Fair appetite. Mild fatigue. No bleeding. Tolerating Eliquis well. Review of Systems;  CONSTITUTIONAL: No fever, chills. Fair appetite. Mild fatigue. ENMT: Eyes: No diplopia; Nose: No epistaxis. Mouth: No sore throat.   RESPIRATORY: No hemoptysis, shortness of breath,

## 2023-08-18 ENCOUNTER — HOSPITAL ENCOUNTER (OUTPATIENT)
Dept: ULTRASOUND IMAGING | Age: 83
End: 2023-08-18
Payer: MEDICARE

## 2023-08-18 ENCOUNTER — HOSPITAL ENCOUNTER (OUTPATIENT)
Age: 83
End: 2023-08-18
Payer: MEDICARE

## 2023-08-18 DIAGNOSIS — R60.0 LOCALIZED EDEMA: ICD-10-CM

## 2023-08-18 PROCEDURE — 93971 EXTREMITY STUDY: CPT

## 2023-10-26 DIAGNOSIS — I26.02 ACUTE SADDLE PULMONARY EMBOLISM WITH ACUTE COR PULMONALE (HCC): Primary | ICD-10-CM

## 2023-10-27 ENCOUNTER — HOSPITAL ENCOUNTER (OUTPATIENT)
Age: 83
Discharge: HOME | End: 2023-10-27
Payer: MEDICARE

## 2023-10-27 DIAGNOSIS — I26.02 ACUTE SADDLE PULMONARY EMBOLISM WITH ACUTE COR PULMONALE (HCC): ICD-10-CM

## 2023-10-27 LAB
ALBUMIN SERPL-MCNC: 4 G/DL (ref 3.5–5.2)
ALP SERPL-CCNC: 73 U/L (ref 35–104)
ALT SERPL-CCNC: 15 U/L (ref 0–32)
ANION GAP SERPL CALCULATED.3IONS-SCNC: 7 MMOL/L (ref 7–16)
AST SERPL-CCNC: 16 U/L (ref 0–31)
BASOPHILS # BLD: 0.04 K/UL (ref 0–0.2)
BASOPHILS NFR BLD: 1 % (ref 0–2)
BILIRUB SERPL-MCNC: 0.2 MG/DL (ref 0–1.2)
BUN SERPL-MCNC: 26 MG/DL (ref 6–23)
CALCIUM SERPL-MCNC: 10.1 MG/DL (ref 8.6–10.2)
CHLORIDE SERPL-SCNC: 103 MMOL/L (ref 98–107)
CO2 SERPL-SCNC: 32 MMOL/L (ref 22–29)
CREAT SERPL-MCNC: 0.9 MG/DL (ref 0.5–1)
D DIMER: 250 NG/ML DDU (ref 0–232)
EOSINOPHIL # BLD: 0.18 K/UL (ref 0.05–0.5)
EOSINOPHILS RELATIVE PERCENT: 2 % (ref 0–6)
ERYTHROCYTE [DISTWIDTH] IN BLOOD BY AUTOMATED COUNT: 15 % (ref 11.5–15)
GFR SERPL CREATININE-BSD FRML MDRD: >60 ML/MIN/1.73M2
GLUCOSE SERPL-MCNC: 108 MG/DL (ref 74–99)
HCT VFR BLD AUTO: 40.8 % (ref 34–48)
HGB BLD-MCNC: 12.4 G/DL (ref 11.5–15.5)
IMM GRANULOCYTES # BLD AUTO: 0.03 K/UL (ref 0–0.58)
IMM GRANULOCYTES NFR BLD: 0 % (ref 0–5)
LYMPHOCYTES NFR BLD: 2.2 K/UL (ref 1.5–4)
LYMPHOCYTES RELATIVE PERCENT: 28 % (ref 20–42)
MCH RBC QN AUTO: 24.9 PG (ref 26–35)
MCHC RBC AUTO-ENTMCNC: 30.4 G/DL (ref 32–34.5)
MCV RBC AUTO: 82.1 FL (ref 80–99.9)
MONOCYTES NFR BLD: 0.5 K/UL (ref 0.1–0.95)
MONOCYTES NFR BLD: 6 % (ref 2–12)
NEUTROPHILS NFR BLD: 63 % (ref 43–80)
NEUTS SEG NFR BLD: 4.96 K/UL (ref 1.8–7.3)
PLATELET # BLD AUTO: 314 K/UL (ref 130–450)
PMV BLD AUTO: 9.5 FL (ref 7–12)
POTASSIUM SERPL-SCNC: 4.8 MMOL/L (ref 3.5–5)
PROT SERPL-MCNC: 6.9 G/DL (ref 6.4–8.3)
RBC # BLD AUTO: 4.97 M/UL (ref 3.5–5.5)
SODIUM SERPL-SCNC: 142 MMOL/L (ref 132–146)
WBC OTHER # BLD: 7.9 K/UL (ref 4.5–11.5)

## 2023-10-27 PROCEDURE — 85025 COMPLETE CBC W/AUTO DIFF WBC: CPT

## 2023-10-27 PROCEDURE — 36415 COLL VENOUS BLD VENIPUNCTURE: CPT

## 2023-10-27 PROCEDURE — 85379 FIBRIN DEGRADATION QUANT: CPT

## 2023-10-27 PROCEDURE — 80053 COMPREHEN METABOLIC PANEL: CPT

## 2023-11-01 ENCOUNTER — OFFICE VISIT (OUTPATIENT)
Dept: ONCOLOGY | Age: 83
End: 2023-11-01
Payer: MEDICARE

## 2023-11-01 ENCOUNTER — HOSPITAL ENCOUNTER (OUTPATIENT)
Dept: INFUSION THERAPY | Age: 83
Discharge: HOME | End: 2023-11-01

## 2023-11-01 VITALS
SYSTOLIC BLOOD PRESSURE: 129 MMHG | TEMPERATURE: 97.8 F | HEIGHT: 62 IN | BODY MASS INDEX: 35.63 KG/M2 | WEIGHT: 193.6 LBS | DIASTOLIC BLOOD PRESSURE: 65 MMHG | OXYGEN SATURATION: 97 % | HEART RATE: 73 BPM

## 2023-11-01 DIAGNOSIS — I26.02 ACUTE SADDLE PULMONARY EMBOLISM WITH ACUTE COR PULMONALE (HCC): Primary | ICD-10-CM

## 2023-11-01 PROCEDURE — 3074F SYST BP LT 130 MM HG: CPT | Performed by: INTERNAL MEDICINE

## 2023-11-01 PROCEDURE — 99214 OFFICE O/P EST MOD 30 MIN: CPT | Performed by: INTERNAL MEDICINE

## 2023-11-01 PROCEDURE — 3078F DIAST BP <80 MM HG: CPT | Performed by: INTERNAL MEDICINE

## 2023-11-01 PROCEDURE — 1123F ACP DISCUSS/DSCN MKR DOCD: CPT | Performed by: INTERNAL MEDICINE

## 2023-11-01 NOTE — PROGRESS NOTES
Department of [facility]  Attending Clinic Note    Reason for Visit: Follow-up on a patient with Hx of DVT/PE    PCP:  Emiil Burnette MD    History of Present Illness:  78 y.o. female with Hx of HTN, DMII, lymphedema, multiple prior DVT who was treated with short term anticoagulation (Coumadin), was not currently taking, who presented with bilateral massive PE, left subclavian artery thrombus, and SMA thrombus. She was started on a heparin drip and then taken to OR by V[facility] for SMA embolectomy, IVC filter, and left arm embolectomy. Hypercoag work-up:  D-Dimer 4531  Homocysteine 4.3 (0-15)    KHRIS      Negative  DRVVT Negative  Cardiolipin antibodies:                                         IgG <10 (<14), IgM: 11 (<12) IgA: <10 (<11)  Beta-2 Glyco 1 IgA <=20 JAVIER 19      AT-III Activity 64% (%)- may be low in acute setting. 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 No bleeding noted. Dr. Elli Joseph consulted to evaluate the liver lesion on CTA. CTA triphasic liver consistent with hepatic cysts. 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 Whitney Olson, today 11/1/2023 was the first office visit for the patient with me. She is doing well overall, she denies any bleeding, she continues to be on Eliquis, no side effects, no recurrent clots. She is accompanied by her daughter.     Review of

## 2024-04-19 ENCOUNTER — HOSPITAL ENCOUNTER (OUTPATIENT)
Age: 84
Discharge: HOME | End: 2024-04-19
Payer: MEDICARE

## 2024-04-19 DIAGNOSIS — I26.02 ACUTE SADDLE PULMONARY EMBOLISM WITH ACUTE COR PULMONALE (HCC): ICD-10-CM

## 2024-04-19 LAB
ALBUMIN SERPL-MCNC: 4 G/DL (ref 3.5–5.2)
ALP SERPL-CCNC: 64 U/L (ref 35–104)
ALT SERPL-CCNC: 17 U/L (ref 0–32)
ANION GAP SERPL CALCULATED.3IONS-SCNC: 9 MMOL/L (ref 7–16)
AST SERPL-CCNC: 18 U/L (ref 0–31)
BASOPHILS # BLD: 0.05 K/UL (ref 0–0.2)
BASOPHILS NFR BLD: 1 % (ref 0–2)
BILIRUB SERPL-MCNC: 0.3 MG/DL (ref 0–1.2)
BUN SERPL-MCNC: 21 MG/DL (ref 6–23)
CALCIUM SERPL-MCNC: 9.7 MG/DL (ref 8.6–10.2)
CHLORIDE SERPL-SCNC: 105 MMOL/L (ref 98–107)
CHOLEST SERPL-MCNC: 177 MG/DL
CO2 SERPL-SCNC: 28 MMOL/L (ref 22–29)
CREAT SERPL-MCNC: 0.9 MG/DL (ref 0.5–1)
EOSINOPHIL # BLD: 0.14 K/UL (ref 0.05–0.5)
EOSINOPHILS RELATIVE PERCENT: 2 % (ref 0–6)
ERYTHROCYTE [DISTWIDTH] IN BLOOD BY AUTOMATED COUNT: 15 % (ref 11.5–15)
GFR SERPL CREATININE-BSD FRML MDRD: 67 ML/MIN/1.73M2
GLUCOSE SERPL-MCNC: 116 MG/DL (ref 74–99)
HCT VFR BLD AUTO: 41.1 % (ref 34–48)
HDLC SERPL-MCNC: 55 MG/DL
HGB BLD-MCNC: 12.3 G/DL (ref 11.5–15.5)
IMM GRANULOCYTES # BLD AUTO: 0.04 K/UL (ref 0–0.58)
IMM GRANULOCYTES NFR BLD: 1 % (ref 0–5)
LDLC SERPL CALC-MCNC: 99 MG/DL
LYMPHOCYTES NFR BLD: 2.32 K/UL (ref 1.5–4)
LYMPHOCYTES RELATIVE PERCENT: 28 % (ref 20–42)
MCH RBC QN AUTO: 24.9 PG (ref 26–35)
MCHC RBC AUTO-ENTMCNC: 29.9 G/DL (ref 32–34.5)
MCV RBC AUTO: 83.2 FL (ref 80–99.9)
MONOCYTES NFR BLD: 0.64 K/UL (ref 0.1–0.95)
MONOCYTES NFR BLD: 8 % (ref 2–12)
NEUTROPHILS NFR BLD: 62 % (ref 43–80)
NEUTS SEG NFR BLD: 5.16 K/UL (ref 1.8–7.3)
PLATELET # BLD AUTO: 319 K/UL (ref 130–450)
PMV BLD AUTO: 9.6 FL (ref 7–12)
POTASSIUM SERPL-SCNC: 4.9 MMOL/L (ref 3.5–5)
PROT SERPL-MCNC: 6.7 G/DL (ref 6.4–8.3)
RBC # BLD AUTO: 4.94 M/UL (ref 3.5–5.5)
SODIUM SERPL-SCNC: 142 MMOL/L (ref 132–146)
T4 FREE SERPL-MCNC: 0.9 NG/DL (ref 0.9–1.7)
TRIGL SERPL-MCNC: 115 MG/DL
TSH SERPL DL<=0.05 MIU/L-ACNC: 5.23 UIU/ML (ref 0.27–4.2)
VLDLC SERPL CALC-MCNC: 23 MG/DL
WBC OTHER # BLD: 8.4 K/UL (ref 4.5–11.5)

## 2024-04-19 PROCEDURE — 80061 LIPID PANEL: CPT

## 2024-04-19 PROCEDURE — 84443 ASSAY THYROID STIM HORMONE: CPT

## 2024-04-19 PROCEDURE — 85025 COMPLETE CBC W/AUTO DIFF WBC: CPT

## 2024-04-19 PROCEDURE — 85379 FIBRIN DEGRADATION QUANT: CPT

## 2024-04-19 PROCEDURE — 36415 COLL VENOUS BLD VENIPUNCTURE: CPT

## 2024-04-19 PROCEDURE — 84439 ASSAY OF FREE THYROXINE: CPT

## 2024-04-19 PROCEDURE — 80053 COMPREHEN METABOLIC PANEL: CPT

## 2024-04-23 ENCOUNTER — HOSPITAL ENCOUNTER (OUTPATIENT)
Age: 84
Discharge: HOME | End: 2024-04-23
Payer: MEDICARE

## 2024-04-23 DIAGNOSIS — I26.02 ACUTE SADDLE PULMONARY EMBOLISM WITH ACUTE COR PULMONALE (HCC): ICD-10-CM

## 2024-04-23 LAB — D DIMER: 251 NG/ML DDU (ref 0–232)

## 2024-04-23 PROCEDURE — 36415 COLL VENOUS BLD VENIPUNCTURE: CPT

## 2024-04-23 PROCEDURE — 85379 FIBRIN DEGRADATION QUANT: CPT

## 2024-05-01 ENCOUNTER — OFFICE VISIT (OUTPATIENT)
Dept: ONCOLOGY | Age: 84
End: 2024-05-01
Payer: MEDICARE

## 2024-05-01 ENCOUNTER — HOSPITAL ENCOUNTER (OUTPATIENT)
Dept: INFUSION THERAPY | Age: 84
Discharge: HOME | End: 2024-05-01

## 2024-05-01 VITALS
HEIGHT: 62 IN | OXYGEN SATURATION: 96 % | TEMPERATURE: 98.8 F | SYSTOLIC BLOOD PRESSURE: 149 MMHG | BODY MASS INDEX: 37.02 KG/M2 | HEART RATE: 67 BPM | WEIGHT: 201.2 LBS | DIASTOLIC BLOOD PRESSURE: 76 MMHG

## 2024-05-01 DIAGNOSIS — I26.02 ACUTE SADDLE PULMONARY EMBOLISM WITH ACUTE COR PULMONALE (HCC): Primary | ICD-10-CM

## 2024-05-01 PROCEDURE — 3077F SYST BP >= 140 MM HG: CPT | Performed by: INTERNAL MEDICINE

## 2024-05-01 PROCEDURE — 1123F ACP DISCUSS/DSCN MKR DOCD: CPT | Performed by: INTERNAL MEDICINE

## 2024-05-01 PROCEDURE — 99213 OFFICE O/P EST LOW 20 MIN: CPT | Performed by: INTERNAL MEDICINE

## 2024-05-01 PROCEDURE — 3078F DIAST BP <80 MM HG: CPT | Performed by: INTERNAL MEDICINE

## 2024-05-01 NOTE — PROGRESS NOTES
Pt declined AVS, wanted dr appointment card for next appointment date and time.  
Not on file   Physical Activity: Not on file   Stress: Not on file   Social Connections: Not on file   Intimate Partner Violence: Not on file   Housing Stability: Not on file     No family history on file.    Physical Exam:  BP (!) 149/76   Pulse 67   Temp 98.8 °F (37.1 °C)   Ht 1.575 m (5' 2\")   Wt 91.3 kg (201 lb 3.2 oz)   SpO2 96%   BMI 36.80 kg/m²   GENERAL: Alert, oriented x 3, not in acute distress.  HEENT: PERRLA; EOMI. Oropharynx clear.   LUNGS: Good air entry bilaterally. No wheezing, crackles or ronchi.   CARDIOVASCULAR: Regular rate. No murmurs, rubs or gallops.   ABD: NABS, soft, nontender, no splenomegaly.  EXTREMITIES: Without clubbing, cyanosis, or edema.     Lab Results   Component Value Date    WBC 8.4 04/19/2024    HGB 12.3 04/19/2024    HCT 41.1 04/19/2024    MCV 83.2 04/19/2024     04/19/2024     Lab Results   Component Value Date     04/19/2024    K 4.9 04/19/2024     04/19/2024    CO2 28 04/19/2024    BUN 21 04/19/2024    CREATININE 0.9 04/19/2024    GLUCOSE 116 (H) 04/19/2024    CALCIUM 9.7 04/19/2024    BILITOT 0.3 04/19/2024    ALKPHOS 64 04/19/2024    AST 18 04/19/2024    ALT 17 04/19/2024    LABGLOM 60 09/13/2022    GFRAA >60 09/13/2022     Lab Results   Component Value Date    DDIMER 251 (H) 04/23/2024     Impression/Plan:  82 y.o. female with Hx of HTN, DMII, lymphedema, multiple prior DVT who was treated with short term anticoagulation (Coumadin), was not currently taking, who presented with bilateral massive PE, left subclavian artery thrombus, and SMA thrombus.   She was started on a heparin drip and then taken to OR by [facility] for SMA embolectomy, IVC filter, and left arm embolectomy.     Hypercoag work-up:  D-Dimer 4531  Homocysteine 4.3 (0-15)    KHRIS      Negative  DRVVT Negative  Cardiolipin antibodies:                                          IgG <10 (<14), IgM: 11 (<12) IgA: <10 (<11)  Beta-2 Glyco 1 IgA <=20 JAVIER 19      AT-III Activity 64% (%)-

## 2024-06-17 ENCOUNTER — APPOINTMENT (OUTPATIENT)
Dept: CT IMAGING | Age: 84
End: 2024-06-17
Payer: MEDICARE

## 2024-06-17 ENCOUNTER — APPOINTMENT (OUTPATIENT)
Dept: GENERAL RADIOLOGY | Age: 84
End: 2024-06-17
Payer: MEDICARE

## 2024-06-17 ENCOUNTER — HOSPITAL ENCOUNTER (EMERGENCY)
Age: 84
Discharge: HOME | End: 2024-06-17
Attending: EMERGENCY MEDICINE
Payer: MEDICARE

## 2024-06-17 VITALS
OXYGEN SATURATION: 96 % | TEMPERATURE: 97.7 F | SYSTOLIC BLOOD PRESSURE: 162 MMHG | DIASTOLIC BLOOD PRESSURE: 82 MMHG | HEART RATE: 78 BPM | RESPIRATION RATE: 16 BRPM

## 2024-06-17 DIAGNOSIS — M79.644 FINGER PAIN, RIGHT: ICD-10-CM

## 2024-06-17 DIAGNOSIS — M79.641 HAND PAIN, RIGHT: ICD-10-CM
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
## 2024-06-17 DIAGNOSIS — M25.531 RIGHT WRIST PAIN: ICD-10-CM

## 2024-06-17 DIAGNOSIS — S80.00XA CONTUSION OF KNEE, UNSPECIFIED LATERALITY, INITIAL ENCOUNTER: ICD-10-CM

## 2024-06-17 DIAGNOSIS — S09.90XA CLOSED HEAD INJURY, INITIAL ENCOUNTER: ICD-10-CM

## 2024-06-17 DIAGNOSIS — T07.XXXA MULTIPLE CONTUSIONS: ICD-10-CM

## 2024-06-17 PROCEDURE — 73630 X-RAY EXAM OF FOOT: CPT

## 2024-06-17 PROCEDURE — 72125 CT NECK SPINE W/O DYE: CPT

## 2024-06-17 PROCEDURE — 99284 EMERGENCY DEPT VISIT MOD MDM: CPT

## 2024-06-17 PROCEDURE — 73562 X-RAY EXAM OF KNEE 3: CPT

## 2024-06-17 PROCEDURE — 73130 X-RAY EXAM OF HAND: CPT

## 2024-06-17 PROCEDURE — 73030 X-RAY EXAM OF SHOULDER: CPT

## 2024-06-17 PROCEDURE — 70450 CT HEAD/BRAIN W/O DYE: CPT

## 2024-06-17 PROCEDURE — 6370000000 HC RX 637 (ALT 250 FOR IP)

## 2024-06-17 PROCEDURE — 73110 X-RAY EXAM OF WRIST: CPT

## 2024-06-17 RX ORDER — HYDROCODONE BITARTRATE AND ACETAMINOPHEN 5; 325 MG/1; MG/1
1 TABLET ORAL ONCE
Status: COMPLETED | OUTPATIENT
Start: 2024-06-17 | End: 2024-06-17

## 2024-06-17 RX ORDER — HYDROCODONE BITARTRATE AND ACETAMINOPHEN 5; 325 MG/1; MG/1
1 TABLET ORAL EVERY 8 HOURS PRN
Qty: 3 TABLET | Refills: 0 | Status: SHIPPED | OUTPATIENT
Start: 2024-06-17 | End: 2024-06-20

## 2024-06-17 RX ADMIN — HYDROCODONE BITARTRATE AND ACETAMINOPHEN 1 TABLET: 5; 325 TABLET ORAL at 19:07

## 2024-06-17 ASSESSMENT — ENCOUNTER SYMPTOMS
EYES NEGATIVE: 1
GASTROINTESTINAL NEGATIVE: 1
RESPIRATORY NEGATIVE: 1
ALLERGIC/IMMUNOLOGIC NEGATIVE: 1

## 2024-06-17 NOTE — ED PROVIDER NOTES
ED physician        [facility] EMERGENCY DEPARTMENT  EMERGENCY DEPARTMENT ENCOUNTER        Pt Name: Griselda Cleaning  MRN: [id number]  Birthdate 1940  Date of evaluation: 6/17/2024  Provider: Charito Grimaldo APRN - CNP  PCP: Rosalie Caceres MD  Note Started: 7:02 PM EDT 6/17/24       I have seen and evaluated this patient with my supervising physician Maykel Blount MD.      CHIEF COMPLAINT       Chief Complaint   Patient presents with    Fall     Mechanical fall down 1 step, hit head into wall, no LOC, + thinners, left knee pain and right finger and wrist pain       HISTORY OF PRESENT ILLNESS: 1 or more Elements     History from : Patient    Limitations to history : None    Griselda Cleaning is a 83 y.o. female who presents to the ED for evaluation after a fall.  Patient states she was walking in the basement steps and missed the last step causing her to fall forward into the left.  Patient states she struck the left side of her head on the wall and then continue to fall forward landing on her hands and knees.  Patient denies any loss of consciousness.  Patient does take Eliquis.  Patient is complaining of right wrist pain, right hand pain, right shoulder pain, right foot pain, and left knee pain.  Patient denies headache, neck pain, dizziness, lightheadedness, chest pain, shortness of breath.  Patient denies any other injury.  Patient has no other complaints at this time.    Nursing Notes were all reviewed and agreed with or any disagreements were addressed in the HPI.    REVIEW OF SYSTEMS :      Review of Systems   Constitutional: Negative.    HENT: Negative.     Eyes: Negative.    Respiratory: Negative.     Cardiovascular: Negative.    Gastrointestinal: Negative.    Endocrine: Negative.    Genitourinary: Negative.    Musculoskeletal:         Right wrist pain, right hand pain, right shoulder pain, right foot pain, left knee pain   Skin: Negative.   Increase Bupropion to 300 mg daily.    Continue all other medications per your primary care provider.    Reduce alcohol use to less than 14 drinks total per week, no more than 4 drinks per occasion.    Schedule an appointment with me in 4 weeks or sooner as needed.      Call [facility] at [phone] to schedule or schedule at the .    Patient Education      Supply Resources:      Go to the Emergency Department as needed or call after hours crisis line at [phone].      To schedule individual or family therapy, call [facility] at [phone].     Follow up with primary care provider as planned or sooner for acute medical concerns.    Call the psychiatric nurse line with medication questions or concerns at [phone].    [employer] may be used to communicate with your provider, but this is not intended to be used for emergencies.    Community Resources:      National Suicide Prevention Lifeline: 502.981.6419 (TTY: 859.196.2951). Call anytime for help.  (www.suicidepreventionlifeline.org)    National [place] on Mental Illness (www.rosalio.org): 669.140.7652 or 593-756-9271.     Mental Health Association (www.mentalhealth.org): 605.466.9076 or 530-826-8529.    [place] Crisis Text Line: Text [place] to 971292    Suicide LifeLine Chat: suicidepreLiquidPracticeline.org/chat

## 2024-10-17 DIAGNOSIS — I26.02 ACUTE SADDLE PULMONARY EMBOLISM WITH ACUTE COR PULMONALE (HCC): Primary | ICD-10-CM

## 2024-10-21 ENCOUNTER — HOSPITAL ENCOUNTER (OUTPATIENT)
Age: 84
Discharge: HOME | End: 2024-10-21
Payer: MEDICARE

## 2024-10-21 DIAGNOSIS — I26.02 ACUTE SADDLE PULMONARY EMBOLISM WITH ACUTE COR PULMONALE (HCC): ICD-10-CM

## 2024-10-21 LAB
ALBUMIN SERPL-MCNC: 3.8 G/DL (ref 3.5–5.2)
ALP SERPL-CCNC: 79 U/L (ref 35–104)
ALT SERPL-CCNC: 16 U/L (ref 0–32)
ANION GAP SERPL CALCULATED.3IONS-SCNC: 8 MMOL/L (ref 7–16)
AST SERPL-CCNC: 15 U/L (ref 0–31)
BASOPHILS # BLD: 0.06 K/UL (ref 0–0.2)
BASOPHILS NFR BLD: 1 % (ref 0–2)
BILIRUB SERPL-MCNC: 0.5 MG/DL (ref 0–1.2)
BUN SERPL-MCNC: 17 MG/DL (ref 6–23)
CALCIUM SERPL-MCNC: 9.5 MG/DL (ref 8.6–10.2)
CHLORIDE SERPL-SCNC: 104 MMOL/L (ref 98–107)
CO2 SERPL-SCNC: 29 MMOL/L (ref 22–29)
CREAT SERPL-MCNC: 0.9 MG/DL (ref 0.5–1)
D-DIMER QUANTITATIVE: 210 NG/ML DDU (ref 0–230)
EOSINOPHIL # BLD: 0.19 K/UL (ref 0.05–0.5)
EOSINOPHILS RELATIVE PERCENT: 2 % (ref 0–6)
ERYTHROCYTE [DISTWIDTH] IN BLOOD BY AUTOMATED COUNT: 14.6 % (ref 11.5–15)
GFR, ESTIMATED: 62 ML/MIN/1.73M2
GLUCOSE SERPL-MCNC: 124 MG/DL (ref 74–99)
HCT VFR BLD AUTO: 40.4 % (ref 34–48)
HGB BLD-MCNC: 11.9 G/DL (ref 11.5–15.5)
IMM GRANULOCYTES # BLD AUTO: 0.03 K/UL (ref 0–0.58)
IMM GRANULOCYTES NFR BLD: 0 % (ref 0–5)
LYMPHOCYTES NFR BLD: 2.18 K/UL (ref 1.5–4)
LYMPHOCYTES RELATIVE PERCENT: 27 % (ref 20–42)
MCH RBC QN AUTO: 24.2 PG (ref 26–35)
MCHC RBC AUTO-ENTMCNC: 29.5 G/DL (ref 32–34.5)
MCV RBC AUTO: 82.1 FL (ref 80–99.9)
MONOCYTES NFR BLD: 0.57 K/UL (ref 0.1–0.95)
MONOCYTES NFR BLD: 7 % (ref 2–12)
NEUTROPHILS NFR BLD: 62 % (ref 43–80)
NEUTS SEG NFR BLD: 5.04 K/UL (ref 1.8–7.3)
PLATELET # BLD AUTO: 322 K/UL (ref 130–450)
PMV BLD AUTO: 9.6 FL (ref 7–12)
POTASSIUM SERPL-SCNC: 4.5 MMOL/L (ref 3.5–5)
PROT SERPL-MCNC: 6.5 G/DL (ref 6.4–8.3)
RBC # BLD AUTO: 4.92 M/UL (ref 3.5–5.5)
SODIUM SERPL-SCNC: 141 MMOL/L (ref 132–146)
WBC OTHER # BLD: 8.1 K/UL (ref 4.5–11.5)

## 2024-10-21 PROCEDURE — 36415 COLL VENOUS BLD VENIPUNCTURE: CPT

## 2024-10-21 PROCEDURE — 85379 FIBRIN DEGRADATION QUANT: CPT

## 2024-10-21 PROCEDURE — 80053 COMPREHEN METABOLIC PANEL: CPT

## 2024-10-21 PROCEDURE — 85025 COMPLETE CBC W/AUTO DIFF WBC: CPT

## 2024-10-30 ENCOUNTER — HOSPITAL ENCOUNTER (OUTPATIENT)
Dept: INFUSION THERAPY | Age: 84
Discharge: HOME | End: 2024-10-30

## 2024-10-30 ENCOUNTER — OFFICE VISIT (OUTPATIENT)
Dept: ONCOLOGY | Age: 84
End: 2024-10-30

## 2024-10-30 VITALS
HEIGHT: 62 IN | DIASTOLIC BLOOD PRESSURE: 77 MMHG | WEIGHT: 201 LBS | SYSTOLIC BLOOD PRESSURE: 154 MMHG | TEMPERATURE: 98.2 F | OXYGEN SATURATION: 94 % | BODY MASS INDEX: 36.99 KG/M2 | HEART RATE: 68 BPM

## 2024-10-30 DIAGNOSIS — I26.02 ACUTE SADDLE PULMONARY EMBOLISM WITH ACUTE COR PULMONALE (HCC): Primary | ICD-10-CM

## 2024-10-30 NOTE — PROGRESS NOTES
Department of [facility]  Attending Clinic Note    Reason for Visit: Follow-up on a patient with Hx of DVT/PE    PCP:  Rosalie Caceres MD    History of Present Illness:  83y.o. female with Hx of HTN, DMII, lymphedema, multiple prior DVT who was treated with short term anticoagulation (Coumadin), was not currently taking, who presented with bilateral massive PE, left subclavian artery thrombus, and SMA thrombus.   She was started on a heparin drip and then taken to OR by [facility] for SMA embolectomy, IVC filter, and left arm embolectomy.     Hypercoag work-up:  D-Dimer 4531  Homocysteine 4.3 (0-15)    KHRIS      Negative  DRVVT Negative  Cardiolipin antibodies:                                         IgG <10 (<14), IgM: 11 (<12) IgA: <10 (<11)  Beta-2 Glyco 1 IgA <=20 JAVIER 19      AT-III Activity 64% (%)- may be low in acute setting.  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 No bleeding noted.    Dr. Ramirez consulted to evaluate the liver lesion on CTA.   CTA triphasic liver consistent with hepatic cysts. 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 Colindres, today 11/1/2023 was the first office visit for the patient with me.  She is doing well overall, she denies any bleeding, she continues to be on Eliquis, no side effects, no recurrent clots.  She has close of breath on exertion she has joints pain.  She is

## 2025-04-24 ENCOUNTER — HOSPITAL ENCOUNTER (OUTPATIENT)
Age: 85
Discharge: HOME | End: 2025-04-24
Payer: MEDICARE

## 2025-04-24 DIAGNOSIS — I26.02 ACUTE SADDLE PULMONARY EMBOLISM WITH ACUTE COR PULMONALE (HCC): ICD-10-CM

## 2025-04-24 LAB
ALBUMIN SERPL-MCNC: 3.9 G/DL (ref 3.5–5.2)
ALP SERPL-CCNC: 75 U/L (ref 35–104)
ALT SERPL-CCNC: 17 U/L (ref 0–32)
ANION GAP SERPL CALCULATED.3IONS-SCNC: 12 MMOL/L (ref 7–16)
AST SERPL-CCNC: 14 U/L (ref 0–31)
BASOPHILS # BLD: 0.05 K/UL (ref 0–0.2)
BASOPHILS NFR BLD: 1 % (ref 0–2)
BILIRUB SERPL-MCNC: 0.4 MG/DL (ref 0–1.2)
BUN SERPL-MCNC: 20 MG/DL (ref 6–23)
CALCIUM SERPL-MCNC: 10 MG/DL (ref 8.6–10.2)
CHLORIDE SERPL-SCNC: 102 MMOL/L (ref 98–107)
CO2 SERPL-SCNC: 29 MMOL/L (ref 22–29)
CREAT SERPL-MCNC: 1 MG/DL (ref 0.5–1)
EOSINOPHIL # BLD: 0.14 K/UL (ref 0.05–0.5)
EOSINOPHILS RELATIVE PERCENT: 2 % (ref 0–6)
ERYTHROCYTE [DISTWIDTH] IN BLOOD BY AUTOMATED COUNT: 15.3 % (ref 11.5–15)
GFR, ESTIMATED: 56 ML/MIN/1.73M2
GLUCOSE SERPL-MCNC: 130 MG/DL (ref 74–99)
HCT VFR BLD AUTO: 40.5 % (ref 34–48)
HGB BLD-MCNC: 12.3 G/DL (ref 11.5–15.5)
IMM GRANULOCYTES # BLD AUTO: 0.05 K/UL (ref 0–0.58)
IMM GRANULOCYTES NFR BLD: 1 % (ref 0–5)
LYMPHOCYTES NFR BLD: 2.38 K/UL (ref 1.5–4)
LYMPHOCYTES RELATIVE PERCENT: 29 % (ref 20–42)
MCH RBC QN AUTO: 24.6 PG (ref 26–35)
MCHC RBC AUTO-ENTMCNC: 30.4 G/DL (ref 32–34.5)
MCV RBC AUTO: 81 FL (ref 80–99.9)
MONOCYTES NFR BLD: 0.62 K/UL (ref 0.1–0.95)
MONOCYTES NFR BLD: 8 % (ref 2–12)
NEUTROPHILS NFR BLD: 61 % (ref 43–80)
NEUTS SEG NFR BLD: 5.01 K/UL (ref 1.8–7.3)
PLATELET # BLD AUTO: 349 K/UL (ref 130–450)
PMV BLD AUTO: 9.6 FL (ref 7–12)
POTASSIUM SERPL-SCNC: 5.1 MMOL/L (ref 3.5–5)
PROT SERPL-MCNC: 6.6 G/DL (ref 6.4–8.3)
RBC # BLD AUTO: 5 M/UL (ref 3.5–5.5)
SODIUM SERPL-SCNC: 143 MMOL/L (ref 132–146)
WBC OTHER # BLD: 8.3 K/UL (ref 4.5–11.5)

## 2025-04-24 PROCEDURE — 80053 COMPREHEN METABOLIC PANEL: CPT

## 2025-04-24 PROCEDURE — 36415 COLL VENOUS BLD VENIPUNCTURE: CPT

## 2025-04-24 PROCEDURE — 85025 COMPLETE CBC W/AUTO DIFF WBC: CPT

## 2025-04-30 ENCOUNTER — OFFICE VISIT (OUTPATIENT)
Dept: ONCOLOGY | Age: 85
End: 2025-04-30
Payer: MEDICARE

## 2025-04-30 ENCOUNTER — HOSPITAL ENCOUNTER (OUTPATIENT)
Dept: INFUSION THERAPY | Age: 85
Discharge: HOME | End: 2025-04-30

## 2025-04-30 VITALS
SYSTOLIC BLOOD PRESSURE: 154 MMHG | WEIGHT: 205 LBS | TEMPERATURE: 98.2 F | BODY MASS INDEX: 37.73 KG/M2 | HEIGHT: 62 IN | DIASTOLIC BLOOD PRESSURE: 81 MMHG | HEART RATE: 69 BPM | OXYGEN SATURATION: 91 %

## 2025-04-30 DIAGNOSIS — I26.02 ACUTE SADDLE PULMONARY EMBOLISM WITH ACUTE COR PULMONALE (HCC): Primary | ICD-10-CM

## 2025-04-30 PROCEDURE — 1123F ACP DISCUSS/DSCN MKR DOCD: CPT | Performed by: INTERNAL MEDICINE

## 2025-04-30 PROCEDURE — 1126F AMNT PAIN NOTED NONE PRSNT: CPT | Performed by: INTERNAL MEDICINE

## 2025-04-30 PROCEDURE — 1160F RVW MEDS BY RX/DR IN RCRD: CPT | Performed by: INTERNAL MEDICINE

## 2025-04-30 PROCEDURE — 99214 OFFICE O/P EST MOD 30 MIN: CPT | Performed by: INTERNAL MEDICINE

## 2025-04-30 PROCEDURE — 99212 OFFICE O/P EST SF 10 MIN: CPT

## 2025-04-30 PROCEDURE — 1159F MED LIST DOCD IN RCRD: CPT | Performed by: INTERNAL MEDICINE

## 2025-04-30 NOTE — PROGRESS NOTES
Department of [facility]  Attending Clinic Note    Reason for Visit: Follow-up on a patient with Hx of DVT/PE    PCP:  Rosalie Caceres MD    History of Present Illness:  84y.o. female with Hx of HTN, DMII, lymphedema, multiple prior DVT who was treated with short term anticoagulation (Coumadin), was not currently taking, who presented with bilateral massive PE, left subclavian artery thrombus, and SMA thrombus.   She was started on a heparin drip and then taken to OR by [facility] for SMA embolectomy, IVC filter, and left arm embolectomy.     Hypercoag work-up:  D-Dimer 4531  Homocysteine 4.3 (0-15)    KHRIS      Negative  DRVVT Negative  Cardiolipin antibodies:                                         IgG <10 (<14), IgM: 11 (<12) IgA: <10 (<11)  Beta-2 Glyco 1 IgA <=20 JAVIER 19      AT-III Activity 64% (%)- may be low in acute setting.  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 No bleeding noted.    Dr. Ramirez consulted to evaluate the liver lesion on CTA.   CTA triphasic liver consistent with hepatic cysts. 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 Colindres, today 11/1/2023 was the first office visit for the patient with me.  She is doing well overall, she denies any bleeding, she continues to be on Eliquis, no side effects, no recurrent clots.  She has joints pain.  She is accompanied by her daughter.    Review of

## (undated) DEVICE — Device

## (undated) DEVICE — LOOP VES W13MM THK09MM MINI RED SIL FLD REPELLENT

## (undated) DEVICE — AGENT HEMSTAT W4XL8IN OXIDIZED REGENERATED CELOS ABSRB

## (undated) DEVICE — GAUZE,SPONGE,4"X4",16PLY,XRAY,STRL,LF: Brand: MEDLINE

## (undated) DEVICE — DILATOR ART

## (undated) DEVICE — E-Z CLEAN, NON-STICK, PTFE COATED, ELECTROSURGICAL BLADE ELECTRODE, 6.5 INCH (16.5 CM): Brand: MEGADYNE

## (undated) DEVICE — TUBING, SUCTION, 3/16" X 12', STRAIGHT: Brand: MEDLINE

## (undated) DEVICE — TOWEL,OR,DSP,ST,BLUE,STD,6/PK,12PK/CS: Brand: MEDLINE

## (undated) DEVICE — CLIP INT SM TI EZ LD LIG SYS WECK HORZ

## (undated) DEVICE — COUNTER NDL 30 COUNT DBL MAG

## (undated) DEVICE — CLIP INT M L GRN TI TRNSVRS GRV CHEVRON SHP W/ PRECIS TIP

## (undated) DEVICE — BLANKET WRM W35.4XL86.6IN FULL UNDERBODY + FORC AIR

## (undated) DEVICE — FOGARTY ARTERIAL EMBOLECTOMY CATHETER 4F 40CM: Brand: FOGARTY

## (undated) DEVICE — GLOVE SURG SZ 75 CRM LTX FREE POLYISOPRENE POLYMER BEAD ANTI

## (undated) DEVICE — TOWEL,OR,DSP,ST,GREEN,DLX,XR,4/PK,20PK/C: Brand: MEDLINE

## (undated) DEVICE — CLAMP INSERT: Brand: STEALTH® CLAMP INSERT

## (undated) DEVICE — DRAPE EQUIP BANDED BG 36X28 IN W/ROUNDED CORNER SNAPKOVER

## (undated) DEVICE — LABEL MED 4 IN SURG PANEL W/ PEN STRL

## (undated) DEVICE — SOLUTION IV 100ML 0.9% SOD CHL PLAS CONT USP VIAFLX 1 PER

## (undated) DEVICE — INTENDED FOR TISSUE SEPARATION, AND OTHER PROCEDURES THAT REQUIRE A SHARP SURGICAL BLADE TO PUNCTURE OR CUT.: Brand: BARD-PARKER ® STAINLESS STEEL BLADES

## (undated) DEVICE — SYRINGE MED 10ML POLYPR LUERSLIP TIP FLAT TOP W/O SFTY DISP

## (undated) DEVICE — GAUZE,SPONGE,AVANT,4"X4",4PLY,STRL,10/TR: Brand: MEDLINE

## (undated) DEVICE — CATHETER IV 20GA L1IN OD1.08MM ID0.8MM PNK VIALON

## (undated) DEVICE — 18GA (1.3MM OD: 1.0MM ID) X 7CM INTRODUCER18GA X 7CM NEEDLENEEDLE: Brand: INTRODUCER NEEDLEINTRODUCER NEEDLE

## (undated) DEVICE — GLOVE SURG SZ 75 L12IN FNGR THK79MIL GRN LTX FREE

## (undated) DEVICE — 1 ML TUBERCULIN SYRINGE LUER-LOCK TIP: Brand: MONOJECT

## (undated) DEVICE — SNAP KOVER: Brand: UNBRANDED

## (undated) DEVICE — MAJOR VASCULAR: Brand: MEDLINE INDUSTRIES, INC.

## (undated) DEVICE — SPONGE LAP W18XL18IN WHT COT 4 PLY FLD STRUNG RADPQ DISP ST

## (undated) DEVICE — FEEDING TUBE • RADIOPAQUE: Brand: ARGYLE

## (undated) DEVICE — CLOTH SURG PREP PREOPERATIVE CHLORHEXIDINE GLUC 2% READYPREP

## (undated) DEVICE — SOLUTION IRRIG 500ML 0.9% SOD CHL USP POUR PLAS BTL

## (undated) DEVICE — SET SURG INSTR ART III

## (undated) DEVICE — BENTSON WIRE GUIDE 20CM DISTAL FLEXIBILITY WITH SOFTENED TIP: Brand: BENTSON

## (undated) DEVICE — COVER,TABLE,60X90,STERILE: Brand: MEDLINE

## (undated) DEVICE — SYRINGE MED 20ML STD CLR PLAS LUERSLIP TIP N CTRL DISP

## (undated) DEVICE — GOWN,SIRUS,FABRNF,L,20/CS: Brand: MEDLINE

## (undated) DEVICE — SET INSTR ART 1

## (undated) DEVICE — YANKAUER,OPEN TIP,W/O VENT,STERILE: Brand: MEDLINE INDUSTRIES, INC.

## (undated) DEVICE — Z DISCONTINUED PER MEDLINE USE 2425483 TAPE UMB L30IN DIA1/8IN WHT COT NONABSORBABLE W/O NDL FOR

## (undated) DEVICE — RETRACTOR ART

## (undated) DEVICE — PENCIL,CAUTERY,ROCKER,PTFE,15'CORD: Brand: MEDLINE INDUSTRIES, INC.

## (undated) DEVICE — GOWN,SIRUS,FABRNF,XL,20/CS: Brand: MEDLINE

## (undated) DEVICE — BLADE CLIPPER GEN PURP NS

## (undated) DEVICE — TRAY VCF/TESIO TRAY  REUSABLE

## (undated) DEVICE — CATHETER ETER URETH 24FR L16IN RED RUB INTMIT ROB MOD BARDX

## (undated) DEVICE — APPLICATOR MEDICATED 26 CC SOLUTION HI LT ORNG CHLORAPREP

## (undated) DEVICE — SET SURG BASIN MAYO REUSABLE

## (undated) DEVICE — DRESSING TRNSPAR W4XL55IN ACRYL SUP FLM W ADH WTRPRF OPSITE

## (undated) DEVICE — DRAPE SHEET: Brand: UNBRANDED

## (undated) DEVICE — SET SURG INSTR MINI VASC

## (undated) DEVICE — PACK,LAPAROTOMY,NO GOWNS: Brand: MEDLINE

## (undated) DEVICE — Z DISCONTINUED USE 2744636  DRESSING AQUACEL 14 IN ALG W3.5XL14IN POLYUR FLM CVR W/ HYDRCOLL

## (undated) DEVICE — 3M™ STERI-DRAPE™ CARDIOVASCULAR SHEET WITH IOBAN™ 2 INCISE FILM 6677: Brand: STERI-DRAPE™ IOBAN™ 2

## (undated) DEVICE — CATHETER IV 16GA 205ML/MIN L1.77IN OD1.74MM ID1.359MM GRY

## (undated) DEVICE — GLOVE SURG SZ 65 THK91MIL LTX FREE SYN POLYISOPRENE

## (undated) DEVICE — GOWN,AURORA,NONREINF,RAGLAN,L,STERILE: Brand: MEDLINE

## (undated) DEVICE — DRAPE,REIN 53X77,STERILE: Brand: MEDLINE

## (undated) DEVICE — YANKAUER,POOLE TIP,STERILE,50/CS: Brand: MEDLINE

## (undated) DEVICE — SYRINGE IRRIG 60ML SFT PLIABLE BLB EZ TO GRP 1 HND USE W/

## (undated) DEVICE — NEEDLE HYPO 25GA L1.5IN BLU POLYPR HUB S STL REG BVL STR

## (undated) DEVICE — SYRINGE MED 10ML LUERLOCK TIP W/O SFTY DISP

## (undated) DEVICE — DRAPE 24X23IN RADIOLOGY CASS ADHES STRIP